# Patient Record
Sex: FEMALE | Race: BLACK OR AFRICAN AMERICAN | NOT HISPANIC OR LATINO | URBAN - METROPOLITAN AREA
[De-identification: names, ages, dates, MRNs, and addresses within clinical notes are randomized per-mention and may not be internally consistent; named-entity substitution may affect disease eponyms.]

---

## 2021-11-17 ENCOUNTER — INPATIENT (INPATIENT)
Facility: HOSPITAL | Age: 51
LOS: 6 days | Discharge: HOME HEALTH | End: 2021-11-24
Attending: INTERNAL MEDICINE | Admitting: INTERNAL MEDICINE
Payer: COMMERCIAL

## 2021-11-17 VITALS
RESPIRATION RATE: 16 BRPM | TEMPERATURE: 98 F | DIASTOLIC BLOOD PRESSURE: 77 MMHG | SYSTOLIC BLOOD PRESSURE: 121 MMHG | HEART RATE: 98 BPM | OXYGEN SATURATION: 100 %

## 2021-11-17 DIAGNOSIS — T14.8XXA OTHER INJURY OF UNSPECIFIED BODY REGION, INITIAL ENCOUNTER: ICD-10-CM

## 2021-11-17 LAB
ALBUMIN SERPL ELPH-MCNC: 4.3 G/DL — SIGNIFICANT CHANGE UP (ref 3.3–5)
ALP SERPL-CCNC: 120 U/L — SIGNIFICANT CHANGE UP (ref 40–120)
ALT FLD-CCNC: 18 U/L — SIGNIFICANT CHANGE UP (ref 4–33)
ANION GAP SERPL CALC-SCNC: 10 MMOL/L — SIGNIFICANT CHANGE UP (ref 7–14)
AST SERPL-CCNC: 16 U/L — SIGNIFICANT CHANGE UP (ref 4–32)
B PERT DNA SPEC QL NAA+PROBE: SIGNIFICANT CHANGE UP
B PERT+PARAPERT DNA PNL SPEC NAA+PROBE: SIGNIFICANT CHANGE UP
BASE EXCESS BLDV CALC-SCNC: 6.5 MMOL/L — HIGH (ref -2–3)
BASOPHILS # BLD AUTO: 0.02 K/UL — SIGNIFICANT CHANGE UP (ref 0–0.2)
BASOPHILS NFR BLD AUTO: 0.2 % — SIGNIFICANT CHANGE UP (ref 0–2)
BILIRUB SERPL-MCNC: 0.7 MG/DL — SIGNIFICANT CHANGE UP (ref 0.2–1.2)
BLOOD GAS VENOUS COMPREHENSIVE RESULT: SIGNIFICANT CHANGE UP
BORDETELLA PARAPERTUSSIS (RAPRVP): SIGNIFICANT CHANGE UP
BUN SERPL-MCNC: 12 MG/DL — SIGNIFICANT CHANGE UP (ref 7–23)
C PNEUM DNA SPEC QL NAA+PROBE: SIGNIFICANT CHANGE UP
CALCIUM SERPL-MCNC: 9.7 MG/DL — SIGNIFICANT CHANGE UP (ref 8.4–10.5)
CHLORIDE BLDV-SCNC: 97 MMOL/L — SIGNIFICANT CHANGE UP (ref 96–108)
CHLORIDE SERPL-SCNC: 97 MMOL/L — LOW (ref 98–107)
CO2 BLDV-SCNC: 34.4 MMOL/L — HIGH (ref 22–26)
CO2 SERPL-SCNC: 30 MMOL/L — SIGNIFICANT CHANGE UP (ref 22–31)
CREAT SERPL-MCNC: 0.64 MG/DL — SIGNIFICANT CHANGE UP (ref 0.5–1.3)
EOSINOPHIL # BLD AUTO: 0.08 K/UL — SIGNIFICANT CHANGE UP (ref 0–0.5)
EOSINOPHIL NFR BLD AUTO: 0.9 % — SIGNIFICANT CHANGE UP (ref 0–6)
FLUAV SUBTYP SPEC NAA+PROBE: SIGNIFICANT CHANGE UP
FLUBV RNA SPEC QL NAA+PROBE: SIGNIFICANT CHANGE UP
GAS PNL BLDV: 131 MMOL/L — LOW (ref 136–145)
GAS PNL BLDV: SIGNIFICANT CHANGE UP
GLUCOSE BLDV-MCNC: 312 MG/DL — HIGH (ref 70–99)
GLUCOSE SERPL-MCNC: 240 MG/DL — HIGH (ref 70–99)
HADV DNA SPEC QL NAA+PROBE: SIGNIFICANT CHANGE UP
HCO3 BLDV-SCNC: 33 MMOL/L — HIGH (ref 22–29)
HCOV 229E RNA SPEC QL NAA+PROBE: SIGNIFICANT CHANGE UP
HCOV HKU1 RNA SPEC QL NAA+PROBE: SIGNIFICANT CHANGE UP
HCOV NL63 RNA SPEC QL NAA+PROBE: SIGNIFICANT CHANGE UP
HCOV OC43 RNA SPEC QL NAA+PROBE: SIGNIFICANT CHANGE UP
HCT VFR BLD CALC: 40.8 % — SIGNIFICANT CHANGE UP (ref 34.5–45)
HCT VFR BLDA CALC: 40 % — SIGNIFICANT CHANGE UP (ref 34.5–46.5)
HGB BLD CALC-MCNC: 13.2 G/DL — SIGNIFICANT CHANGE UP (ref 11.5–15.5)
HGB BLD-MCNC: 13.1 G/DL — SIGNIFICANT CHANGE UP (ref 11.5–15.5)
HMPV RNA SPEC QL NAA+PROBE: SIGNIFICANT CHANGE UP
HPIV1 RNA SPEC QL NAA+PROBE: SIGNIFICANT CHANGE UP
HPIV2 RNA SPEC QL NAA+PROBE: SIGNIFICANT CHANGE UP
HPIV3 RNA SPEC QL NAA+PROBE: SIGNIFICANT CHANGE UP
HPIV4 RNA SPEC QL NAA+PROBE: SIGNIFICANT CHANGE UP
IANC: 6.84 K/UL — SIGNIFICANT CHANGE UP (ref 1.5–8.5)
IMM GRANULOCYTES NFR BLD AUTO: 0.4 % — SIGNIFICANT CHANGE UP (ref 0–1.5)
LACTATE BLDV-MCNC: 1.6 MMOL/L — SIGNIFICANT CHANGE UP (ref 0.5–2)
LYMPHOCYTES # BLD AUTO: 1.44 K/UL — SIGNIFICANT CHANGE UP (ref 1–3.3)
LYMPHOCYTES # BLD AUTO: 16.1 % — SIGNIFICANT CHANGE UP (ref 13–44)
M PNEUMO DNA SPEC QL NAA+PROBE: SIGNIFICANT CHANGE UP
MCHC RBC-ENTMCNC: 28.2 PG — SIGNIFICANT CHANGE UP (ref 27–34)
MCHC RBC-ENTMCNC: 32.1 GM/DL — SIGNIFICANT CHANGE UP (ref 32–36)
MCV RBC AUTO: 87.9 FL — SIGNIFICANT CHANGE UP (ref 80–100)
MONOCYTES # BLD AUTO: 0.52 K/UL — SIGNIFICANT CHANGE UP (ref 0–0.9)
MONOCYTES NFR BLD AUTO: 5.8 % — SIGNIFICANT CHANGE UP (ref 2–14)
NEUTROPHILS # BLD AUTO: 6.84 K/UL — SIGNIFICANT CHANGE UP (ref 1.8–7.4)
NEUTROPHILS NFR BLD AUTO: 76.6 % — SIGNIFICANT CHANGE UP (ref 43–77)
NRBC # BLD: 0 /100 WBCS — SIGNIFICANT CHANGE UP
NRBC # FLD: 0 K/UL — SIGNIFICANT CHANGE UP
PCO2 BLDV: 53 MMHG — HIGH (ref 39–42)
PH BLDV: 7.4 — SIGNIFICANT CHANGE UP (ref 7.32–7.43)
PLATELET # BLD AUTO: 314 K/UL — SIGNIFICANT CHANGE UP (ref 150–400)
PO2 BLDV: 23 MMHG — SIGNIFICANT CHANGE UP
POTASSIUM BLDV-SCNC: 3.8 MMOL/L — SIGNIFICANT CHANGE UP (ref 3.5–5.1)
POTASSIUM SERPL-MCNC: 3.9 MMOL/L — SIGNIFICANT CHANGE UP (ref 3.5–5.3)
POTASSIUM SERPL-SCNC: 3.9 MMOL/L — SIGNIFICANT CHANGE UP (ref 3.5–5.3)
PROT SERPL-MCNC: 8.3 G/DL — SIGNIFICANT CHANGE UP (ref 6–8.3)
RAPID RVP RESULT: SIGNIFICANT CHANGE UP
RBC # BLD: 4.64 M/UL — SIGNIFICANT CHANGE UP (ref 3.8–5.2)
RBC # FLD: 12.7 % — SIGNIFICANT CHANGE UP (ref 10.3–14.5)
RSV RNA SPEC QL NAA+PROBE: SIGNIFICANT CHANGE UP
RV+EV RNA SPEC QL NAA+PROBE: SIGNIFICANT CHANGE UP
SAO2 % BLDV: 37.9 % — SIGNIFICANT CHANGE UP
SARS-COV-2 RNA SPEC QL NAA+PROBE: SIGNIFICANT CHANGE UP
SODIUM SERPL-SCNC: 137 MMOL/L — SIGNIFICANT CHANGE UP (ref 135–145)
WBC # BLD: 8.94 K/UL — SIGNIFICANT CHANGE UP (ref 3.8–10.5)
WBC # FLD AUTO: 8.94 K/UL — SIGNIFICANT CHANGE UP (ref 3.8–10.5)

## 2021-11-17 PROCEDURE — 73201 CT UPPER EXTREMITY W/DYE: CPT | Mod: 26,LT,MA

## 2021-11-17 PROCEDURE — 99285 EMERGENCY DEPT VISIT HI MDM: CPT

## 2021-11-17 RX ORDER — CIPROFLOXACIN LACTATE 400MG/40ML
400 VIAL (ML) INTRAVENOUS ONCE
Refills: 0 | Status: COMPLETED | OUTPATIENT
Start: 2021-11-17 | End: 2021-11-17

## 2021-11-17 RX ORDER — ACETAMINOPHEN 500 MG
975 TABLET ORAL ONCE
Refills: 0 | Status: COMPLETED | OUTPATIENT
Start: 2021-11-17 | End: 2021-11-17

## 2021-11-17 RX ADMIN — Medication 100 MILLIGRAM(S): at 14:17

## 2021-11-17 RX ADMIN — Medication 200 MILLIGRAM(S): at 15:39

## 2021-11-17 RX ADMIN — Medication 975 MILLIGRAM(S): at 14:17

## 2021-11-17 NOTE — ED PROVIDER NOTE - OBJECTIVE STATEMENT
Val: H/o DM. Attacked 5 days ago by neighbor’s pit bull; bit and scratched by dog. Went to [facility]. Got Doxy. Got tetanus shot. Animal Control aware. No F. States she's had chills. C/o pus from wound. Increasing swelling/redness from hand to upper arm.

## 2021-11-17 NOTE — ED PROCEDURE NOTE - ATTENDING CONTRIBUTION TO CARE
Val: I was present during the critical part of the procedure.
I performed a face-to-face evaluation of the patient and performed a history and physical examination. I agree with the history and physical examination.    Val: I was present during the critical part of the procedure.

## 2021-11-17 NOTE — CONSULT NOTE ADULT - ASSESSMENT
Infected dog bite wound  drainage expressed through wound and irrigated with saline  wound packed with guaze  cx obtained  will need to press and express around wound area 2-3x/day  iv abx

## 2021-11-17 NOTE — ED PROVIDER NOTE - SHIFT CHANGE DETAILS
Dr. Lazaro: Pt signed out to me by Dr. Neal. Pt pending admission  for cellulitis s/p abx failure from dog bite

## 2021-11-17 NOTE — ED PROVIDER NOTE - PROGRESS NOTE DETAILS
Dr. Lazaro: Pt unable to tolerate ordered US to evaluate extent of cellulitis. Discussed with US team. Will dc at this time. Dr. Lazaro: Pt unable to tolerate ordered US to evaluate extent of cellulitis. Discussed with US team. Will dc test at this time. Dr. Lazaro: Case discussed with radiology regarding findings. Will call general surgery for possible debridement. Dr. Lazaro: Case discussed with general surgery, given injury below the elbow requested plastics involvement. Paged plastics. Dr. Lazaro: Case discussed with Dr. Chinchilla from plastics. No indication for debridement, recommended external washouts with milking out purulent material. Dr. Lazaro: Case rediscussed with Surgical team. No indication for surgery involvement as the fluid tracking is below the elbow. Case escalated to administration Dr. Acevedo. Dr. Lazaro: Case discussed with on call plastic surgery resident. Patient cannot physically be seen by them at this time as Dr. Chinchilla is a private attending. Dr. Lazaro: Case discussed with Dr. Patel from plastics. No indication for debridement, recommended external washouts with milking out purulent material. Dr. Lazaro: Case discussed with on call plastic surgery resident. Patient cannot physically be seen by them at this time as Dr. Patel is a private attending.

## 2021-11-17 NOTE — ED PROVIDER NOTE - CLINICAL SUMMARY MEDICAL DECISION MAKING FREE TEXT BOX
Val: Infected dog bites with pus. I & D. Admit for IV ABx (clinda and cipro). Elevate arm. CBC, xray forearm.

## 2021-11-17 NOTE — CONSULT NOTE ADULT - SUBJECTIVE AND OBJECTIVE BOX
Plastic Surgery Consult Note  ([phone])    HPI: 50yo female H/o DM. Attacked 5 days ago by neighbor’s pit bull; bit and scratched by dog. Went to [facility]. Got Doxy. Got tetanus shot. Animal Control aware. No F. States she's had chills. C/o pus from wound starting yesterday.  Plastic surgery asked to eval.      PAST MEDICAL & SURGICAL HISTORY:      Allergies    penicillins (Hives; Urticaria)    Intolerances        Home Medications:      MEDICATIONS  (STANDING):      SOCIAL HISTORY:    FAMILY HISTORY:      ___________________________________________  REVIEW OF SYSTEMS:    Constitutional: No fevers, chills, no recent weight loss  ENMT: No changes in hearing, no changes in vision, no sore throat, no cough  Respiratory: No shortness of breath  Cardiovascular: No chest pain, palpitations  Gastrointestinal: No abdominal pain, no diarrhea/constipation  Genitourinary: No dysuria, frequency, or urgency    Extremities: No joint swelling, no limited range of movement  Neurological: No paresthesia  Skin: No rashes    ___________________________________________  OBJECTIVE:  Vital Signs Last 24 Hrs  T(C): 36.9 (17 Nov 2021 18:37), Max: 36.9 (17 Nov 2021 12:15)  T(F): 98.4 (17 Nov 2021 18:37), Max: 98.4 (17 Nov 2021 12:15)  HR: 87 (17 Nov 2021 18:37) (87 - 98)  BP: 131/87 (17 Nov 2021 18:37) (117/70 - 131/87)  BP(mean): --  RR: 18 (17 Nov 2021 18:37) (16 - 18)  SpO2: 100% (17 Nov 2021 18:37) (100% - 100%)CAPILLARY BLOOD GLUCOSE        I&O's Detail      PHYSICAL EXAM:    General: Alert, NAD  Respiratory: non-labored breathing  Extremities:  erythema and fluctuance at left forearm mobile wad area with open wound draining seropurulent drainage  MSK:   ____________________________________________  LABS:  CBC Full  -  ( 17 Nov 2021 14:43 )  WBC Count : 8.94 K/uL  RBC Count : 4.64 M/uL  Hemoglobin : 13.1 g/dL  Hematocrit : 40.8 %  Platelet Count - Automated : 314 K/uL  Mean Cell Volume : 87.9 fL  Mean Cell Hemoglobin : 28.2 pg  Mean Cell Hemoglobin Concentration : 32.1 gm/dL  Auto Neutrophil # : 6.84 K/uL  Auto Lymphocyte # : 1.44 K/uL  Auto Monocyte # : 0.52 K/uL  Auto Eosinophil # : 0.08 K/uL  Auto Basophil # : 0.02 K/uL  Auto Neutrophil % : 76.6 %  Auto Lymphocyte % : 16.1 %  Auto Monocyte % : 5.8 %  Auto Eosinophil % : 0.9 %  Auto Basophil % : 0.2 %    11-17    137  |  97<L>  |  12  ----------------------------<  240<H>  3.9   |  30  |  0.64    Ca    9.7      17 Nov 2021 14:43    TPro  8.3  /  Alb  4.3  /  TBili  0.7  /  DBili  x   /  AST  16  /  ALT  18  /  AlkPhos  120  11-17    LIVER FUNCTIONS - ( 17 Nov 2021 14:43 )  Alb: 4.3 g/dL / Pro: 8.3 g/dL / ALK PHOS: 120 U/L / ALT: 18 U/L / AST: 16 U/L / GGT: x                   ____________________________________________  MICRO:    ____________________________________________  RADIOLOGY:

## 2021-11-17 NOTE — ED PROVIDER NOTE - ATTENDING CONTRIBUTION TO CARE
I performed a face-to-face evaluation of the patient and performed a history and physical examination. I agree with the history and physical examination.    Val: Infected dog bites with pus. I & D. Admit for IV ABx (clinda and cipro). Elevate arm. CBC, xray forearm.

## 2021-11-17 NOTE — ED ADULT TRIAGE NOTE - CHIEF COMPLAINT QUOTE
Pt was bit by a dog on her LT arm on Friday and was seen at a hospital in [place]. States she is worried the bite may now be infected. PMH: DM2, HTN

## 2021-11-17 NOTE — ED ADULT NURSE NOTE - OBJECTIVE STATEMENT
pt received at intake AAO x 3. Pt reports she was bit by a dog on her Left arm on Friday and was seen at a hospital in [place]. pt States she is worried the bite may now be infected.

## 2021-11-17 NOTE — ED PROCEDURE NOTE - ULTRASOUND FINDINGS
Long, thin area of hypogenicity c/w fluid, ~ 1-1.5 cm deep. Small localized fluid collection at skin surface contiguous with this long thin hypogenic stripe.

## 2021-11-17 NOTE — ED ADULT NURSE NOTE - CHIEF COMPLAINT QUOTE
Pt was bit by a dog on her LT arm on Friday and was seen at a hospital in [place]. States she is worried the bite may now be infected. PMH: DM2, HTN

## 2021-11-17 NOTE — ED PROVIDER NOTE - PHYSICAL EXAMINATION
Well appearing, well nourished, awake, alert, oriented to person, place, time/situation and in no apparent distress.    Airway patent    Eyes without scleral injection. No jaundice.    Strong pulse.    Respirations unlabored.    Abdomen soft, non-tender, no guarding.    Spine appears normal, range of motion is not limited, no muscle or joint tenderness.    Alert and oriented, no gross motor or sensory deficits.    Skin: L forearm: multiple areas that are stitched and residual scabs; dorsal aspect of forearm has a wound with pus. Erythematous, swollen. NVI.    No SI/HI.

## 2021-11-17 NOTE — ED PROCEDURE NOTE - US DIAGNOSIS
skin abscess, ? extending a long distance up and down forearm vs. the hypogenicity is reactive to overlying cellulitis

## 2021-11-18 DIAGNOSIS — Z29.9 ENCOUNTER FOR PROPHYLACTIC MEASURES, UNSPECIFIED: ICD-10-CM
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
## 2021-11-18 DIAGNOSIS — M65.9 SYNOVITIS AND TENOSYNOVITIS, UNSPECIFIED: ICD-10-CM

## 2021-11-18 DIAGNOSIS — S51.859A: ICD-10-CM

## 2021-11-18 DIAGNOSIS — I10 ESSENTIAL (PRIMARY) HYPERTENSION: ICD-10-CM

## 2021-11-18 DIAGNOSIS — E11.65 TYPE 2 DIABETES MELLITUS WITH HYPERGLYCEMIA: ICD-10-CM

## 2021-11-18 DIAGNOSIS — L03.114 CELLULITIS OF LEFT UPPER LIMB: ICD-10-CM

## 2021-11-18 LAB
ALBUMIN SERPL ELPH-MCNC: 3.6 G/DL — SIGNIFICANT CHANGE UP (ref 3.3–5)
ALP SERPL-CCNC: 103 U/L — SIGNIFICANT CHANGE UP (ref 40–120)
ALT FLD-CCNC: 13 U/L — SIGNIFICANT CHANGE UP (ref 4–33)
ANION GAP SERPL CALC-SCNC: 13 MMOL/L — SIGNIFICANT CHANGE UP (ref 7–14)
AST SERPL-CCNC: 21 U/L — SIGNIFICANT CHANGE UP (ref 4–32)
BASOPHILS # BLD AUTO: 0.02 K/UL — SIGNIFICANT CHANGE UP (ref 0–0.2)
BASOPHILS NFR BLD AUTO: 0.2 % — SIGNIFICANT CHANGE UP (ref 0–2)
BILIRUB SERPL-MCNC: 0.8 MG/DL — SIGNIFICANT CHANGE UP (ref 0.2–1.2)
BUN SERPL-MCNC: 12 MG/DL — SIGNIFICANT CHANGE UP (ref 7–23)
CALCIUM SERPL-MCNC: 9.3 MG/DL — SIGNIFICANT CHANGE UP (ref 8.4–10.5)
CHLORIDE SERPL-SCNC: 96 MMOL/L — LOW (ref 98–107)
CHOLEST SERPL-MCNC: 148 MG/DL — SIGNIFICANT CHANGE UP
CK SERPL-CCNC: 320 U/L — HIGH (ref 25–170)
CO2 SERPL-SCNC: 25 MMOL/L — SIGNIFICANT CHANGE UP (ref 22–31)
CREAT SERPL-MCNC: 0.62 MG/DL — SIGNIFICANT CHANGE UP (ref 0.5–1.3)
EOSINOPHIL # BLD AUTO: 0.1 K/UL — SIGNIFICANT CHANGE UP (ref 0–0.5)
EOSINOPHIL NFR BLD AUTO: 1.1 % — SIGNIFICANT CHANGE UP (ref 0–6)
GLUCOSE BLDC GLUCOMTR-MCNC: 180 MG/DL — HIGH (ref 70–99)
GLUCOSE BLDC GLUCOMTR-MCNC: 206 MG/DL — HIGH (ref 70–99)
GLUCOSE BLDC GLUCOMTR-MCNC: 210 MG/DL — HIGH (ref 70–99)
GLUCOSE BLDC GLUCOMTR-MCNC: 243 MG/DL — HIGH (ref 70–99)
GLUCOSE BLDC GLUCOMTR-MCNC: 256 MG/DL — HIGH (ref 70–99)
GLUCOSE SERPL-MCNC: 242 MG/DL — HIGH (ref 70–99)
HCT VFR BLD CALC: 34.3 % — LOW (ref 34.5–45)
HDLC SERPL-MCNC: 33 MG/DL — LOW
HGB BLD-MCNC: 11.1 G/DL — LOW (ref 11.5–15.5)
IANC: 6.57 K/UL — SIGNIFICANT CHANGE UP (ref 1.5–8.5)
IMM GRANULOCYTES NFR BLD AUTO: 0.5 % — SIGNIFICANT CHANGE UP (ref 0–1.5)
LIPID PNL WITH DIRECT LDL SERPL: 98 MG/DL — SIGNIFICANT CHANGE UP
LYMPHOCYTES # BLD AUTO: 1.49 K/UL — SIGNIFICANT CHANGE UP (ref 1–3.3)
LYMPHOCYTES # BLD AUTO: 16.8 % — SIGNIFICANT CHANGE UP (ref 13–44)
MAGNESIUM SERPL-MCNC: 1.9 MG/DL — SIGNIFICANT CHANGE UP (ref 1.6–2.6)
MCHC RBC-ENTMCNC: 29.1 PG — SIGNIFICANT CHANGE UP (ref 27–34)
MCHC RBC-ENTMCNC: 32.4 GM/DL — SIGNIFICANT CHANGE UP (ref 32–36)
MCV RBC AUTO: 90 FL — SIGNIFICANT CHANGE UP (ref 80–100)
MONOCYTES # BLD AUTO: 0.63 K/UL — SIGNIFICANT CHANGE UP (ref 0–0.9)
MONOCYTES NFR BLD AUTO: 7.1 % — SIGNIFICANT CHANGE UP (ref 2–14)
NEUTROPHILS # BLD AUTO: 6.57 K/UL — SIGNIFICANT CHANGE UP (ref 1.8–7.4)
NEUTROPHILS NFR BLD AUTO: 74.3 % — SIGNIFICANT CHANGE UP (ref 43–77)
NON HDL CHOLESTEROL: 115 MG/DL — SIGNIFICANT CHANGE UP
NRBC # BLD: 0 /100 WBCS — SIGNIFICANT CHANGE UP
NRBC # FLD: 0 K/UL — SIGNIFICANT CHANGE UP
PHOSPHATE SERPL-MCNC: 4 MG/DL — SIGNIFICANT CHANGE UP (ref 2.5–4.5)
PLATELET # BLD AUTO: 281 K/UL — SIGNIFICANT CHANGE UP (ref 150–400)
POTASSIUM SERPL-MCNC: 4.2 MMOL/L — SIGNIFICANT CHANGE UP (ref 3.5–5.3)
POTASSIUM SERPL-SCNC: 4.2 MMOL/L — SIGNIFICANT CHANGE UP (ref 3.5–5.3)
PROT SERPL-MCNC: 7.3 G/DL — SIGNIFICANT CHANGE UP (ref 6–8.3)
RBC # BLD: 3.81 M/UL — SIGNIFICANT CHANGE UP (ref 3.8–5.2)
RBC # FLD: 12.6 % — SIGNIFICANT CHANGE UP (ref 10.3–14.5)
SODIUM SERPL-SCNC: 134 MMOL/L — LOW (ref 135–145)
TRIGL SERPL-MCNC: 85 MG/DL — SIGNIFICANT CHANGE UP
TSH SERPL-MCNC: 1.27 UIU/ML — SIGNIFICANT CHANGE UP (ref 0.27–4.2)
WBC # BLD: 8.85 K/UL — SIGNIFICANT CHANGE UP (ref 3.8–10.5)
WBC # FLD AUTO: 8.85 K/UL — SIGNIFICANT CHANGE UP (ref 3.8–10.5)

## 2021-11-18 PROCEDURE — 99223 1ST HOSP IP/OBS HIGH 75: CPT

## 2021-11-18 RX ORDER — DEXTROSE 50 % IN WATER 50 %
12.5 SYRINGE (ML) INTRAVENOUS ONCE
Refills: 0 | Status: DISCONTINUED | OUTPATIENT
Start: 2021-11-18 | End: 2021-11-24

## 2021-11-18 RX ORDER — DEXTROSE 50 % IN WATER 50 %
25 SYRINGE (ML) INTRAVENOUS ONCE
Refills: 0 | Status: DISCONTINUED | OUTPATIENT
Start: 2021-11-18 | End: 2021-11-24

## 2021-11-18 RX ORDER — ACETAMINOPHEN 500 MG
650 TABLET ORAL EVERY 6 HOURS
Refills: 0 | Status: DISCONTINUED | OUTPATIENT
Start: 2021-11-18 | End: 2021-11-24

## 2021-11-18 RX ORDER — LANOLIN ALCOHOL/MO/W.PET/CERES
3 CREAM (GRAM) TOPICAL AT BEDTIME
Refills: 0 | Status: DISCONTINUED | OUTPATIENT
Start: 2021-11-18 | End: 2021-11-24

## 2021-11-18 RX ORDER — INSULIN LISPRO 100/ML
VIAL (ML) SUBCUTANEOUS AT BEDTIME
Refills: 0 | Status: DISCONTINUED | OUTPATIENT
Start: 2021-11-18 | End: 2021-11-24

## 2021-11-18 RX ORDER — SODIUM CHLORIDE 9 MG/ML
1000 INJECTION, SOLUTION INTRAVENOUS
Refills: 0 | Status: DISCONTINUED | OUTPATIENT
Start: 2021-11-18 | End: 2021-11-24

## 2021-11-18 RX ORDER — INSULIN LISPRO 100/ML
VIAL (ML) SUBCUTANEOUS
Refills: 0 | Status: DISCONTINUED | OUTPATIENT
Start: 2021-11-18 | End: 2021-11-20

## 2021-11-18 RX ORDER — HYDROMORPHONE HYDROCHLORIDE 2 MG/ML
0.5 INJECTION INTRAMUSCULAR; INTRAVENOUS; SUBCUTANEOUS
Refills: 0 | Status: DISCONTINUED | OUTPATIENT
Start: 2021-11-18 | End: 2021-11-24

## 2021-11-18 RX ORDER — ENOXAPARIN SODIUM 100 MG/ML
40 INJECTION SUBCUTANEOUS AT BEDTIME
Refills: 0 | Status: DISCONTINUED | OUTPATIENT
Start: 2021-11-18 | End: 2021-11-24

## 2021-11-18 RX ORDER — INSULIN LISPRO 100/ML
VIAL (ML) SUBCUTANEOUS
Refills: 0 | Status: DISCONTINUED | OUTPATIENT
Start: 2021-11-18 | End: 2021-11-18

## 2021-11-18 RX ORDER — GLUCAGON INJECTION, SOLUTION 0.5 MG/.1ML
1 INJECTION, SOLUTION SUBCUTANEOUS ONCE
Refills: 0 | Status: DISCONTINUED | OUTPATIENT
Start: 2021-11-18 | End: 2021-11-24

## 2021-11-18 RX ORDER — AMLODIPINE BESYLATE 2.5 MG/1
5 TABLET ORAL DAILY
Refills: 0 | Status: DISCONTINUED | OUTPATIENT
Start: 2021-11-18 | End: 2021-11-19

## 2021-11-18 RX ORDER — LACTOBACILLUS ACIDOPHILUS 100MM CELL
1 CAPSULE ORAL
Refills: 0 | Status: DISCONTINUED | OUTPATIENT
Start: 2021-11-18 | End: 2021-11-24

## 2021-11-18 RX ORDER — INSULIN GLARGINE 100 [IU]/ML
10 INJECTION, SOLUTION SUBCUTANEOUS AT BEDTIME
Refills: 0 | Status: DISCONTINUED | OUTPATIENT
Start: 2021-11-18 | End: 2021-11-19

## 2021-11-18 RX ORDER — VALSARTAN 80 MG/1
160 TABLET ORAL DAILY
Refills: 0 | Status: DISCONTINUED | OUTPATIENT
Start: 2021-11-18 | End: 2021-11-19

## 2021-11-18 RX ORDER — DEXTROSE 50 % IN WATER 50 %
15 SYRINGE (ML) INTRAVENOUS ONCE
Refills: 0 | Status: DISCONTINUED | OUTPATIENT
Start: 2021-11-18 | End: 2021-11-24

## 2021-11-18 RX ORDER — SODIUM CHLORIDE 9 MG/ML
1000 INJECTION INTRAMUSCULAR; INTRAVENOUS; SUBCUTANEOUS
Refills: 0 | Status: DISCONTINUED | OUTPATIENT
Start: 2021-11-18 | End: 2021-11-24

## 2021-11-18 RX ADMIN — Medication 2: at 13:36

## 2021-11-18 RX ADMIN — INSULIN GLARGINE 10 UNIT(S): 100 INJECTION, SOLUTION SUBCUTANEOUS at 23:26

## 2021-11-18 RX ADMIN — Medication 100 MILLIGRAM(S): at 14:29

## 2021-11-18 RX ADMIN — Medication 1 TABLET(S): at 13:51

## 2021-11-18 RX ADMIN — ENOXAPARIN SODIUM 40 MILLIGRAM(S): 100 INJECTION SUBCUTANEOUS at 23:24

## 2021-11-18 RX ADMIN — SODIUM CHLORIDE 100 MILLILITER(S): 9 INJECTION INTRAMUSCULAR; INTRAVENOUS; SUBCUTANEOUS at 08:10

## 2021-11-18 RX ADMIN — Medication 100 MILLIGRAM(S): at 23:17

## 2021-11-18 RX ADMIN — Medication 4: at 09:00

## 2021-11-18 RX ADMIN — Medication 1 TABLET(S): at 18:52

## 2021-11-18 RX ADMIN — Medication 4: at 16:09

## 2021-11-18 RX ADMIN — Medication 100 MILLIGRAM(S): at 04:12

## 2021-11-18 NOTE — H&P ADULT - PROBLEM SELECTOR PLAN 1
- Progressive erythema, swelling, pain of LUE despite taking PO Doxycycline. Also noted w/ yellow purulent, foul smelling drainage at dog bite wound.   - CT LUE showing "Skin defect in the mid forearm soft tissues with subjacent confluent edema concerning for cellulitis. Fluid collection within the tendon sheath at the myotendinous junction of the common extensor tendon subjacent to a skin wound at the mid forearm concerning for tenosynovitis of infectious or inflammatory etiology. Confluent edema without definitive rim enhancement in the musculature of the proximal common extensor muscle subjacent to two radiopaque foreign bodies concerning for early fluid collection versus myositis."   - s/p IV Clindamycin and Ciprofloxacin in ED, c/w _______  - Seen by Plastic Surgery appreciate recommendations   - Plastic Sx performed drainage expressed through wound with saline irrigation and packed wound with guaze  - f/u wound culture  - Press and express around wound area 2-3x/day  - Wound care c/s ordered   - Tylenol PRN - Progressive erythema, swelling, pain of LUE despite taking PO Doxycycline. Also noted w/ yellow purulent, foul smelling drainage at dog bite wound.   - CT LUE showing "Skin defect in the mid forearm soft tissues with subjacent confluent edema concerning for cellulitis. Fluid collection within the tendon sheath at the myotendinous junction of the common extensor tendon subjacent to a skin wound at the mid forearm concerning for tenosynovitis of infectious or inflammatory etiology. Confluent edema without definitive rim enhancement in the musculature of the proximal common extensor muscle subjacent to two radiopaque foreign bodies concerning for early fluid collection versus myositis."   - s/p IV Clindamycin and Ciprofloxacin in ED. c/w IV Clindamycin and IV Levofloxacin   - Seen by Plastic Surgery appreciate recommendations   - Plastic Sx performed drainage expressed through wound with saline irrigation and packed wound with guaze  - f/u wound culture  - Press and express around wound area 2-3x/day  - Wound care c/s ordered   - Tylenol PRN - Progressive erythema, swelling, pain of LUE despite taking PO Doxycycline. Also noted w/ yellow purulent, foul smelling drainage at dog bite wound.   - CT LUE showing "Skin defect in the mid forearm soft tissues with subjacent confluent edema concerning for cellulitis. Fluid collection within the tendon sheath at the myotendinous junction of the common extensor tendon subjacent to a skin wound at the mid forearm concerning for tenosynovitis of infectious or inflammatory etiology. Confluent edema without definitive rim enhancement in the musculature of the proximal common extensor muscle subjacent to two radiopaque foreign bodies concerning for early fluid collection versus myositis."   - s/p IV Clindamycin and Ciprofloxacin in ED. c/w IV Clindamycin and IV Levofloxacin   - Call ID c/s in AM   - Seen by Plastic Surgery appreciate recommendations   - Plastic Sx performed drainage expressed through wound with saline irrigation and packed wound with guaze  - f/u wound culture  - Press and express around wound area 2-3x/day  - Wound care c/s ordered   - Tylenol PRN - Progressive erythema, swelling, pain of LUE despite taking PO Doxycycline. Also noted w/ yellow purulent, foul smelling drainage at dog bite wound.   - CT LUE showing "Skin defect in the mid forearm soft tissues with subjacent confluent edema concerning for cellulitis. Fluid collection within the tendon sheath at the myotendinous junction of the common extensor tendon subjacent to a skin wound at the mid forearm concerning for tenosynovitis of infectious or inflammatory etiology. Confluent edema without definitive rim enhancement in the musculature of the proximal common extensor muscle subjacent to two radiopaque foreign bodies concerning for early fluid collection versus myositis."   - s/p IV Clindamycin and Ciprofloxacin in ED. c/w IV Clindamycin and IV Levofloxacin   - Call ID c/s in AM   - Seen by Plastic Surgery appreciate recommendations   - Plastic Sx performed drainage, saline irrigation and packed wound with guaze  - f/u wound culture  - Press and express around wound area 2-3x/day  - Wound care c/s ordered   - Tylenol PRN Severely swollen LT forearm with purulent wound drainage not responding to oral Doxycycline as outpt;  High risk for sepsis and compartment syndrome; Got tetanus shot. Animal control aware;  -Evaluated by Plastic Sx in ED: drainage expressed through wound and irrigated with saline wound packed with guaze  -Will need to press and express around wound area 2-3x/day per Plastic Sx recs   -Wound care c/s  -Abx: PCN allergy, started Levofloxacin and Clindamycin IV  - f/u wound culture Severely swollen LT forearm with purulent wound drainage not responding to oral Doxycycline as outpt;  High risk for sepsis and compartment syndrome; Got tetanus shot. Animal control aware;  -Evaluated by Plastic Sx in ED: drainage expressed through wound and irrigated with saline wound packed with guaze  -Will need to press and express around wound area 2-3x/day per Plastic Sx recs   -Wound care c/s  -Abx: PCN allergy, started Levofloxacin and Clindamycin IV  - f/u wound culture  -ordered CPK r/o rhabdo (added on)  -IV fluids Severely swollen LT forearm with purulent wound drainage not responding to oral Doxycycline as outpt;  High risk for sepsis and compartment syndrome; Got tetanus shot. Animal control aware;  -Evaluated by Plastic Sx in ED: drainage expressed through wound and irrigated with saline wound packed with guaze  -Will need to press and express around wound area 2-3x/day per Plastic Sx recs   -Wound care c/s  -Abx: PCN allergy, started Levofloxacin and Clindamycin IV  - EKG: no evidence of QT prolongation, WSc=782 (received Cipro x2 and will require Levofloxacin)   - f/u wound culture  -ordered CPK r/o rhabdo (added on)  -IV fluids

## 2021-11-18 NOTE — H&P ADULT - PROBLEM SELECTOR PLAN 4
- Continue Home BP meds w/ parameters   - monitor BP   - DASH/TLC diet - Hold Trulicity, Farxiga and Glimepiride while inpatient  - FS>200 x 3  - Start Lantus 10u qhs and Moderate ISS qa and qhs, adjust PRN   - f/u A1C  - monitor FS  - diabetic diet - Hold Trulicity, Farxiga and Glimepiride while inpatient  - FS>200 x 3  - Start Lantus 10u qhs and Moderate ISS qac, adjust PRN   - f/u A1C  - monitor FS  - diabetic diet FS>200 x 3  - Hold Trulicity, Farxiga and Glimepiride while inpatient;   - Will likely require Lantus while inpt; --> start Lantus 10u qhs and Moderate ISS qac, adjust PRN   - f/u A1C  - monitor FS  - diabetic diet

## 2021-11-18 NOTE — H&P ADULT - MUSCULOSKELETAL
details… detailed exam left wrist and elbow/no calf tenderness/decreased ROM due to pain/joint swelling/joint erythema/joint warmth

## 2021-11-18 NOTE — H&P ADULT - NEUROLOGICAL DETAILS
alert and oriented x 3/responds to verbal commands/sensation intact/deep reflexes intact/cranial nerves intact/no spontaneous movement/normal strength

## 2021-11-18 NOTE — H&P ADULT - PROBLEM SELECTOR PLAN 2
- Plan as above  - Pt received tetanus shot at OSH on 11/12.   - Unknown rabies status of dog. Animal Control services involved, pt will know for sure by 11/22. - Plan as above Erythema, swelling, worsening despite outpt PO Doxycycline.  - CT LUE showing "Skin defect in the mid forearm soft tissues with subjacent confluent edema concerning for cellulitis. Fluid collection within the tendon sheath at the myotendinous junction of the common extensor tendon subjacent to a skin wound at the mid forearm concerning for tenosynovitis of infectious or inflammatory etiology. Confluent edema without definitive rim enhancement in the musculature of the proximal common extensor muscle subjacent to two radiopaque foreign bodies concerning for early fluid collection versus myositis."   - Started IV Clindamycin and IV Levofloxacin   - Call ID c/s in AM   - Seen by Plastic Surgery appreciate recommendations   - Press and express around wound area 2-3x/day  - Wound care c/s ordered   - Tylenol PRN Erythema, swelling, worsening despite outpt PO Doxycycline.  - CT LUE showing "Skin defect in the mid forearm soft tissues with subjacent confluent edema concerning for cellulitis. Fluid collection within the tendon sheath at the myotendinous junction of the common extensor tendon subjacent to a skin wound at the mid forearm concerning for tenosynovitis of infectious or inflammatory etiology. Confluent edema without definitive rim enhancement in the musculature of the proximal common extensor muscle subjacent to two radiopaque foreign bodies concerning for early fluid collection versus myositis."   - Started IV Clindamycin and IV Levofloxacin   - Call ID c/s in AM   - Seen by Plastic Surgery appreciate recommendations   - Press and express around wound area 2-3x/day  - Wound care c/s ordered   - Pain control: Tylenol PRN, Dilaudid IVP PRN for moderate pain (RN to notify if pain is severe)

## 2021-11-18 NOTE — PROGRESS NOTE ADULT - SUBJECTIVE AND OBJECTIVE BOX
Plastic Surgery    SUBJECTIVE: Pt seen and examined on rounds with teamRei PERRY.  Complains of pain around the LUE wound site. Denies any n/v/f/c/d.     VITALS  T(C): 37 (11-18-21 @ 03:53), Max: 37 (11-18-21 @ 03:53)  HR: 85 (11-18-21 @ 05:47) (85 - 98)  BP: 110/70 (11-18-21 @ 05:47) (109/76 - 131/87)  RR: 18 (11-18-21 @ 05:47) (16 - 18)  SpO2: 100% (11-18-21 @ 05:47) (100% - 100%)  CAPILLARY BLOOD GLUCOSE      POCT Blood Glucose.: 256 mg/dL (18 Nov 2021 09:13)  POCT Blood Glucose.: 206 mg/dL (18 Nov 2021 08:06)  POCT Blood Glucose.: 243 mg/dL (18 Nov 2021 00:38)      Is/Os    PHYSICAL EXAM:    General: Alert, NAD  Respiratory: non-labored breathing  Extremities: LUE erythema and pus draining from both puncture sites. Erythema and edema extending to elbow and distally to forearm.       MEDICATIONS (STANDING): amLODIPine   Tablet 5 milliGRAM(s) Oral daily  clindamycin IVPB 900 milliGRAM(s) IV Intermittent every 8 hours  dextrose 40% Gel 15 Gram(s) Oral once  dextrose 5%. 1000 milliLiter(s) IV Continuous <Continuous>  dextrose 5%. 1000 milliLiter(s) IV Continuous <Continuous>  dextrose 50% Injectable 25 Gram(s) IV Push once  dextrose 50% Injectable 12.5 Gram(s) IV Push once  dextrose 50% Injectable 25 Gram(s) IV Push once  enoxaparin Injectable 40 milliGRAM(s) SubCutaneous at bedtime  glucagon  Injectable 1 milliGRAM(s) IntraMuscular once  insulin glargine Injectable (LANTUS) 10 Unit(s) SubCutaneous at bedtime  insulin lispro (ADMELOG) corrective regimen sliding scale   SubCutaneous three times a day before meals  insulin lispro (ADMELOG) corrective regimen sliding scale   SubCutaneous at bedtime  lactobacillus acidophilus 1 Tablet(s) Oral three times a day with meals  levoFLOXacin IVPB      sodium chloride 0.9%. 1000 milliLiter(s) IV Continuous <Continuous>  valsartan 160 milliGRAM(s) Oral daily    MEDICATIONS (PRN):acetaminophen     Tablet .. 650 milliGRAM(s) Oral every 6 hours PRN Temp greater or equal to 38C (100.4F), Mild Pain (1 - 3), Moderate Pain (4 - 6)  HYDROmorphone  Injectable 0.5 milliGRAM(s) IV Push every 3 hours PRN Moderate Pain (4 - 6)  melatonin 3 milliGRAM(s) Oral at bedtime PRN Insomnia      LABS  CBC (11-18 @ 07:10)                              11.1<L>                         8.85    )----------------(  281        74.3  % Neutrophils, 16.8  % Lymphocytes, ANC: 6.57                                34.3<L>  CBC (11-17 @ 14:43)                              13.1                           8.94    )----------------(  314        76.6  % Neutrophils, 16.1  % Lymphocytes, ANC: 6.84                                40.8      BMP (11-18 @ 07:10)             134<L>  |  96<L>   |  12    		Ca++ --      Ca 9.3                ---------------------------------( 242<H>		Mg 1.90               4.2     |  25      |  0.62  			Ph 4.0     BMP (11-17 @ 14:43)             137     |  97<L>   |  12    		Ca++ --      Ca 9.7                ---------------------------------( 240<H>		Mg --                 3.9     |  30      |  0.64  			Ph --        LFTs (11-18 @ 07:10)      TPro 7.3 / Alb 3.6 / TBili 0.8 / DBili -- / AST 21 / ALT 13 / AlkPhos 103  LFTs (11-17 @ 14:43)      TPro 8.3 / Alb 4.3 / TBili 0.7 / DBili -- / AST 16 / ALT 18 / AlkPhos 120      Cardiac Markers (11-18 @ 07:28)     HSTrop: -- / CKMB: -- / CK: 320      VBG (11-17 @ 18:58)     7.40 / 53<H> / 23 / 33<H> / 6.5<H> / 37.9%     Lactate: 1.6      IMAGING STUDIES

## 2021-11-18 NOTE — H&P ADULT - PROBLEM SELECTOR PLAN 3
- Hold Trulicity, Farxiga and Glimepiride while inpatient  - FS>200 x 3  - Start Lantus 10u qhs and Moderate ISS qa and qhs, adjust PRN   - f/u A1C  - monitor FS  - diabetic diet - c/w wound care and IV antibiotics   - Pt received tetanus shot at OSH on 11/12.   - Unknown rabies status of dog. Animal Control services involved, pt will know for sure by 11/22. - Plan as above  -Elevation of LT arm

## 2021-11-18 NOTE — CONSULT NOTE ADULT - ASSESSMENT
a/p  50yo Female w/ MHx of HTN and Type 2 DM, who was bit on her left forearm by her neighbors dog (Vonda) on 11/12, she went to OSH was given tetanus shot and discharged on PO Doxycyline and topical Bacitracin    #Sinus tachycardia  -HR improved  -likely infectious driven  -check echo to rule out LV or valvular dysfunction     #Cellulitis   -2/2 dog bite   -CT UE noted  -s/p I&D  -f/u wound culture  -iv abx per primary team   -ID eval  -plastics / med f/u    #HTN  -sbp stable - however pt did not receive bp meds today  -can hold norvasc   -cont ARB with hold parameters   -cont to monitor     dvt ppx

## 2021-11-18 NOTE — H&P ADULT - ASSESSMENT
This is a 52yo Female w/ PMHx of HTN and T2DM, who was bit on her left arm by her neighbors dog (Vonda) on 11/12, she went to OSH was given tetanus shot and discharged on PO Doxycyline and topical Bacitracin. Despite taking antibiotics as directed, the pt noted that her left arm was becoming increasingly swollen, warm, red and the wound started having yellow "pus" drainage that had a found smell. Admitted to medicine for IV antibiotics.  52yo Female w/ PMHx of HTN and T2DM, who was bit on her left arm by her neighbors dog (Vonda) a/w purulent cellulitis of Rt arm with associated tenosynovitis of Rt arm in the setting of infected dog bite wound;

## 2021-11-18 NOTE — PROGRESS NOTE ADULT - ASSESSMENT
52yo Female w/ MHx of HTN and Type 2 DM, who was bit on her left forearm by her neighbors dog (Vonda) on 11/12, she went to OSH was given tetanus shot and discharged on PO Doxycyline and topical Bacitracin    #Cellulitis   -2/2 dog bite   -CT UE noted  -s/p I&D  -f/u wound culture  -iv abx per primary team   -ID eval  -plastics following     #Sinus tachycardia  -HR improved  -likely infectious driven  -check echo to rule out LV or valvular dysfunction     #HTN  -sbp stable - however pt did not receive bp meds today  -can hold norvasc   -cont ARB with hold parameters   -cont to monitor     dvt ppx     radha pitts MD

## 2021-11-18 NOTE — PROGRESS NOTE ADULT - ASSESSMENT
51 y.o F w/ a hx of DM that was admitted for worsening cellulitis and infection secondary to a dog bite to the [facility]    Plan:  - Continue drainage with gauze and pressing/expressing about 2-3x a day  - pack wound with moistened gauze and wrap with kerlix.   - f/u cultures  - IV abx  - care per primary team  - planning on bedside I&D for source control.     [id number] PRS

## 2021-11-18 NOTE — PROGRESS NOTE ADULT - SUBJECTIVE AND OBJECTIVE BOX
Patient is a 51y old  Female who presents with a chief complaint of Arm swelling due to dog bite (18 Nov 2021 15:00)      INTERVAL HPI/OVERNIGHT EVENTS:  T(C): 36.7 (11-18-21 @ 21:51), Max: 37.2 (11-18-21 @ 14:15)  HR: 92 (11-18-21 @ 21:51) (85 - 99)  BP: 122/78 (11-18-21 @ 21:51) (109/76 - 122/78)  RR: 18 (11-18-21 @ 21:51) (16 - 18)  SpO2: 100% (11-18-21 @ 21:51) (100% - 100%)  Wt(kg): --  I&O's Summary      PAST MEDICAL & SURGICAL HISTORY:  HTN (hypertension)    Type 2 diabetes mellitus    No significant past surgical history        SOCIAL HISTORY  Alcohol:  Tobacco:  Illicit substance use:    FAMILY HISTORY:    REVIEW OF SYSTEMS:  CONSTITUTIONAL: No fever, weight loss, or fatigue  EYES: No eye pain, visual disturbances, or discharge  ENMT:  No difficulty hearing, tinnitus, vertigo; No sinus or throat pain  NECK: No pain or stiffness  RESPIRATORY: No cough, wheezing, chills or hemoptysis; No shortness of breath  CARDIOVASCULAR: No chest pain, palpitations, dizziness, or leg swelling  GASTROINTESTINAL: No abdominal or epigastric pain. No nausea, vomiting, or hematemesis; No diarrhea or constipation. No melena or hematochezia.  GENITOURINARY: No dysuria, frequency, hematuria, or incontinence  NEUROLOGICAL: No headaches, memory loss, loss of strength, numbness, or tremors  SKIN: No itching, burning, rashes, or lesions   LYMPH NODES: No enlarged glands  ENDOCRINE: No heat or cold intolerance; No hair loss  MUSCULOSKELETAL: No joint pain or swelling; No muscle, back, or extremity pain  PSYCHIATRIC: No depression, anxiety, mood swings, or difficulty sleeping  HEME/LYMPH: No easy bruising, or bleeding gums  ALLERY AND IMMUNOLOGIC: No hives or eczema    RADIOLOGY & ADDITIONAL TESTS:    Imaging Personally Reviewed:  [ ] YES  [ ] NO    Consultant(s) Notes Reviewed:  [ ] YES  [ ] NO    PHYSICAL EXAM:  GENERAL: NAD, well-groomed, well-developed  HEAD:  Atraumatic, Normocephalic  EYES: EOMI, PERRLA, conjunctiva and sclera clear  ENMT: No tonsillar erythema, exudates, or enlargement; Moist mucous membranes, Good dentition, No lesions  NECK: Supple, No JVD, Normal thyroid  NERVOUS SYSTEM:  Alert & Oriented X3, Good concentration; Motor Strength 5/5 B/L upper and lower extremities; DTRs 2+ intact and symmetric  CHEST/LUNG: Clear to percussion bilaterally; No rales, rhonchi, wheezing, or rubs  HEART: Regular rate and rhythm; No murmurs, rubs, or gallops  ABDOMEN: Soft, Nontender, Nondistended; Bowel sounds present  EXTREMITIES:  2+ Peripheral Pulses, No clubbing, cyanosis, or edema  LYMPH: No lymphadenopathy noted  SKIN: No rashes or lesions    LABS:                        11.1   8.85  )-----------( 281      ( 18 Nov 2021 07:10 )             34.3     11-18    134<L>  |  96<L>  |  12  ----------------------------<  242<H>  4.2   |  25  |  0.62    Ca    9.3      18 Nov 2021 07:10  Phos  4.0     11-18  Mg     1.90     11-18    TPro  7.3  /  Alb  3.6  /  TBili  0.8  /  DBili  x   /  AST  21  /  ALT  13  /  AlkPhos  103  11-18        CAPILLARY BLOOD GLUCOSE      POCT Blood Glucose.: 173 mg/dL (18 Nov 2021 22:38)  POCT Blood Glucose.: 210 mg/dL (18 Nov 2021 15:57)  POCT Blood Glucose.: 180 mg/dL (18 Nov 2021 13:26)  POCT Blood Glucose.: 256 mg/dL (18 Nov 2021 09:13)  POCT Blood Glucose.: 206 mg/dL (18 Nov 2021 08:06)  POCT Blood Glucose.: 243 mg/dL (18 Nov 2021 00:38)            MEDICATIONS  (STANDING):  amLODIPine   Tablet 5 milliGRAM(s) Oral daily  clindamycin IVPB 900 milliGRAM(s) IV Intermittent every 8 hours  dextrose 40% Gel 15 Gram(s) Oral once  dextrose 5%. 1000 milliLiter(s) (50 mL/Hr) IV Continuous <Continuous>  dextrose 5%. 1000 milliLiter(s) (100 mL/Hr) IV Continuous <Continuous>  dextrose 50% Injectable 25 Gram(s) IV Push once  dextrose 50% Injectable 12.5 Gram(s) IV Push once  dextrose 50% Injectable 25 Gram(s) IV Push once  enoxaparin Injectable 40 milliGRAM(s) SubCutaneous at bedtime  glucagon  Injectable 1 milliGRAM(s) IntraMuscular once  insulin glargine Injectable (LANTUS) 10 Unit(s) SubCutaneous at bedtime  insulin lispro (ADMELOG) corrective regimen sliding scale   SubCutaneous three times a day before meals  insulin lispro (ADMELOG) corrective regimen sliding scale   SubCutaneous at bedtime  lactobacillus acidophilus 1 Tablet(s) Oral three times a day with meals  levoFLOXacin IVPB      sodium chloride 0.9%. 1000 milliLiter(s) (100 mL/Hr) IV Continuous <Continuous>  valsartan 160 milliGRAM(s) Oral daily    MEDICATIONS  (PRN):  acetaminophen     Tablet .. 650 milliGRAM(s) Oral every 6 hours PRN Temp greater or equal to 38C (100.4F), Mild Pain (1 - 3), Moderate Pain (4 - 6)  HYDROmorphone  Injectable 0.5 milliGRAM(s) IV Push every 3 hours PRN Moderate Pain (4 - 6)  melatonin 3 milliGRAM(s) Oral at bedtime PRN Insomnia      Care Discussed with Consultants/Other Providers [ ] YES  [ ] NO Patient is a 51y old  Female who presents with a chief complaint of Arm swelling due to dog bite (18 Nov 2021 15:00)      INTERVAL HPI/OVERNIGHT EVENTS: seen and examined, c/o arm pain   T(C): 36.7 (11-18-21 @ 21:51), Max: 37.2 (11-18-21 @ 14:15)  HR: 92 (11-18-21 @ 21:51) (85 - 99)  BP: 122/78 (11-18-21 @ 21:51) (109/76 - 122/78)  RR: 18 (11-18-21 @ 21:51) (16 - 18)  SpO2: 100% (11-18-21 @ 21:51) (100% - 100%)  Wt(kg): --  I&O's Summary      PAST MEDICAL & SURGICAL HISTORY:  HTN (hypertension)    Type 2 diabetes mellitus    No significant past surgical history        SOCIAL HISTORY  Alcohol:  Tobacco:  Illicit substance use:    FAMILY HISTORY:    REVIEW OF SYSTEMS:  CONSTITUTIONAL: No fever, weight loss, or fatigue  EYES: No eye pain, visual disturbances, or discharge  ENMT:  No difficulty hearing, tinnitus, vertigo; No sinus or throat pain  NECK: No pain or stiffness  RESPIRATORY: No cough, wheezing, chills or hemoptysis; No shortness of breath  CARDIOVASCULAR: No chest pain, palpitations, dizziness, or leg swelling  GASTROINTESTINAL: No abdominal or epigastric pain. No nausea, vomiting, or hematemesis; No diarrhea or constipation. No melena or hematochezia.  GENITOURINARY: No dysuria, frequency, hematuria, or incontinence  NEUROLOGICAL: No headaches, memory loss, loss of strength, numbness, or tremors  SKIN: No itching, burning, rashes, or lesions   LYMPH NODES: No enlarged glands  ENDOCRINE: No heat or cold intolerance; No hair loss  MUSCULOSKELETAL: No joint pain or swelling; No muscle, back, or extremity pain  PSYCHIATRIC: No depression, anxiety, mood swings, or difficulty sleeping  HEME/LYMPH: No easy bruising, or bleeding gums  ALLERY AND IMMUNOLOGIC: No hives or eczema    RADIOLOGY & ADDITIONAL TESTS:    Imaging Personally Reviewed:  [ ] YES  [ ] NO    Consultant(s) Notes Reviewed:  [ ] YES  [ ] NO    PHYSICAL EXAM:  GENERAL: NAD, well-groomed, well-developed  HEAD:  Atraumatic, Normocephalic  EYES: EOMI, PERRLA, conjunctiva and sclera clear  ENMT: No tonsillar erythema, exudates, or enlargement; Moist mucous membranes, Good dentition, No lesions  NECK: Supple, No JVD, Normal thyroid  NERVOUS SYSTEM:  Alert & Oriented X3, Good concentration; Motor Strength 5/5 B/L upper and lower extremities; DTRs 2+ intact and symmetric  CHEST/LUNG: Clear to percussion bilaterally; No rales, rhonchi, wheezing, or rubs  HEART: Regular rate and rhythm; No murmurs, rubs, or gallops  ABDOMEN: Soft, Nontender, Nondistended; Bowel sounds present  EXTREMITIES:  2+ Peripheral Pulses, No clubbing, cyanosis, or edema  LYMPH: No lymphadenopathy noted  SKIN: No rashes or lesions    LABS:                        11.1   8.85  )-----------( 281      ( 18 Nov 2021 07:10 )             34.3     11-18    134<L>  |  96<L>  |  12  ----------------------------<  242<H>  4.2   |  25  |  0.62    Ca    9.3      18 Nov 2021 07:10  Phos  4.0     11-18  Mg     1.90     11-18    TPro  7.3  /  Alb  3.6  /  TBili  0.8  /  DBili  x   /  AST  21  /  ALT  13  /  AlkPhos  103  11-18        CAPILLARY BLOOD GLUCOSE      POCT Blood Glucose.: 173 mg/dL (18 Nov 2021 22:38)  POCT Blood Glucose.: 210 mg/dL (18 Nov 2021 15:57)  POCT Blood Glucose.: 180 mg/dL (18 Nov 2021 13:26)  POCT Blood Glucose.: 256 mg/dL (18 Nov 2021 09:13)  POCT Blood Glucose.: 206 mg/dL (18 Nov 2021 08:06)  POCT Blood Glucose.: 243 mg/dL (18 Nov 2021 00:38)            MEDICATIONS  (STANDING):  amLODIPine   Tablet 5 milliGRAM(s) Oral daily  clindamycin IVPB 900 milliGRAM(s) IV Intermittent every 8 hours  dextrose 40% Gel 15 Gram(s) Oral once  dextrose 5%. 1000 milliLiter(s) (50 mL/Hr) IV Continuous <Continuous>  dextrose 5%. 1000 milliLiter(s) (100 mL/Hr) IV Continuous <Continuous>  dextrose 50% Injectable 25 Gram(s) IV Push once  dextrose 50% Injectable 12.5 Gram(s) IV Push once  dextrose 50% Injectable 25 Gram(s) IV Push once  enoxaparin Injectable 40 milliGRAM(s) SubCutaneous at bedtime  glucagon  Injectable 1 milliGRAM(s) IntraMuscular once  insulin glargine Injectable (LANTUS) 10 Unit(s) SubCutaneous at bedtime  insulin lispro (ADMELOG) corrective regimen sliding scale   SubCutaneous three times a day before meals  insulin lispro (ADMELOG) corrective regimen sliding scale   SubCutaneous at bedtime  lactobacillus acidophilus 1 Tablet(s) Oral three times a day with meals  levoFLOXacin IVPB      sodium chloride 0.9%. 1000 milliLiter(s) (100 mL/Hr) IV Continuous <Continuous>  valsartan 160 milliGRAM(s) Oral daily    MEDICATIONS  (PRN):  acetaminophen     Tablet .. 650 milliGRAM(s) Oral every 6 hours PRN Temp greater or equal to 38C (100.4F), Mild Pain (1 - 3), Moderate Pain (4 - 6)  HYDROmorphone  Injectable 0.5 milliGRAM(s) IV Push every 3 hours PRN Moderate Pain (4 - 6)  melatonin 3 milliGRAM(s) Oral at bedtime PRN Insomnia      Care Discussed with Consultants/Other Providers [ ] YES  [ ] NO

## 2021-11-18 NOTE — H&P ADULT - GASTROINTESTINAL DETAILS
soft/nontender/no distention/no masses palpable/bowel sounds normal/no rebound tenderness/no guarding/no rigidity
no

## 2021-11-18 NOTE — H&P ADULT - NEGATIVE NEUROLOGICAL SYMPTOMS
no transient paralysis/no weakness/no syncope/no vertigo/no loss of sensation/no difficulty walking/no loss of consciousness/no confusion

## 2021-11-18 NOTE — H&P ADULT - NSHPSOCIALHISTORY_GEN_ALL_CORE
Pt works in [employer]   No tobacco, alcohol or illicit drug use. Pt works in [employer]   Lives with her son  No tobacco, alcohol or illicit drug use

## 2021-11-18 NOTE — H&P ADULT - SKIN
warm and dry/erythema/swelling detailed exam pitting edema to entire forearm with wrist and hand/warm and dry/erythema/swelling

## 2021-11-18 NOTE — CONSULT NOTE ADULT - SUBJECTIVE AND OBJECTIVE BOX
CARDIOLOGY CONSULT - Dr. David         HPI:  52yo Female w/ MHx of HTN and Type 2 DM, who was bit on her left forearm by her neighbors dog (Vonda) on 11/12, she went to OSH was given tetanus shot and discharged on PO Doxycyline and topical Bacitracin. Despite taking antibiotics as directed, the pt noted that her left arm was becoming increasingly swollen, warm and red. She was having decreased ROM due to swelling and pain, and the wound started draining yellow "pus" drainage that had a foul smell. She was concerned for worsening infection and came to [facility] for further evaluation. Pt denies any fevers, endorses intermittent chills. Only other complaint at this time is HA which she attributes to not eating all day. Pt denies chest pain, SOB, SANCHEZ, sick contacts, abdominal pain, N/V/D/C, dysuria, hematuria.     ED Course: Pt has remained afebrile. s/p IV Ciprofloxacin, IV Clindamycin and Tylenol. Seen by plastic surgery team, s/p drainage, saline irrigation and gauze packing.  (18 Nov 2021 02:50)    Cardiac eval for sinus tach -   She denies any chest pain, dyspnea, palpitations, cough, syncope, edema, exertional symptoms, nausea, abdominal pain, fever, chills,  or rash.  Reports a nl stress and echo about a year ago.  Denies any history of CAD, MI, valve disease, cardiomyopathy, or congenital heart disease.     PAST MEDICAL & SURGICAL HISTORY:  HTN (hypertension)    Type 2 diabetes mellitus    No significant past surgical history            PREVIOUS DIAGNOSTIC TESTING:    [ ] Echocardiogram:   [ ]  Catheterization:  [ ] Stress Test:  	    MEDICATIONS:  Home Medications:  amLODIPine 5 mg oral tablet: 1 tab(s) orally once a day (18 Nov 2021 01:39)  bacitracin 500 units/g topical ointment: Apply topically to affected area 2 times a day (18 Nov 2021 01:39)  Farxiga 5 mg oral tablet: 1 tab(s) orally once a day (18 Nov 2021 01:39)  glimepiride 2 mg oral tablet: 1 tab(s) orally once a day (18 Nov 2021 01:39)  Trulicity Pen 1.5 mg/0.5 mL subcutaneous solution: 1 dose(s) subcutaneous once a week on saturday (18 Nov 2021 01:39)  Tylenol 500 mg oral tablet: 1 tab(s) orally every 6 hours, As Needed (18 Nov 2021 01:39)  valsartan 160 mg oral capsule: 1 cap(s) orally once a day (18 Nov 2021 01:39)      MEDICATIONS  (STANDING):  amLODIPine   Tablet 5 milliGRAM(s) Oral daily  clindamycin IVPB 900 milliGRAM(s) IV Intermittent every 8 hours  dextrose 40% Gel 15 Gram(s) Oral once  dextrose 5%. 1000 milliLiter(s) (50 mL/Hr) IV Continuous <Continuous>  dextrose 5%. 1000 milliLiter(s) (100 mL/Hr) IV Continuous <Continuous>  dextrose 50% Injectable 25 Gram(s) IV Push once  dextrose 50% Injectable 12.5 Gram(s) IV Push once  dextrose 50% Injectable 25 Gram(s) IV Push once  enoxaparin Injectable 40 milliGRAM(s) SubCutaneous at bedtime  glucagon  Injectable 1 milliGRAM(s) IntraMuscular once  insulin glargine Injectable (LANTUS) 10 Unit(s) SubCutaneous at bedtime  insulin lispro (ADMELOG) corrective regimen sliding scale   SubCutaneous three times a day before meals  insulin lispro (ADMELOG) corrective regimen sliding scale   SubCutaneous at bedtime  lactobacillus acidophilus 1 Tablet(s) Oral three times a day with meals  levoFLOXacin IVPB      sodium chloride 0.9%. 1000 milliLiter(s) (100 mL/Hr) IV Continuous <Continuous>  valsartan 160 milliGRAM(s) Oral daily      FAMILY HISTORY:  FH: type 2 diabetes        SOCIAL HISTORY:    [ ] Non-smoker  [ ] Smoker  [ ] Alcohol    Allergies    penicillins (Hives; Urticaria)    Intolerances    	    REVIEW OF SYSTEMS:  CONSTITUTIONAL: No fever, weight loss, or fatigue  EYES: No eye pain, visual disturbances, or discharge  ENMT:  No difficulty hearing, tinnitus, vertigo; No sinus or throat pain  NECK: No pain or stiffness  RESPIRATORY: No cough, wheezing, chills or hemoptysis; No Shortness of Breath  CARDIOVASCULAR: No chest pain, palpitations, passing out, dizziness, or leg swelling  GASTROINTESTINAL: No abdominal or epigastric pain. No nausea, vomiting, or hematemesis; No diarrhea or constipation. No melena or hematochezia.  GENITOURINARY: No dysuria, frequency, hematuria, or incontinence  NEUROLOGICAL: No headaches, memory loss, loss of strength, numbness, or tremors  SKIN: No itching, burning, rashes, or lesions   	    [ x] All others negative	see hpi   [ ] Unable to obtain    PHYSICAL EXAM:  T(C): 37.2 (11-18-21 @ 14:15), Max: 37.2 (11-18-21 @ 14:15)  HR: 94 (11-18-21 @ 14:15) (85 - 94)  BP: 120/73 (11-18-21 @ 14:15) (109/76 - 131/87)  RR: 18 (11-18-21 @ 14:15) (17 - 18)  SpO2: 100% (11-18-21 @ 14:15) (100% - 100%)  Wt(kg): --  I&O's Summary      Appearance: Normal	  Psychiatry: A & O x 3, Mood & affect appropriate  HEENT:   Normal oral mucosa, PERRL, EOMI	  Lymphatic: No lymphadenopathy  Cardiovascular: Normal S1 S2,RRR, No JVD, No murmurs  Respiratory: Lungs clear to auscultation	  Gastrointestinal:  Soft, Non-tender, + BS	  Skin: No rashes, No ecchymoses, No cyanosis	  Neurologic: Non-focal  Extremities: Normal range of motion, No clubbing, cyanosis or edema  Vascular: Peripheral pulses palpable 2+ bilaterally    TELEMETRY: 	    ECG:  	NSr 90 - no acute ischemic changes   RADIOLOGY:  < from: CT Upper Extremity w/ IV Cont, Left (11.17.21 @ 18:01) >    FINDINGS:    OSSEOUS STRUCTURES: No fracture is appreciated. No cortical erosion or destruction is seen. There is no periosteal reaction present.  SYNOVIUM/ JOINT FLUID: No joint effusion is noted.  TENDONS: There is fluid distending the extensor tendon sheath at the myotendinous portion of the forearm at the level of an overlying skin wound. This fluid collection measures approximately 0.5 x 0.7 x 2 cm. The remaining tendons appear intact. No full-thickness tendon tear or retraction is noted.  MUSCLES: There is mild hypodensity within the common extensor musculature and irregularity of the musculature at the level of the skin wound concerning for muscle edema. An additional area of confluent edema/early fluid collection is seen at the level of the proximal extensor musculature approximately 5 cm from the lateral epicondyle subjacent to two subcentimeter foreign bodies detailed below. This area measures approximately 1.2 x 1.1 x 2.7 cm  NEUROVASCULAR STRUCTURES: Preserved  SUBCUTANEOUS SOFT TISSUES: There is a skin wound overlying the mid forearm which measures approximately 1 x 0.5 cm. Deep to the skin wound, there is a mixture ofconfluent edema and several foci of soft tissue gas. This tracks deep to contact the underlying musculature where small fluid collection is appreciated within the myotendinous portion of the tendon of the mid forearm, and extensor muscle. No rim-enhancing fluid collection is appreciated within the subcutaneous tissues. Two radiopaque densities are appreciated within the proximal third forearm proximal to the skin wound x 6 cm (series 5, image 385 and 377) concerning for small foreign bodies.    3-D reformatted imaging confirms these findings.    IMPRESSION:    1.  Skin defect in the mid forearm soft tissues with subjacent confluent edema concerning for cellulitis. No rim-enhancing fluid collection within the subcutaneous soft tissues 2. Radiopaque subcentimeter flecks in the subcutaneous tissues at the proximal forearm approximately 5 cm from the lateral epicondyle overlying the common extensor musculature concerning for small foreign bodies.  2.  Fluid collection within the tendon sheathat the myotendinous junction of the common extensor tendon subjacent to a skin wound at the mid forearm concerning for tenosynovitis of infectious or inflammatory etiology  3.  Confluent edema without definitive rim enhancement in the musculature of the proximal common extensor muscle subjacent to two radiopaque foreign bodies concerning for early fluid collection versus myositis.    Findings were discussed with Dr. Rosenberg by Dr.De Velasco on 11/17/2021 7:00 PM  with read back.      < end of copied text >    OTHER: 	  	  LABS:	 	    CARDIAC MARKERS:                                  11.1   8.85  )-----------( 281      ( 18 Nov 2021 07:10 )             34.3     11-18    134<L>  |  96<L>  |  12  ----------------------------<  242<H>  4.2   |  25  |  0.62    Ca    9.3      18 Nov 2021 07:10  Phos  4.0     11-18  Mg     1.90     11-18    TPro  7.3  /  Alb  3.6  /  TBili  0.8  /  DBili  x   /  AST  21  /  ALT  13  /  AlkPhos  103  11-18      proBNP:   Lipid Profile:   HgA1c:   TSH: Thyroid Stimulating Hormone, Serum: 1.27 uIU/mL (11-18 @ 07:10)

## 2021-11-18 NOTE — H&P ADULT - ATTENDING COMMENTS
50yo Female w/ PMHx of HTN and T2DM, who was bit on her left arm by her neighbors dog (Vonda) a/w purulent cellulitis of Rt arm with associated tenosynovitis of Rt arm in the setting of infected dog bite wound; 52yo Female w/ MHx of HTN and Type 2 DM, who was bit on her left arm by her neighbors dog (Vonda) a/w purulent cellulitis of Rt arm with associated tenosynovitis of Rt arm in the setting of infected dog bite wound; 52yo Female w/ MHx of HTN and Type 2 DM, who was bit on her left arm by her neighbors dog (Vonda) a/w purulent cellulitis of Rt arm with associated tenosynovitis of Rt arm in the setting of infected dog bite wound;    The above assessment and plan were supplemented and modified by attending where indicated;

## 2021-11-18 NOTE — H&P ADULT - HISTORY OF PRESENT ILLNESS
This is a 50yo Female w/ PMHx of HTN and T2DM, who was bit on her left arm by her neighbors dog (Vonda) on 11/12, she went to OSH was given tetanus shot and discharged on PO Doxycyline and topical Bacitracin. Despite taking antibiotics as directed, the pt noted that her left arm was becoming increasingly swollen, warm and red. She was having decreased ROM due to swelling and pain, and the wound started having yellow "pus" drainage that had a found smell. She was concerned for worsening infection and came to [facility] for further evaluation. Pt denies any fevers, endorses intermittent chills. Only other complaint at this time is HA which she attributes to not eating all day. Pt denies chest pain, SOB, SANCHEZ, sick contacts, abdominal pain, N/V/D/C, dysuria, hematuria.     ED Course: Pt has remained afebrile. CT Left extremity showing "Skin defect in the mid forearm soft tissues with subjacent confluent edema concerning for cellulitis. Fluid collection within the tendon sheath at the myotendinous junction of the common extensor tendon subjacent to a skin wound at the mid forearm concerning for tenosynovitis of infectious or inflammatory etiology. Confluent edema without definitive rim enhancement in the musculature of the proximal common extensor muscle subjacent to two radiopaque foreign bodies concerning for early fluid collection versus myositis." s/p IV Ciprofloxacin, IV Clindamycin and Tylenol. Seen by plastic surgery team, s/p drainage, saline irrigation and gauze packing.  This is a 50yo Female w/ PMHx of HTN and T2DM, who was bit on her left arm by her neighbors dog (Vonda) on 11/12, she went to OSH was given tetanus shot and discharged on PO Doxycyline and topical Bacitracin. Despite taking antibiotics as directed, the pt noted that her left arm was becoming increasingly swollen, warm and red. She was having decreased ROM due to swelling and pain, and the wound started having yellow "pus" drainage that had a foul smell. She was concerned for worsening infection and came to [facility] for further evaluation. Pt denies any fevers, endorses intermittent chills. Only other complaint at this time is HA which she attributes to not eating all day. Pt denies chest pain, SOB, SANCHEZ, sick contacts, abdominal pain, N/V/D/C, dysuria, hematuria.     ED Course: Pt has remained afebrile. CT Left extremity showing "Skin defect in the mid forearm soft tissues with subjacent confluent edema concerning for cellulitis. Fluid collection within the tendon sheath at the myotendinous junction of the common extensor tendon subjacent to a skin wound at the mid forearm concerning for tenosynovitis of infectious or inflammatory etiology. Confluent edema without definitive rim enhancement in the musculature of the proximal common extensor muscle subjacent to two radiopaque foreign bodies concerning for early fluid collection versus myositis." s/p IV Ciprofloxacin, IV Clindamycin and Tylenol. Seen by plastic surgery team, s/p drainage, saline irrigation and gauze packing.  52yo Female w/ MHx of HTN and Type 2 DM, who was bit on her left forearm by her neighbors dog (Vonda) on 11/12, she went to OSH was given tetanus shot and discharged on PO Doxycyline and topical Bacitracin. Despite taking antibiotics as directed, the pt noted that her left arm was becoming increasingly swollen, warm and red. She was having decreased ROM due to swelling and pain, and the wound started draining yellow "pus" drainage that had a foul smell. She was concerned for worsening infection and came to [facility] for further evaluation. Pt denies any fevers, endorses intermittent chills. Only other complaint at this time is HA which she attributes to not eating all day. Pt denies chest pain, SOB, SANCHEZ, sick contacts, abdominal pain, N/V/D/C, dysuria, hematuria.     ED Course: Pt has remained afebrile. s/p IV Ciprofloxacin, IV Clindamycin and Tylenol. Seen by plastic surgery team, s/p drainage, saline irrigation and gauze packing.

## 2021-11-18 NOTE — H&P ADULT - NSHPPHYSICALEXAM_GEN_ALL_CORE
Vital Signs Last 24 Hrs  T(C): 37 (18 Nov 2021 03:53), Max: 37 (18 Nov 2021 03:53)  T(F): 98.6 (18 Nov 2021 03:53), Max: 98.6 (18 Nov 2021 03:53)  HR: 93 (18 Nov 2021 03:53) (87 - 98)  BP: 109/76 (18 Nov 2021 03:53) (109/76 - 131/87)  BP(mean): --  RR: 18 (18 Nov 2021 03:53) (16 - 18)  SpO2: 100% (18 Nov 2021 03:53) (100% - 100%)

## 2021-11-18 NOTE — H&P ADULT - NSHPLABSRESULTS_GEN_ALL_CORE
Labs:                        13.1   8.94  )-----------( 314      ( 17 Nov 2021 14:43 )             40.8     11-17    137  |  97<L>  |  12  ----------------------------<  240<H>  3.9   |  30  |  0.64    Ca    9.7      17 Nov 2021 14:43    TPro  8.3  /  Alb  4.3  /  TBili  0.7  /  DBili  x   /  AST  16  /  ALT  18  /  AlkPhos  120  11-17      Blood Gas Venous:  pH: 7.40 | HCO3: 33 | pCO2: 53 | pO2: 23 | Lactate: 1.6 (11-17-21 @ 18:58)      CAPILLARY BLOOD GLUCOSE:  POCT Blood Glucose: 243 mg/dL (11-18-21 @ 00:38)    COVID-19/Full RVP Panel:  11-17-21 @ 20:00  Adenovirus: NotDetec  CHlamydia pneumoniae: NotDetec  Coronavirus (229E, KHU1, NL63, OC43): --  Entero/Rhinovirus: NotDetec  hMPV: NotDetec  Influenza A: NotDetec  Influenza AH1: --  Influenza AH1 2009: --  Influenza AH3: --  Influenza B: NotDetec  Mycoplasma pneumoniae: NotDetec  Parainfluenza 1: NotDetec  Parainfluenza 2: NotDetec  Parainfluenza 3: NotDetec  Parainfluenza 4: NotDetec  Rapid RVP Results: NotDetec  Resp Syncytial Virus: NotDetec  SARS-CoV-2: NotDetec    < from: CT Upper Extremity w/ IV Cont, Left (11.17.21 @ 18:01) >  IMPRESSION:  1.  Skin defect in the mid forearm soft tissues with subjacent confluent edema concerning for cellulitis. No rim-enhancing fluid collection within the subcutaneous soft tissues 2. Radiopaque subcentimeter flecks in the subcutaneous tissues at the proximal forearm approximately 5 cm from the lateral epicondyle overlying the common extensor musculature concerning for small foreign bodies.  2.  Fluid collection within the tendon sheath at the myotendinous junction of the common extensor tendon subjacent to a skin wound at the mid forearm concerning for tenosynovitis of infectious or inflammatory etiology  3.  Confluent edema without definitive rim enhancement in the musculature of the proximal common extensor muscle subjacent to two radiopaque foreign bodies concerning for early fluid collection versus myositis.  < end of copied text > --Personally reviewed the labs below:                        13.1   8.94  )-----------( 314      ( 17 Nov 2021 14:43 )             40.8     11-17    137  |  97<L>  |  12  ----------------------------<  240<H>  3.9   |  30  |  0.64    Ca    9.7      17 Nov 2021 14:43    TPro  8.3  /  Alb  4.3  /  TBili  0.7  /  DBili  x   /  AST  16  /  ALT  18  /  AlkPhos  120  11-17      Blood Gas Venous:  pH: 7.40 | HCO3: 33 | pCO2: 53 | pO2: 23 | Lactate: 1.6 (11-17-21 @ 18:58)      CAPILLARY BLOOD GLUCOSE:  POCT Blood Glucose: 243 mg/dL (11-18-21 @ 00:38)    COVID-19/Full RVP Panel:  11-17-21 @ 20:00  Adenovirus: NotDetec  CHlamydia pneumoniae: NotDetec  Coronavirus (229E, KHU1, NL63, OC43): --  Entero/Rhinovirus: NotDetec  hMPV: NotDetec  Influenza A: NotDetec  Influenza AH1: --  Influenza AH1 2009: --  Influenza AH3: --  Influenza B: NotDetec  Mycoplasma pneumoniae: NotDetec  Parainfluenza 1: NotDetec  Parainfluenza 2: NotDetec  Parainfluenza 3: NotDetec  Parainfluenza 4: NotDetec  Rapid RVP Results: NotDetec  Resp Syncytial Virus: NotDetec  SARS-CoV-2: NotDetec    --Personally reviewed the radiological imaging below:     CT Upper Extremity w/ IV Cont, Left (11.17.21 @ 18:01)  IMPRESSION:  1.  Skin defect in the mid forearm soft tissues with subjacent confluent edema concerning for cellulitis. No rim-enhancing fluid collection within the subcutaneous soft tissues 2. Radiopaque subcentimeter flecks in the subcutaneous tissues at the proximal forearm approximately 5 cm from the lateral epicondyle overlying the common extensor musculature concerning for small foreign bodies.  2.  Fluid collection within the tendon sheath at the myotendinous junction of the common extensor tendon subjacent to a skin wound at the mid forearm concerning for tenosynovitis of infectious or inflammatory etiology  3.  Confluent edema without definitive rim enhancement in the musculature of the proximal common extensor muscle subjacent to two radiopaque foreign bodies concerning for early fluid collection versus myositis. EKG, 11/18, NSR, 90bpm, FVs=367, no acute Tw or ST changes -- personally interpreted     --Personally reviewed the labs below:                        13.1   8.94  )-----------( 314      ( 17 Nov 2021 14:43 )             40.8     11-17    137  |  97<L>  |  12  ----------------------------<  240<H>  3.9   |  30  |  0.64    Ca    9.7      17 Nov 2021 14:43    TPro  8.3  /  Alb  4.3  /  TBili  0.7  /  DBili  x   /  AST  16  /  ALT  18  /  AlkPhos  120  11-17      Blood Gas Venous:  pH: 7.40 | HCO3: 33 | pCO2: 53 | pO2: 23 | Lactate: 1.6 (11-17-21 @ 18:58)      CAPILLARY BLOOD GLUCOSE:  POCT Blood Glucose: 243 mg/dL (11-18-21 @ 00:38)    COVID-19/Full RVP Panel:  11-17-21 @ 20:00  Adenovirus: NotDetec  CHlamydia pneumoniae: NotDetec  Coronavirus (229E, KHU1, NL63, OC43): --  Entero/Rhinovirus: NotDetec  hMPV: NotDetec  Influenza A: NotDetec  Influenza AH1: --  Influenza AH1 2009: --  Influenza AH3: --  Influenza B: NotDetec  Mycoplasma pneumoniae: NotDetec  Parainfluenza 1: NotDetec  Parainfluenza 2: NotDetec  Parainfluenza 3: NotDetec  Parainfluenza 4: NotDetec  Rapid RVP Results: NotDetec  Resp Syncytial Virus: NotDetec  SARS-CoV-2: NotDetec    --Personally reviewed the radiological imaging below:     CT Upper Extremity w/ IV Cont, Left (11.17.21 @ 18:01)  IMPRESSION:  1.  Skin defect in the mid forearm soft tissues with subjacent confluent edema concerning for cellulitis. No rim-enhancing fluid collection within the subcutaneous soft tissues 2. Radiopaque subcentimeter flecks in the subcutaneous tissues at the proximal forearm approximately 5 cm from the lateral epicondyle overlying the common extensor musculature concerning for small foreign bodies.  2.  Fluid collection within the tendon sheath at the myotendinous junction of the common extensor tendon subjacent to a skin wound at the mid forearm concerning for tenosynovitis of infectious or inflammatory etiology  3.  Confluent edema without definitive rim enhancement in the musculature of the proximal common extensor muscle subjacent to two radiopaque foreign bodies concerning for early fluid collection versus myositis.

## 2021-11-18 NOTE — CONSULT NOTE ADULT - TIME BILLING
Agree with above NP note.  a/p  50yo Female w/ MHx of HTN and Type 2 DM, who was bit on her left forearm by her neighbors dog (Vonda) on 11/12, she went to OSH was given tetanus shot and discharged on PO Doxycyline and topical Bacitracin    #Sinus tachycardia  -in setting of bite, stress, infection  -check echo to rule out LV or valvular dysfunction     #Cellulitis   -2/2 dog bite   -s/p I&D  -f/u wound culture  -iv abx per primary team   -id/plastics/med f/u    #HTN  -sbp stable  -cont ARB t ppx

## 2021-11-19 LAB
A1C WITH ESTIMATED AVERAGE GLUCOSE RESULT: 9.1 % — HIGH (ref 4–5.6)
ALBUMIN SERPL ELPH-MCNC: 3.5 G/DL — SIGNIFICANT CHANGE UP (ref 3.3–5)
ALP SERPL-CCNC: 95 U/L — SIGNIFICANT CHANGE UP (ref 40–120)
ALT FLD-CCNC: 16 U/L — SIGNIFICANT CHANGE UP (ref 4–33)
ANION GAP SERPL CALC-SCNC: 9 MMOL/L — SIGNIFICANT CHANGE UP (ref 7–14)
AST SERPL-CCNC: 11 U/L — SIGNIFICANT CHANGE UP (ref 4–32)
BILIRUB SERPL-MCNC: 0.5 MG/DL — SIGNIFICANT CHANGE UP (ref 0.2–1.2)
BUN SERPL-MCNC: 11 MG/DL — SIGNIFICANT CHANGE UP (ref 7–23)
CALCIUM SERPL-MCNC: 9 MG/DL — SIGNIFICANT CHANGE UP (ref 8.4–10.5)
CHLORIDE SERPL-SCNC: 100 MMOL/L — SIGNIFICANT CHANGE UP (ref 98–107)
CK SERPL-CCNC: 169 U/L — SIGNIFICANT CHANGE UP (ref 25–170)
CO2 SERPL-SCNC: 27 MMOL/L — SIGNIFICANT CHANGE UP (ref 22–31)
COVID-19 NUCLEOCAPSID GAM AB INTERP: POSITIVE
COVID-19 NUCLEOCAPSID TOTAL GAM ANTIBODY RESULT: 8.28 INDEX — HIGH
COVID-19 SPIKE DOMAIN AB INTERP: POSITIVE
COVID-19 SPIKE DOMAIN ANTIBODY RESULT: >250 U/ML — HIGH
CREAT SERPL-MCNC: 0.7 MG/DL — SIGNIFICANT CHANGE UP (ref 0.5–1.3)
ESTIMATED AVERAGE GLUCOSE: 214 — SIGNIFICANT CHANGE UP
GLUCOSE BLDC GLUCOMTR-MCNC: 181 MG/DL — HIGH (ref 70–99)
GLUCOSE BLDC GLUCOMTR-MCNC: 208 MG/DL — HIGH (ref 70–99)
GLUCOSE BLDC GLUCOMTR-MCNC: 214 MG/DL — HIGH (ref 70–99)
GLUCOSE BLDC GLUCOMTR-MCNC: 217 MG/DL — HIGH (ref 70–99)
GLUCOSE SERPL-MCNC: 199 MG/DL — HIGH (ref 70–99)
HCT VFR BLD CALC: 36 % — SIGNIFICANT CHANGE UP (ref 34.5–45)
HGB BLD-MCNC: 11.3 G/DL — LOW (ref 11.5–15.5)
MAGNESIUM SERPL-MCNC: 1.9 MG/DL — SIGNIFICANT CHANGE UP (ref 1.6–2.6)
MCHC RBC-ENTMCNC: 28.4 PG — SIGNIFICANT CHANGE UP (ref 27–34)
MCHC RBC-ENTMCNC: 31.4 GM/DL — LOW (ref 32–36)
MCV RBC AUTO: 90.5 FL — SIGNIFICANT CHANGE UP (ref 80–100)
NRBC # BLD: 0 /100 WBCS — SIGNIFICANT CHANGE UP
NRBC # FLD: 0 K/UL — SIGNIFICANT CHANGE UP
PLATELET # BLD AUTO: 283 K/UL — SIGNIFICANT CHANGE UP (ref 150–400)
POTASSIUM SERPL-MCNC: 3.8 MMOL/L — SIGNIFICANT CHANGE UP (ref 3.5–5.3)
POTASSIUM SERPL-SCNC: 3.8 MMOL/L — SIGNIFICANT CHANGE UP (ref 3.5–5.3)
PROT SERPL-MCNC: 7 G/DL — SIGNIFICANT CHANGE UP (ref 6–8.3)
RBC # BLD: 3.98 M/UL — SIGNIFICANT CHANGE UP (ref 3.8–5.2)
RBC # FLD: 12.4 % — SIGNIFICANT CHANGE UP (ref 10.3–14.5)
SARS-COV-2 IGG+IGM SERPL QL IA: 8.28 INDEX — HIGH
SARS-COV-2 IGG+IGM SERPL QL IA: >250 U/ML — HIGH
SARS-COV-2 IGG+IGM SERPL QL IA: POSITIVE
SARS-COV-2 IGG+IGM SERPL QL IA: POSITIVE
SODIUM SERPL-SCNC: 136 MMOL/L — SIGNIFICANT CHANGE UP (ref 135–145)
WBC # BLD: 6.93 K/UL — SIGNIFICANT CHANGE UP (ref 3.8–10.5)
WBC # FLD AUTO: 6.93 K/UL — SIGNIFICANT CHANGE UP (ref 3.8–10.5)

## 2021-11-19 PROCEDURE — 99254 IP/OBS CNSLTJ NEW/EST MOD 60: CPT | Mod: GC

## 2021-11-19 RX ORDER — CEFEPIME 1 G/1
2000 INJECTION, POWDER, FOR SOLUTION INTRAMUSCULAR; INTRAVENOUS EVERY 12 HOURS
Refills: 0 | Status: DISCONTINUED | OUTPATIENT
Start: 2021-11-20 | End: 2021-11-21

## 2021-11-19 RX ORDER — INSULIN GLARGINE 100 [IU]/ML
15 INJECTION, SOLUTION SUBCUTANEOUS AT BEDTIME
Refills: 0 | Status: DISCONTINUED | OUTPATIENT
Start: 2021-11-19 | End: 2021-11-24

## 2021-11-19 RX ORDER — VALSARTAN 80 MG/1
80 TABLET ORAL DAILY
Refills: 0 | Status: DISCONTINUED | OUTPATIENT
Start: 2021-11-19 | End: 2021-11-24

## 2021-11-19 RX ORDER — CEFEPIME 1 G/1
2000 INJECTION, POWDER, FOR SOLUTION INTRAMUSCULAR; INTRAVENOUS ONCE
Refills: 0 | Status: COMPLETED | OUTPATIENT
Start: 2021-11-19 | End: 2021-11-19

## 2021-11-19 RX ORDER — CEFEPIME 1 G/1
INJECTION, POWDER, FOR SOLUTION INTRAMUSCULAR; INTRAVENOUS
Refills: 0 | Status: DISCONTINUED | OUTPATIENT
Start: 2021-11-19 | End: 2021-11-21

## 2021-11-19 RX ADMIN — Medication 2: at 08:19

## 2021-11-19 RX ADMIN — CEFEPIME 100 MILLIGRAM(S): 1 INJECTION, POWDER, FOR SOLUTION INTRAMUSCULAR; INTRAVENOUS at 16:38

## 2021-11-19 RX ADMIN — Medication 1 TABLET(S): at 12:14

## 2021-11-19 RX ADMIN — Medication 100 MILLIGRAM(S): at 05:13

## 2021-11-19 RX ADMIN — ENOXAPARIN SODIUM 40 MILLIGRAM(S): 100 INJECTION SUBCUTANEOUS at 22:09

## 2021-11-19 RX ADMIN — VALSARTAN 80 MILLIGRAM(S): 80 TABLET ORAL at 13:19

## 2021-11-19 RX ADMIN — Medication 4: at 18:05

## 2021-11-19 RX ADMIN — Medication 100 MILLIGRAM(S): at 13:19

## 2021-11-19 RX ADMIN — Medication 4: at 12:15

## 2021-11-19 RX ADMIN — Medication 1 TABLET(S): at 18:06

## 2021-11-19 RX ADMIN — Medication 1 TABLET(S): at 08:20

## 2021-11-19 NOTE — CHART NOTE - NSCHARTNOTEFT_GEN_A_CORE
52yo Female w/ PMHx of HTN and Type 2 DM, who was bitten on her left forearm by her neighbor's dog (Vonda) on 11/12. Now being treated for cellulitis. Endocrine consulted for management of hyperglycemia.    A1c 9.1% today. BG in the 100s-low 200s in the hospital.     Per outpatient medication review, patient is on glimepiride 2mg daily, Trulicity 1.5mg weekly and Farxiga 5mg daily at home.    Recommend 15 units Lantus qhs, 5 units Admelog TIDac, low correction scale before bed and low correction scale before meals. Hold oral and non-insulin injectable DM agents while inpatient. FSBG before bed and before meals.    Full consult to follow.    William Tierney DO, Endocrinology Fellow  Pager [phone] from 9am to 5pm. After hours and on weekends, please call [phone].

## 2021-11-19 NOTE — PROGRESS NOTE ADULT - SUBJECTIVE AND OBJECTIVE BOX
Plastic Surgery Progress Note (pg [facility]: [id number], [facility]: [phone])    SUBJECTIVE:  Patient seen and examined at bedside this AM. No acute events overnight. AF/VSS. Pain well controlled.     OBJECTIVE:     ** VITAL SIGNS / I&O's **    Vital Signs Last 24 Hrs  T(C): 37 (19 Nov 2021 05:26), Max: 37.2 (18 Nov 2021 14:15)  T(F): 98.6 (19 Nov 2021 05:26), Max: 99 (18 Nov 2021 14:15)  HR: 89 (19 Nov 2021 05:26) (89 - 99)  BP: 100/54 (19 Nov 2021 05:26) (100/54 - 122/78)  BP(mean): --  RR: 18 (19 Nov 2021 05:26) (16 - 18)  SpO2: 100% (19 Nov 2021 05:26) (100% - 100%)        PHYSICAL EXAM:    General: Alert, NAD  Respiratory: non-labored breathing  Extremities: LUE serous draining from both puncture sites. Erythema and edema extending to elbow and distally to forearm stable         **MEDS**  acetaminophen     Tablet .. 650 milliGRAM(s) Oral every 6 hours PRN  amLODIPine   Tablet 5 milliGRAM(s) Oral daily  clindamycin IVPB 900 milliGRAM(s) IV Intermittent every 8 hours  dextrose 40% Gel 15 Gram(s) Oral once  dextrose 5%. 1000 milliLiter(s) IV Continuous <Continuous>  dextrose 5%. 1000 milliLiter(s) IV Continuous <Continuous>  dextrose 50% Injectable 25 Gram(s) IV Push once  dextrose 50% Injectable 12.5 Gram(s) IV Push once  dextrose 50% Injectable 25 Gram(s) IV Push once  enoxaparin Injectable 40 milliGRAM(s) SubCutaneous at bedtime  glucagon  Injectable 1 milliGRAM(s) IntraMuscular once  HYDROmorphone  Injectable 0.5 milliGRAM(s) IV Push every 3 hours PRN  insulin glargine Injectable (LANTUS) 10 Unit(s) SubCutaneous at bedtime  insulin lispro (ADMELOG) corrective regimen sliding scale   SubCutaneous three times a day before meals  insulin lispro (ADMELOG) corrective regimen sliding scale   SubCutaneous at bedtime  lactobacillus acidophilus 1 Tablet(s) Oral three times a day with meals  levoFLOXacin IVPB      levoFLOXacin IVPB 750 milliGRAM(s) IV Intermittent every 24 hours  melatonin 3 milliGRAM(s) Oral at bedtime PRN  sodium chloride 0.9%. 1000 milliLiter(s) IV Continuous <Continuous>  valsartan 160 milliGRAM(s) Oral daily      ** LABS **                          11.3   6.93  )-----------( 283      ( 19 Nov 2021 07:07 )             36.0     19 Nov 2021 07:07    136    |  100    |  11     ----------------------------<  199    3.8     |  27     |  0.70     Ca    9.0        19 Nov 2021 07:07  Phos  4.0       18 Nov 2021 07:10  Mg     1.90      19 Nov 2021 07:07    TPro  7.0    /  Alb  3.5    /  TBili  0.5    /  DBili  x      /  AST  11     /  ALT  16     /  AlkPhos  95     19 Nov 2021 07:07      CAPILLARY BLOOD GLUCOSE      POCT Blood Glucose.: 173 mg/dL (18 Nov 2021 22:38)  POCT Blood Glucose.: 210 mg/dL (18 Nov 2021 15:57)  POCT Blood Glucose.: 180 mg/dL (18 Nov 2021 13:26)  POCT Blood Glucose.: 256 mg/dL (18 Nov 2021 09:13)  POCT Blood Glucose.: 206 mg/dL (18 Nov 2021 08:06)    CARDIAC MARKERS ( 19 Nov 2021 07:07 )  x     / x     / 169 U/L / x     / x      CARDIAC MARKERS ( 18 Nov 2021 07:28 )  x     / x     / 320 U/L / x     / x

## 2021-11-19 NOTE — PROGRESS NOTE ADULT - ASSESSMENT
51 y.o F w/ a hx of DM that was admitted for worsening cellulitis and infection secondary to a dog bite to the [facility]    Plan:  - Continue drainage with gauze and pressing/expressing 3x a day  - pack wound with moistened gauze and wrap with kerlix.   - f/u cultures  - IV abx  - care per primary team  - planning on bedside I&D for source control.     [id number] PRS

## 2021-11-19 NOTE — PROGRESS NOTE ADULT - ASSESSMENT
50yo Female w/ MHx of HTN and Type 2 DM, who was bit on her left forearm by her neighbors dog (Vonda) on 11/12, she went to OSH was given tetanus shot and discharged on PO Doxycyline and topical Bacitracin    #Cellulitis   -2/2 dog bite   -CT UE noted  -s/p I&D  -f/u wound culture  -iv abx per primary team   -ID eval done , abx changed to cefapime   -plastics following     #Sinus tachycardia  -HR improved  -likely infectious driven  -check echo to rule out LV or valvular dysfunction     #HTN  -sbp stable - however pt did not receive bp meds today  -can hold norvasc   -cont ARB with hold parameters   -cont to monitor     DM-2 :  -hold oral meds   seen by endo   c/w lantus / FSBS     dvt ppx     radha pitts MD

## 2021-11-19 NOTE — PROGRESS NOTE ADULT - ASSESSMENT
a/p  52yo Female w/ MHx of HTN and Type 2 DM, who was bit on her left forearm by her neighbors dog (Vonda) on 11/12, she went to OSH was given tetanus shot and discharged on PO Doxycyline and topical Bacitracin    #Sinus tachycardia  -HR improved, asymptomatic   -likely infectious driven  -check echo to rule out LV or valvular dysfunction -  can be done as outpt     #Cellulitis   -2/2 dog bite   -CT UE noted  -s/p I&D  -wound culture noted   -iv abx per primary team   -ID eval  -plastics / med f/u    #HTN  -sbp stable - however pt did not receive bp meds today  -hold norvasc   -Dec ARB to 80 mg QD   -cont to monitor     dvt ppx     plan discussed with ACP

## 2021-11-19 NOTE — ADVANCED PRACTICE NURSE CONSULT - REASON FOR CONSULT
Patient appropriately being seeing by plastic surgery for dog bite in extremities. Orders in the chart.

## 2021-11-19 NOTE — PROGRESS NOTE ADULT - SUBJECTIVE AND OBJECTIVE BOX
Patient is a 51y old  Female who presents with a chief complaint of Arm swelling due to dog bite (19 Nov 2021 18:29)      INTERVAL HPI/OVERNIGHT EVENTS: still c/o pain     T(C): 36.6 (11-19-21 @ 14:25), Max: 37.2 (11-19-21 @ 12:15)  HR: 90 (11-19-21 @ 14:25) (85 - 90)  BP: 110/72 (11-19-21 @ 14:25) (100/54 - 112/67)  RR: 20 (11-19-21 @ 14:25) (18 - 20)  SpO2: 100% (11-19-21 @ 14:25) (98% - 100%)  Wt(kg): --  I&O's Summary      PAST MEDICAL & SURGICAL HISTORY:  HTN (hypertension)    Type 2 diabetes mellitus    No significant past surgical history        SOCIAL HISTORY  Alcohol:  Tobacco:  Illicit substance use:    FAMILY HISTORY:    REVIEW OF SYSTEMS:  CONSTITUTIONAL: No fever, weight loss, or fatigue  EYES: No eye pain, visual disturbances, or discharge  ENMT:  No difficulty hearing, tinnitus, vertigo; No sinus or throat pain  NECK: No pain or stiffness  RESPIRATORY: No cough, wheezing, chills or hemoptysis; No shortness of breath  CARDIOVASCULAR: No chest pain, palpitations, dizziness, or leg swelling  GASTROINTESTINAL: No abdominal or epigastric pain. No nausea, vomiting, or hematemesis; No diarrhea or constipation. No melena or hematochezia.  GENITOURINARY: No dysuria, frequency, hematuria, or incontinence  NEUROLOGICAL: No headaches, memory loss, loss of strength, numbness, or tremors  SKIN: No itching, burning, rashes, or lesions   LYMPH NODES: No enlarged glands  ENDOCRINE: No heat or cold intolerance; No hair loss  MUSCULOSKELETAL: No joint pain or swelling; No muscle, back, or extremity pain  PSYCHIATRIC: No depression, anxiety, mood swings, or difficulty sleeping  HEME/LYMPH: No easy bruising, or bleeding gums  ALLERY AND IMMUNOLOGIC: No hives or eczema    RADIOLOGY & ADDITIONAL TESTS:    Imaging Personally Reviewed:  [ ] YES  [ ] NO    Consultant(s) Notes Reviewed:  [ ] YES  [ ] NO    PHYSICAL EXAM:  GENERAL: NAD, well-groomed, well-developed  HEAD:  Atraumatic, Normocephalic  EYES: EOMI, PERRLA, conjunctiva and sclera clear  ENMT: No tonsillar erythema, exudates, or enlargement; Moist mucous membranes, Good dentition, No lesions  NECK: Supple, No JVD, Normal thyroid  NERVOUS SYSTEM:  Alert & Oriented X3, Good concentration; Motor Strength 5/5 B/L upper and lower extremities; DTRs 2+ intact and symmetric  CHEST/LUNG: Clear to percussion bilaterally; No rales, rhonchi, wheezing, or rubs  HEART: Regular rate and rhythm; No murmurs, rubs, or gallops  ABDOMEN: Soft, Nontender, Nondistended; Bowel sounds present  EXTREMITIES:  2+ Peripheral Pulses, No clubbing, cyanosis, or edema  LYMPH: No lymphadenopathy noted  SKIN: No rashes or lesions    LABS:                        11.3   6.93  )-----------( 283      ( 19 Nov 2021 07:07 )             36.0     11-19    136  |  100  |  11  ----------------------------<  199<H>  3.8   |  27  |  0.70    Ca    9.0      19 Nov 2021 07:07  Phos  4.0     11-18  Mg     1.90     11-19    TPro  7.0  /  Alb  3.5  /  TBili  0.5  /  DBili  x   /  AST  11  /  ALT  16  /  AlkPhos  95  11-19        CAPILLARY BLOOD GLUCOSE      POCT Blood Glucose.: 217 mg/dL (19 Nov 2021 21:07)  POCT Blood Glucose.: 208 mg/dL (19 Nov 2021 17:28)  POCT Blood Glucose.: 214 mg/dL (19 Nov 2021 11:52)  POCT Blood Glucose.: 181 mg/dL (19 Nov 2021 08:14)            MEDICATIONS  (STANDING):  cefepime   IVPB      dextrose 40% Gel 15 Gram(s) Oral once  dextrose 5%. 1000 milliLiter(s) (50 mL/Hr) IV Continuous <Continuous>  dextrose 5%. 1000 milliLiter(s) (100 mL/Hr) IV Continuous <Continuous>  dextrose 50% Injectable 25 Gram(s) IV Push once  dextrose 50% Injectable 12.5 Gram(s) IV Push once  dextrose 50% Injectable 25 Gram(s) IV Push once  enoxaparin Injectable 40 milliGRAM(s) SubCutaneous at bedtime  glucagon  Injectable 1 milliGRAM(s) IntraMuscular once  insulin glargine Injectable (LANTUS) 15 Unit(s) SubCutaneous at bedtime  insulin lispro (ADMELOG) corrective regimen sliding scale   SubCutaneous three times a day before meals  insulin lispro (ADMELOG) corrective regimen sliding scale   SubCutaneous at bedtime  lactobacillus acidophilus 1 Tablet(s) Oral three times a day with meals  sodium chloride 0.9%. 1000 milliLiter(s) (100 mL/Hr) IV Continuous <Continuous>  valsartan 80 milliGRAM(s) Oral daily    MEDICATIONS  (PRN):  acetaminophen     Tablet .. 650 milliGRAM(s) Oral every 6 hours PRN Temp greater or equal to 38C (100.4F), Mild Pain (1 - 3), Moderate Pain (4 - 6)  HYDROmorphone  Injectable 0.5 milliGRAM(s) IV Push every 3 hours PRN Moderate Pain (4 - 6)  melatonin 3 milliGRAM(s) Oral at bedtime PRN Insomnia      Care Discussed with Consultants/Other Providers [ ] YES  [ ] NO

## 2021-11-19 NOTE — CONSULT NOTE ADULT - ASSESSMENT
51 years old female with PMHx of HTN and Type 2 DM, who was bit on her left forearm by her neighbors dog (Vonda) on 11/12, presented with worseing cellulitis    Unprovoked bite, owner is unclear about dog's vaccination status  Animal control is following  Received tetanus and PO doxy at [place], but cellulitis progressed   CT left arm showed fluid collection within tendon sheath, consistent with tenosynovitis of common extensor tendon; 2 foreign bodies, but no subcutaneous abscess  Abscess Cx 11/18 E. coli, Klebsiella and E. avium    Antibiotic:  Cipro 11/17  Clindamycin 11/17 -   Levaquin 11/18 -     No history of splenectomy  No dysphagia, hydrophobia; no clinical symptoms of rabies  No clinical signs of septic arthritis of left elbow  No clinical signs of compartment syndrome  Having difficulty extending her wrist and her fingers  This could progress to limb threatening infection so will need close follow up from surgery  Will change to cefepime to cover for GNR; E. avium has low virulence so will not cover for it at this point      IMPRESSION:  Left forearm tenosynovitis and myositis  Foreign body in left arm      RECOMMENDATIONS:  - D/C Levaquin and clindamycin  - Start IV cefepime 2gm q12hrs (aware of penicillin allergy; monitor for allergy reaction while on cefepime)  - Check 2 sets of blood culture  - Follow up on sensitivities of abscess culture  - Plastic follow up  - Will continue to follow      Patient is seen and examined with attending and case is discussed with primary team      Debbie Zambrano DO  PGY4 ID fellow  Pager: [phone]  [facility] pager ID: [phone]  Please contact me via page or text through [employer] Teams  If after 5PM or on weekends, please call [phone]     51 years old female with PMHx of HTN and Type 2 DM, who was bit on her left forearm by her neighbors dog (Vonda) on 11/12, presented with worseing cellulitis    Unprovoked bite, owner is unclear about dog's vaccination status  Animal control is following  Received tetanus and PO doxy at [place], but cellulitis progressed   CT left arm showed fluid collection within tendon sheath, consistent with tenosynovitis of common extensor tendon; 2 foreign bodies, but no subcutaneous abscess  Abscess Cx 11/18 E. coli, Klebsiella and E. avium    Antibiotic:  Cipro 11/17  Clindamycin 11/17 -   Levaquin 11/18 -     No history of splenectomy  No dysphagia, hydrophobia; no clinical symptoms of rabies  No clinical signs of septic arthritis of left elbow  No clinical signs of compartment syndrome  Having difficulty extending her wrist and her fingers  This could progress to limb threatening infection so will need close follow up from surgery  Will change to cefepime to cover for GNR; E. avium has low virulence so will not cover for it at this point      IMPRESSION:  Left forearm tenosynovitis and myositis  Foreign body in left arm  positive culture finding  cellulitis       RECOMMENDATIONS:  - D/C Levaquin and clindamycin  - Start IV cefepime 2gm q12hrs (aware of penicillin allergy; monitor for allergy reaction while on cefepime)  - Check 2 sets of blood culture  - Follow up on sensitivities of abscess culture  - Plastic follow up  - c/w wound care   - glycemic control for optimal wound healing       Patient is seen and examined with attending and case is discussed with primary team      Debbie Zambrano DO  PGY4 ID fellow  Pager: [phone]  [facility] pager ID: [phone]  Please contact me via page or text through [employer] Teams  If after 5PM or on weekends, please call [phone]

## 2021-11-19 NOTE — CONSULT NOTE ADULT - SUBJECTIVE AND OBJECTIVE BOX
INFECTIOUS DISEASE CONSULT NOTE    Patient is a 51y old  Female who presents with a chief complaint of Arm swelling due to dog bite (19 Nov 2021 11:14)    HPI:  52yo Female w/ MHx of HTN and Type 2 DM, who was bit on her left forearm by her neighbors dog (Vonda) on 11/12, she went to OSH was given tetanus shot and discharged on PO Doxycyline and topical Bacitracin. Despite taking antibiotics as directed, the pt noted that her left arm was becoming increasingly swollen, warm and red. She was having decreased ROM due to swelling and pain, and the wound started draining yellow "pus" drainage that had a foul smell. She was concerned for worsening infection and came to [facility] for further evaluation. Pt denies any fevers, endorses intermittent chills. Only other complaint at this time is HA which she attributes to not eating all day. Pt denies chest pain, SOB, SANCHEZ, sick contacts, abdominal pain, N/V/D/C, dysuria, hematuria.     ED Course: Pt has remained afebrile. s/p IV Ciprofloxacin, IV Clindamycin and Tylenol. Seen by plastic surgery team, s/p drainage, saline irrigation and gauze packing    ID is consulted for antibiotic management       REVIEW OF SYSTEMS:  CONSTITUTIONAL: No fever or chills  HEENT: No sore throat  RESPIRATORY: No cough; no shortness of breath  CARDIOVASCULAR: No chest pain or palpitations  GASTROINTESTINAL: No abdominal or epigastric pain; no nausea, vomiting; no diarrhea or constipation;  GENITOURINARY: No dysuria  NEUROLOGICAL: No headache/dizziness  MSK: Pain on left forearm, difficulty extending left wrist and fingers  SKIN: + 2 open wounds with purulent drainage, multiple other wounds on left forearm    All other ROS negative except noted above    Prior hospital charts reviewed [Yes]  Primary team notes reviewed [Yes]  Other consultant notes reviewed [Yes]    PAST MEDICAL & SURGICAL HISTORY:  HTN (hypertension)    Type 2 diabetes mellitus    No significant past surgical history        SOCIAL HISTORY:  - No recent travel  - Denies tobacco use  - Denies alcohol use  - Denies illicit drug use    FAMILY HISTORY:  FH: type 2 diabetes        Allergies:  penicillins (Hives; Urticaria)      ANTIMICROBIALS:  cefepime   IVPB        ANTIMICROBIALS (past 90 days):  MEDICATIONS  (STANDING):    cefepime   IVPB   100 mL/Hr IV Intermittent (11-19-21 @ 16:38)    ciprofloxacin   IVPB   200 mL/Hr IV Intermittent (11-17-21 @ 15:39)    clindamycin IVPB   100 mL/Hr IV Intermittent (11-19-21 @ 13:19)   100 mL/Hr IV Intermittent (11-19-21 @ 05:13)   100 mL/Hr IV Intermittent (11-18-21 @ 23:17)   100 mL/Hr IV Intermittent (11-18-21 @ 14:29)   100 mL/Hr IV Intermittent (11-18-21 @ 04:12)    clindamycin IVPB   100 mL/Hr IV Intermittent (11-17-21 @ 14:17)    levoFLOXacin IVPB   750 milliGRAM(s) IV Intermittent (11-18-21 @ 05:47)    levoFLOXacin IVPB   100 mL/Hr IV Intermittent (11-19-21 @ 05:45)        OTHER MEDS:   MEDICATIONS  (STANDING):  acetaminophen     Tablet .. 650 every 6 hours PRN  dextrose 40% Gel 15 once  dextrose 50% Injectable 25 once  dextrose 50% Injectable 12.5 once  dextrose 50% Injectable 25 once  enoxaparin Injectable 40 at bedtime  glucagon  Injectable 1 once  HYDROmorphone  Injectable 0.5 every 3 hours PRN  insulin glargine Injectable (LANTUS) 10 at bedtime  insulin lispro (ADMELOG) corrective regimen sliding scale  three times a day before meals  insulin lispro (ADMELOG) corrective regimen sliding scale  at bedtime  melatonin 3 at bedtime PRN  valsartan 80 daily      VITALS:  Vital Signs Last 24 Hrs  T(F): 97.8 (11-19-21 @ 14:25), Max: 99 (11-18-21 @ 14:15)    Vital Signs Last 24 Hrs  HR: 90 (11-19-21 @ 14:25) (85 - 92)  BP: 110/72 (11-19-21 @ 14:25) (100/54 - 122/78)  RR: 20 (11-19-21 @ 14:25)  SpO2: 100% (11-19-21 @ 14:25) (98% - 100%)      EXAM:  GENERAL: NAD, lying in bed comfortably  HEAD: Atraumatic  EYES: Conjunctiva pink and cornea white  ENT: moist mucous membranes  NECK: Supple, nontender to palpation  CHEST/LUNG: Clear to auscultation bilaterally  HEART: S1 S2  ABDOMEN: Soft, nontender, nondistended; normoactive bowel sounds  EXTREMITIES: Significant swelling and erythema on left forearm, two open wounds with packing and several superficial wounds with scabs, able to move elbow but with limited ROM; unable to fully extend left wrist and fingers, no numbenss/tingling of left fingers or hand  NERVOUS SYSTEM: Alert and oriented to person, time, place and situation, speech clear  MSK: left hand swelling  SKIN: as described above; no superficial thrombophlebitis    Labs:                        11.3   6.93  )-----------( 283      ( 19 Nov 2021 07:07 )             36.0     11-19    136  |  100  |  11  ----------------------------<  199<H>  3.8   |  27  |  0.70    Ca    9.0      19 Nov 2021 07:07  Phos  4.0     11-18  Mg     1.90     11-19    TPro  7.0  /  Alb  3.5  /  TBili  0.5  /  DBili  x   /  AST  11  /  ALT  16  /  AlkPhos  95  11-19      WBC Trend:  WBC Count: 6.93 (11-19-21 @ 07:07)  WBC Count: 8.85 (11-18-21 @ 07:10)  WBC Count: 8.94 (11-17-21 @ 14:43)      Auto Neutrophil #: 6.57 K/uL (11-18-21 @ 07:10)  Auto Neutrophil #: 6.84 K/uL (11-17-21 @ 14:43)      Creatine Trend:  Creatinine, Serum: 0.70 (11-19)  Creatinine, Serum: 0.62 (11-18)  Creatinine, Serum: 0.64 (11-17)      Liver Biochemical Testing Trend:  Alanine Aminotransferase (ALT/SGPT): 16 (11-19)  Alanine Aminotransferase (ALT/SGPT): 13 (11-18)  Alanine Aminotransferase (ALT/SGPT): 18 (11-17)  Aspartate Aminotransferase (AST/SGOT): 11 (11-19-21 @ 07:07)  Aspartate Aminotransferase (AST/SGOT): 21 (11-18-21 @ 07:10)  Aspartate Aminotransferase (AST/SGOT): 16 (11-17-21 @ 14:43)  Bilirubin Total, Serum: 0.5 (11-19)  Bilirubin Total, Serum: 0.8 (11-18)  Bilirubin Total, Serum: 0.7 (11-17)      Trend LDH      Auto Eosinophil %: 1.1 % (11-18-21 @ 07:10)  Auto Eosinophil %: 0.9 % (11-17-21 @ 14:43)          MICROBIOLOGY:        Culture - Abscess with Gram Stain (collected 18 Nov 2021 02:23)  Source: .Abscess Left forerarm  Preliminary Report:    Few Escherichia coli    Rare Klebsiella pneumoniae    Numerous Enterococcus avium      Rapid RVP Result: NotDetec (11-17 @ 20:00)      COVID-19 Edy Domain AB Interp: Positive (11-19-21 @ 10:27)  COVID-19 Nucleocapsid MICHI AB Interp: Positive (11-19-21 @ 10:27)      Blood Gas Venous - Lactate: 1.6 (11-17 @ 18:58)    A1C with Estimated Average Glucose Result: 9.1 % (11-19-21 @ 07:07)      RADIOLOGY:  imaging below personally reviewed      < from: CT Upper Extremity w/ IV Cont, Left (11.17.21 @ 18:01) >  IMPRESSION:    1.  Skin defect in the mid forearm soft tissues with subjacent confluent edema concerning for cellulitis. No rim-enhancing fluid collection within the subcutaneous soft tissues 2. Radiopaque subcentimeter flecks in the subcutaneous tissues at the proximal forearm approximately 5 cm from the lateral epicondyle overlying the common extensor musculature concerning for small foreign bodies.  2.  Fluid collection within the tendon sheathat the myotendinous junction of the common extensor tendon subjacent to a skin wound at the mid forearm concerning for tenosynovitis of infectious or inflammatory etiology  3.  Confluent edema without definitive rim enhancement in the musculature of the proximal common extensor muscle subjacent to two radiopaque foreign bodies concerning for early fluid collection versus myositis.   INFECTIOUS DISEASE CONSULT NOTE    Patient is a 51y old  Female who presents with a chief complaint of Arm swelling due to dog bite (19 Nov 2021 11:14)    HPI:  52yo Female w/ MHx of HTN and Type 2 DM, who was bit on her left forearm by her neighbors dog (Vonda) on 11/12, she went to OSH was given tetanus shot and discharged on PO Doxycyline and topical Bacitracin. Despite taking antibiotics as directed, the pt noted that her left arm was becoming increasingly swollen, warm and red. She was having decreased ROM due to swelling and pain, and the wound started draining yellow "pus" drainage that had a foul smell. She was concerned for worsening infection and came to [facility] for further evaluation. Pt denies any fevers, endorses intermittent chills. Only other complaint at this time is HA which she attributes to not eating all day. Pt denies chest pain, SOB, SANCHEZ, sick contacts, abdominal pain, N/V/D/C, dysuria, hematuria.     ED Course: Pt has remained afebrile. s/p IV Ciprofloxacin, IV Clindamycin and Tylenol. Seen by plastic surgery team, s/p drainage, saline irrigation and gauze packing    ID is consulted for antibiotic management       REVIEW OF SYSTEMS:  CONSTITUTIONAL: No fever or chills  Eyes: no discharge   ENT: No sore throat  RESPIRATORY: No cough; no shortness of breath  CARDIOVASCULAR: No chest pain  GASTROINTESTINAL: No abdominal or epigastric pain; no nausea, vomiting;  GENITOURINARY: No dysuria  NEUROLOGICAL: No headache/dizziness  MSK: Pain on left forearm, difficulty extending left wrist and fingers  SKIN: + 2 open wounds with purulent drainage, multiple other wounds on left forearm  Endocrine: no polyuria or polydipsia  Psych: no anxiety.     All other ROS negative except noted above    Prior hospital charts reviewed [Yes]  Primary team notes reviewed [Yes]  Other consultant notes reviewed [Yes]    PAST MEDICAL & SURGICAL HISTORY:  HTN (hypertension)    Type 2 diabetes mellitus    No significant past surgical history        SOCIAL HISTORY:  - No recent travel  - Denies tobacco use  - Denies alcohol use  - Denies illicit drug use      FAMILY HISTORY:  FH: type 2 diabetes        Allergies:  penicillins (Hives; Urticaria)      ANTIMICROBIALS:  cefepime   IVPB        ANTIMICROBIALS (past 90 days):  MEDICATIONS  (STANDING):    cefepime   IVPB   100 mL/Hr IV Intermittent (11-19-21 @ 16:38)    ciprofloxacin   IVPB   200 mL/Hr IV Intermittent (11-17-21 @ 15:39)    clindamycin IVPB   100 mL/Hr IV Intermittent (11-19-21 @ 13:19)   100 mL/Hr IV Intermittent (11-19-21 @ 05:13)   100 mL/Hr IV Intermittent (11-18-21 @ 23:17)   100 mL/Hr IV Intermittent (11-18-21 @ 14:29)   100 mL/Hr IV Intermittent (11-18-21 @ 04:12)    clindamycin IVPB   100 mL/Hr IV Intermittent (11-17-21 @ 14:17)    levoFLOXacin IVPB   750 milliGRAM(s) IV Intermittent (11-18-21 @ 05:47)    levoFLOXacin IVPB   100 mL/Hr IV Intermittent (11-19-21 @ 05:45)        OTHER MEDS:   MEDICATIONS  (STANDING):  acetaminophen     Tablet .. 650 every 6 hours PRN  dextrose 40% Gel 15 once  dextrose 50% Injectable 25 once  dextrose 50% Injectable 12.5 once  dextrose 50% Injectable 25 once  enoxaparin Injectable 40 at bedtime  glucagon  Injectable 1 once  HYDROmorphone  Injectable 0.5 every 3 hours PRN  insulin glargine Injectable (LANTUS) 10 at bedtime  insulin lispro (ADMELOG) corrective regimen sliding scale  three times a day before meals  insulin lispro (ADMELOG) corrective regimen sliding scale  at bedtime  melatonin 3 at bedtime PRN  valsartan 80 daily      VITALS:  Vital Signs Last 24 Hrs  T(F): 97.8 (11-19-21 @ 14:25), Max: 99 (11-18-21 @ 14:15)    Vital Signs Last 24 Hrs  HR: 90 (11-19-21 @ 14:25) (85 - 92)  BP: 110/72 (11-19-21 @ 14:25) (100/54 - 122/78)  RR: 20 (11-19-21 @ 14:25)  SpO2: 100% (11-19-21 @ 14:25) (98% - 100%)      EXAM:  GENERAL: NAD, lying in bed comfortably  HEAD: Atraumatic  EYES: Conjunctiva pink and cornea white  ENT: moist mucous membranes  NECK: Supple,  CHEST/LUNG: Clear to auscultation bilaterally  HEART: S1 S2 normal   ABDOMEN: Soft, nontender, nondistended;   : No spear   EXTREMITIES: Significant swelling and erythema on left forearm, two open wounds with packing and several superficial wounds with scabs, able to move elbow but with limited ROM; unable to fully extend left wrist and fingers, no numbenss/tingling of left fingers or hand  NERVOUS SYSTEM: Alert and oriented to person, time, place  MSK: left hand swelling  SKIN: as described above; no superficial thrombophlebitis      Labs:                        11.3   6.93  )-----------( 283      ( 19 Nov 2021 07:07 )             36.0     11-19    136  |  100  |  11  ----------------------------<  199<H>  3.8   |  27  |  0.70    Ca    9.0      19 Nov 2021 07:07  Phos  4.0     11-18  Mg     1.90     11-19    TPro  7.0  /  Alb  3.5  /  TBili  0.5  /  DBili  x   /  AST  11  /  ALT  16  /  AlkPhos  95  11-19      WBC Trend:  WBC Count: 6.93 (11-19-21 @ 07:07)  WBC Count: 8.85 (11-18-21 @ 07:10)  WBC Count: 8.94 (11-17-21 @ 14:43)      Auto Neutrophil #: 6.57 K/uL (11-18-21 @ 07:10)  Auto Neutrophil #: 6.84 K/uL (11-17-21 @ 14:43)      Creatine Trend:  Creatinine, Serum: 0.70 (11-19)  Creatinine, Serum: 0.62 (11-18)  Creatinine, Serum: 0.64 (11-17)      Liver Biochemical Testing Trend:  Alanine Aminotransferase (ALT/SGPT): 16 (11-19)  Alanine Aminotransferase (ALT/SGPT): 13 (11-18)  Alanine Aminotransferase (ALT/SGPT): 18 (11-17)  Aspartate Aminotransferase (AST/SGOT): 11 (11-19-21 @ 07:07)  Aspartate Aminotransferase (AST/SGOT): 21 (11-18-21 @ 07:10)  Aspartate Aminotransferase (AST/SGOT): 16 (11-17-21 @ 14:43)  Bilirubin Total, Serum: 0.5 (11-19)  Bilirubin Total, Serum: 0.8 (11-18)  Bilirubin Total, Serum: 0.7 (11-17)    Auto Eosinophil %: 1.1 % (11-18-21 @ 07:10)  Auto Eosinophil %: 0.9 % (11-17-21 @ 14:43)        MICROBIOLOGY:    Culture - Abscess with Gram Stain (collected 18 Nov 2021 02:23)  Source: .Abscess Left forerarm  Preliminary Report:    Few Escherichia coli    Rare Klebsiella pneumoniae    Numerous Enterococcus avium      Rapid RVP Result: NotDetec (11-17 @ 20:00)      COVID-19 Edy Domain AB Interp: Positive (11-19-21 @ 10:27)  COVID-19 Nucleocapsid MICHI AB Interp: Positive (11-19-21 @ 10:27)      Blood Gas Venous - Lactate: 1.6 (11-17 @ 18:58)    A1C with Estimated Average Glucose Result: 9.1 % (11-19-21 @ 07:07)      RADIOLOGY:  imaging below personally reviewed    < from: CT Upper Extremity w/ IV Cont, Left (11.17.21 @ 18:01) >  IMPRESSION:    1.  Skin defect in the mid forearm soft tissues with subjacent confluent edema concerning for cellulitis. No rim-enhancing fluid collection within the subcutaneous soft tissues 2. Radiopaque subcentimeter flecks in the subcutaneous tissues at the proximal forearm approximately 5 cm from the lateral epicondyle overlying the common extensor musculature concerning for small foreign bodies.  2.  Fluid collection within the tendon sheathat the myotendinous junction of the common extensor tendon subjacent to a skin wound at the mid forearm concerning for tenosynovitis of infectious or inflammatory etiology  3.  Confluent edema without definitive rim enhancement in the musculature of the proximal common extensor muscle subjacent to two radiopaque foreign bodies concerning for early fluid collection versus myositis.

## 2021-11-19 NOTE — PROGRESS NOTE ADULT - SUBJECTIVE AND OBJECTIVE BOX
CARDIOLOGY FOLLOW UP - Dr. David  Date of Service: 11/19/21  CC: denies cp, sob, and palpitations     Review of Systems:  Constitutional: No fever, weight loss, or fatigue  Respiratory: No cough, wheezing, or hemoptysis, no shortness of breath  Cardiovascular: No chest pain, palpitations, passing out, dizziness, or leg swelling  Gastrointestinal: No abd or epigastric pain.  No nausea, vomiting, or hematemesis; no diarrhea or constipation, no melena or hematochezia  Vascular: no edema       PHYSICAL EXAM:  T(C): 37 (11-19-21 @ 05:26), Max: 37.2 (11-18-21 @ 14:15)  HR: 89 (11-19-21 @ 05:26) (89 - 99)  BP: 100/54 (11-19-21 @ 05:26) (100/54 - 122/78)  RR: 18 (11-19-21 @ 05:26) (16 - 18)  SpO2: 100% (11-19-21 @ 05:26) (100% - 100%)  Wt(kg): --  I&O's Summary      Appearance: Normal	  Cardiovascular: Normal S1 S2,RRR, No JVD, No murmurs  Respiratory: Lungs clear to auscultation	  Gastrointestinal:  Soft, Non-tender, + BS	  Extremities: Normal range of motion, No clubbing, cyanosis or edema      Home Medications:  amLODIPine 5 mg oral tablet: 1 tab(s) orally once a day (18 Nov 2021 15:13)  bacitracin 500 units/g topical ointment: Apply topically to affected area 2 times a day (18 Nov 2021 15:13)  Farxiga 5 mg oral tablet: 1 tab(s) orally once a day (18 Nov 2021 15:13)  glimepiride 2 mg oral tablet: 1 tab(s) orally once a day (18 Nov 2021 15:13)  Trulicity Pen 1.5 mg/0.5 mL subcutaneous solution: 1 dose(s) subcutaneous once a week on saturday (18 Nov 2021 15:13)  Tylenol 500 mg oral tablet: 1 tab(s) orally every 6 hours, As Needed (18 Nov 2021 15:13)  valsartan 160 mg oral capsule: 1 cap(s) orally once a day (18 Nov 2021 15:13)      MEDICATIONS  (STANDING):  amLODIPine   Tablet 5 milliGRAM(s) Oral daily  clindamycin IVPB 900 milliGRAM(s) IV Intermittent every 8 hours  dextrose 40% Gel 15 Gram(s) Oral once  dextrose 5%. 1000 milliLiter(s) (50 mL/Hr) IV Continuous <Continuous>  dextrose 5%. 1000 milliLiter(s) (100 mL/Hr) IV Continuous <Continuous>  dextrose 50% Injectable 25 Gram(s) IV Push once  dextrose 50% Injectable 12.5 Gram(s) IV Push once  dextrose 50% Injectable 25 Gram(s) IV Push once  enoxaparin Injectable 40 milliGRAM(s) SubCutaneous at bedtime  glucagon  Injectable 1 milliGRAM(s) IntraMuscular once  insulin glargine Injectable (LANTUS) 10 Unit(s) SubCutaneous at bedtime  insulin lispro (ADMELOG) corrective regimen sliding scale   SubCutaneous three times a day before meals  insulin lispro (ADMELOG) corrective regimen sliding scale   SubCutaneous at bedtime  lactobacillus acidophilus 1 Tablet(s) Oral three times a day with meals  levoFLOXacin IVPB      levoFLOXacin IVPB 750 milliGRAM(s) IV Intermittent every 24 hours  sodium chloride 0.9%. 1000 milliLiter(s) (100 mL/Hr) IV Continuous <Continuous>  valsartan 160 milliGRAM(s) Oral daily      TELEMETRY: 	    ECG:  	  RADIOLOGY:   DIAGNOSTIC TESTING:  [ ] Echocardiogram:  [ ]  Catheterization:  [ ] Stress Test:    OTHER: 	    LABS:	 	    Creatine Kinase, Serum: 169 U/L [25 - 170] (11-19 @ 07:07)  Creatine Kinase, Serum: 320 U/L [25 - 170] (11-18 @ 07:28)                          11.3   6.93  )-----------( 283      ( 19 Nov 2021 07:07 )             36.0     11-19    136  |  100  |  11  ----------------------------<  199<H>  3.8   |  27  |  0.70    Ca    9.0      19 Nov 2021 07:07  Phos  4.0     11-18  Mg     1.90     11-19    TPro  7.0  /  Alb  3.5  /  TBili  0.5  /  DBili  x   /  AST  11  /  ALT  16  /  AlkPhos  95  11-19

## 2021-11-19 NOTE — PROGRESS NOTE ADULT - TIME BILLING
agree with above  cv stable  HR improved, asymptomatic   check echo to rule out LV or valvular dysfunction -  can be done as outpt   IV per primary team   plastics / med f/u  stable - however pt did not receive bp meds today  hold norvasc   Dec ARB to 80 mg QD   dvt ppx

## 2021-11-20 DIAGNOSIS — E78.5 HYPERLIPIDEMIA, UNSPECIFIED: ICD-10-CM

## 2021-11-20 LAB
-  AMIKACIN: SIGNIFICANT CHANGE UP
-  AMIKACIN: SIGNIFICANT CHANGE UP
-  AMOXICILLIN/CLAVULANIC ACID: SIGNIFICANT CHANGE UP
-  AMOXICILLIN/CLAVULANIC ACID: SIGNIFICANT CHANGE UP
-  AMPICILLIN/SULBACTAM: SIGNIFICANT CHANGE UP
-  AMPICILLIN/SULBACTAM: SIGNIFICANT CHANGE UP
-  AMPICILLIN: SIGNIFICANT CHANGE UP
-  AZTREONAM: SIGNIFICANT CHANGE UP
-  AZTREONAM: SIGNIFICANT CHANGE UP
-  CEFAZOLIN: SIGNIFICANT CHANGE UP
-  CEFAZOLIN: SIGNIFICANT CHANGE UP
-  CEFEPIME: SIGNIFICANT CHANGE UP
-  CEFEPIME: SIGNIFICANT CHANGE UP
-  CEFOXITIN: SIGNIFICANT CHANGE UP
-  CEFOXITIN: SIGNIFICANT CHANGE UP
-  CEFTRIAXONE: SIGNIFICANT CHANGE UP
-  CEFTRIAXONE: SIGNIFICANT CHANGE UP
-  CIPROFLOXACIN: SIGNIFICANT CHANGE UP
-  CIPROFLOXACIN: SIGNIFICANT CHANGE UP
-  ERTAPENEM: SIGNIFICANT CHANGE UP
-  ERTAPENEM: SIGNIFICANT CHANGE UP
-  GENTAMICIN: SIGNIFICANT CHANGE UP
-  GENTAMICIN: SIGNIFICANT CHANGE UP
-  IMIPENEM: SIGNIFICANT CHANGE UP
-  IMIPENEM: SIGNIFICANT CHANGE UP
-  LEVOFLOXACIN: SIGNIFICANT CHANGE UP
-  LEVOFLOXACIN: SIGNIFICANT CHANGE UP
-  MEROPENEM: SIGNIFICANT CHANGE UP
-  MEROPENEM: SIGNIFICANT CHANGE UP
-  PIPERACILLIN/TAZOBACTAM: SIGNIFICANT CHANGE UP
-  PIPERACILLIN/TAZOBACTAM: SIGNIFICANT CHANGE UP
-  TETRACYCLINE: SIGNIFICANT CHANGE UP
-  TOBRAMYCIN: SIGNIFICANT CHANGE UP
-  TOBRAMYCIN: SIGNIFICANT CHANGE UP
-  TRIMETHOPRIM/SULFAMETHOXAZOLE: SIGNIFICANT CHANGE UP
-  TRIMETHOPRIM/SULFAMETHOXAZOLE: SIGNIFICANT CHANGE UP
-  VANCOMYCIN: SIGNIFICANT CHANGE UP
CULTURE RESULTS: SIGNIFICANT CHANGE UP
GLUCOSE BLDC GLUCOMTR-MCNC: 154 MG/DL — HIGH (ref 70–99)
GLUCOSE BLDC GLUCOMTR-MCNC: 157 MG/DL — HIGH (ref 70–99)
GLUCOSE BLDC GLUCOMTR-MCNC: 203 MG/DL — HIGH (ref 70–99)
GLUCOSE BLDC GLUCOMTR-MCNC: 214 MG/DL — HIGH (ref 70–99)
GLUCOSE BLDC GLUCOMTR-MCNC: 234 MG/DL — HIGH (ref 70–99)
METHOD TYPE: SIGNIFICANT CHANGE UP
ORGANISM # SPEC MICROSCOPIC CNT: SIGNIFICANT CHANGE UP
SPECIMEN SOURCE: SIGNIFICANT CHANGE UP

## 2021-11-20 PROCEDURE — 93306 TTE W/DOPPLER COMPLETE: CPT | Mod: 26

## 2021-11-20 PROCEDURE — 99222 1ST HOSP IP/OBS MODERATE 55: CPT | Mod: GC

## 2021-11-20 PROCEDURE — 99232 SBSQ HOSP IP/OBS MODERATE 35: CPT

## 2021-11-20 RX ORDER — ATORVASTATIN CALCIUM 80 MG/1
20 TABLET, FILM COATED ORAL AT BEDTIME
Refills: 0 | Status: DISCONTINUED | OUTPATIENT
Start: 2021-11-20 | End: 2021-11-24

## 2021-11-20 RX ORDER — INSULIN LISPRO 100/ML
5 VIAL (ML) SUBCUTANEOUS
Refills: 0 | Status: DISCONTINUED | OUTPATIENT
Start: 2021-11-20 | End: 2021-11-24

## 2021-11-20 RX ORDER — INSULIN LISPRO 100/ML
VIAL (ML) SUBCUTANEOUS
Refills: 0 | Status: DISCONTINUED | OUTPATIENT
Start: 2021-11-20 | End: 2021-11-24

## 2021-11-20 RX ADMIN — Medication 1 TABLET(S): at 12:30

## 2021-11-20 RX ADMIN — CEFEPIME 100 MILLIGRAM(S): 1 INJECTION, POWDER, FOR SOLUTION INTRAMUSCULAR; INTRAVENOUS at 08:15

## 2021-11-20 RX ADMIN — Medication 4: at 12:30

## 2021-11-20 RX ADMIN — Medication 1 TABLET(S): at 17:16

## 2021-11-20 RX ADMIN — Medication 1 TABLET(S): at 08:12

## 2021-11-20 RX ADMIN — INSULIN GLARGINE 15 UNIT(S): 100 INJECTION, SOLUTION SUBCUTANEOUS at 00:30

## 2021-11-20 RX ADMIN — ATORVASTATIN CALCIUM 20 MILLIGRAM(S): 80 TABLET, FILM COATED ORAL at 22:06

## 2021-11-20 RX ADMIN — Medication 1: at 17:17

## 2021-11-20 RX ADMIN — Medication 2: at 09:20

## 2021-11-20 RX ADMIN — INSULIN GLARGINE 15 UNIT(S): 100 INJECTION, SOLUTION SUBCUTANEOUS at 22:06

## 2021-11-20 RX ADMIN — ENOXAPARIN SODIUM 40 MILLIGRAM(S): 100 INJECTION SUBCUTANEOUS at 22:06

## 2021-11-20 RX ADMIN — CEFEPIME 100 MILLIGRAM(S): 1 INJECTION, POWDER, FOR SOLUTION INTRAMUSCULAR; INTRAVENOUS at 17:17

## 2021-11-20 RX ADMIN — Medication 5 UNIT(S): at 17:17

## 2021-11-20 RX ADMIN — VALSARTAN 80 MILLIGRAM(S): 80 TABLET ORAL at 08:16

## 2021-11-20 NOTE — PROGRESS NOTE ADULT - ASSESSMENT
Call made to [facility] to follow up. Spoke with pharmacist [facility], who states medication was approved 8/25/2021 at no cost for patient.    Will forward to prior auth team so they can close the authorization.   52yo Female w/ MHx of HTN and Type 2 DM, who was bit on her left forearm by her neighbors dog (Vonda) on 11/12, she went to OSH was given tetanus shot and discharged on PO Doxycyline and topical Bacitracin    #Cellulitis left forearm -2/2 dog bite   s/p I&D   plastics : following   dressing change 3 times / day   c/w IV abx   ID following   clinically improving : afebrile now     #Sinus tachycardia  -HR improved    #HTN  on valsartan   controlled     DM-2 :  -hold oral meds   seen by endo   c/w lantus / FSBS     dvt ppx     radha pitts MD

## 2021-11-20 NOTE — PROGRESS NOTE ADULT - ASSESSMENT
51 y.o F w/ a hx of DM that was admitted for worsening cellulitis and infection secondary to a dog bite to the [facility]    Plan:  - Continue drainage with gauze and pressing/expressing 3x a day  - pack wound with moistened gauze and wrap with kerlix.   - f/u blood cultures  - IV abx - ID recs Cefepime   - care per primary team      [id number] PRS

## 2021-11-20 NOTE — PROGRESS NOTE ADULT - SUBJECTIVE AND OBJECTIVE BOX
Patient is a 51y old  Female who presents with a chief complaint of Arm swelling due to dog bite (20 Nov 2021 16:40)      INTERVAL HPI/OVERNIGHT EVENTS: improving pain and erythema left forearm     T(C): 36.8 (11-20-21 @ 21:19), Max: 37 (11-20-21 @ 17:31)  HR: 83 (11-20-21 @ 21:19) (77 - 90)  BP: 120/68 (11-20-21 @ 21:19) (111/67 - 120/68)  RR: 18 (11-20-21 @ 21:19) (18 - 20)  SpO2: 100% (11-20-21 @ 21:19) (100% - 100%)  Wt(kg): --  I&O's Summary      PAST MEDICAL & SURGICAL HISTORY:  HTN (hypertension)    Type 2 diabetes mellitus    No significant past surgical history        SOCIAL HISTORY  Alcohol:  Tobacco:  Illicit substance use:    FAMILY HISTORY:    REVIEW OF SYSTEMS:  CONSTITUTIONAL: No fever, weight loss, or fatigue  EYES: No eye pain, visual disturbances, or discharge  ENMT:  No difficulty hearing, tinnitus, vertigo; No sinus or throat pain  NECK: No pain or stiffness  RESPIRATORY: No cough, wheezing, chills or hemoptysis; No shortness of breath  CARDIOVASCULAR: No chest pain, palpitations, dizziness, or leg swelling  GASTROINTESTINAL: No abdominal or epigastric pain. No nausea, vomiting, or hematemesis; No diarrhea or constipation. No melena or hematochezia.  GENITOURINARY: No dysuria, frequency, hematuria, or incontinence  NEUROLOGICAL: No headaches, memory loss, loss of strength, numbness, or tremors  SKIN: No itching, burning, rashes, or lesions   LYMPH NODES: No enlarged glands  ENDOCRINE: No heat or cold intolerance; No hair loss  MUSCULOSKELETAL: No joint pain or swelling; No muscle, back, or extremity pain  PSYCHIATRIC: No depression, anxiety, mood swings, or difficulty sleeping  HEME/LYMPH: No easy bruising, or bleeding gums  ALLERY AND IMMUNOLOGIC: No hives or eczema    RADIOLOGY & ADDITIONAL TESTS:    Imaging Personally Reviewed:  [ ] YES  [ ] NO    Consultant(s) Notes Reviewed:  [ ] YES  [ ] NO    PHYSICAL EXAM:  GENERAL: NAD, well-groomed, well-developed  HEAD:  Atraumatic, Normocephalic  EYES: EOMI, PERRLA, conjunctiva and sclera clear  ENMT: No tonsillar erythema, exudates, or enlargement; Moist mucous membranes, Good dentition, No lesions  NECK: Supple, No JVD, Normal thyroid  NERVOUS SYSTEM:  Alert & Oriented X3, Good concentration; Motor Strength 5/5 B/L upper and lower extremities; DTRs 2+ intact and symmetric  CHEST/LUNG: Clear to percussion bilaterally; No rales, rhonchi, wheezing, or rubs  HEART: Regular rate and rhythm; No murmurs, rubs, or gallops  ABDOMEN: Soft, Nontender, Nondistended; Bowel sounds present  EXTREMITIES:  2+ Peripheral Pulses, No clubbing, cyanosis, or edema  LYMPH: No lymphadenopathy noted  SKIN: No rashes or lesions    LABS:                        11.3   6.93  )-----------( 283      ( 19 Nov 2021 07:07 )             36.0     11-19    136  |  100  |  11  ----------------------------<  199<H>  3.8   |  27  |  0.70    Ca    9.0      19 Nov 2021 07:07  Mg     1.90     11-19    TPro  7.0  /  Alb  3.5  /  TBili  0.5  /  DBili  x   /  AST  11  /  ALT  16  /  AlkPhos  95  11-19        CAPILLARY BLOOD GLUCOSE      POCT Blood Glucose.: 203 mg/dL (20 Nov 2021 21:17)  POCT Blood Glucose.: 154 mg/dL (20 Nov 2021 17:05)  POCT Blood Glucose.: 214 mg/dL (20 Nov 2021 11:59)  POCT Blood Glucose.: 157 mg/dL (20 Nov 2021 08:31)  POCT Blood Glucose.: 234 mg/dL (20 Nov 2021 00:01)            MEDICATIONS  (STANDING):  atorvastatin 20 milliGRAM(s) Oral at bedtime  cefepime   IVPB      cefepime   IVPB 2000 milliGRAM(s) IV Intermittent every 12 hours  dextrose 40% Gel 15 Gram(s) Oral once  dextrose 5%. 1000 milliLiter(s) (50 mL/Hr) IV Continuous <Continuous>  dextrose 5%. 1000 milliLiter(s) (100 mL/Hr) IV Continuous <Continuous>  dextrose 50% Injectable 25 Gram(s) IV Push once  dextrose 50% Injectable 12.5 Gram(s) IV Push once  dextrose 50% Injectable 25 Gram(s) IV Push once  enoxaparin Injectable 40 milliGRAM(s) SubCutaneous at bedtime  glucagon  Injectable 1 milliGRAM(s) IntraMuscular once  insulin glargine Injectable (LANTUS) 15 Unit(s) SubCutaneous at bedtime  insulin lispro (ADMELOG) corrective regimen sliding scale   SubCutaneous at bedtime  insulin lispro (ADMELOG) corrective regimen sliding scale   SubCutaneous three times a day before meals  insulin lispro Injectable (ADMELOG) 5 Unit(s) SubCutaneous three times a day before meals  lactobacillus acidophilus 1 Tablet(s) Oral three times a day with meals  sodium chloride 0.9%. 1000 milliLiter(s) (100 mL/Hr) IV Continuous <Continuous>  valsartan 80 milliGRAM(s) Oral daily    MEDICATIONS  (PRN):  acetaminophen     Tablet .. 650 milliGRAM(s) Oral every 6 hours PRN Temp greater or equal to 38C (100.4F), Mild Pain (1 - 3), Moderate Pain (4 - 6)  HYDROmorphone  Injectable 0.5 milliGRAM(s) IV Push every 3 hours PRN Moderate Pain (4 - 6)  melatonin 3 milliGRAM(s) Oral at bedtime PRN Insomnia      Care Discussed with Consultants/Other Providers [ ] YES  [ ] NO

## 2021-11-20 NOTE — CONSULT NOTE ADULT - CONSULT REASON
cards eval, sinus tach
left forearm cellulitis/abscess
Left arm cellulitis and tendosynovitis
T2DM uncontrolled

## 2021-11-20 NOTE — PROGRESS NOTE ADULT - SUBJECTIVE AND OBJECTIVE BOX
AVERY GNIO  [id number]    Subjective:    Patient seen and examined, dressing and packing changed this morning.   Afebrile, overnight, states L arm swelling improved.        Objective:  T(C): 36.8 (11-20-21 @ 10:03), Max: 37.2 (11-19-21 @ 12:15)  HR: 90 (11-20-21 @ 10:03) (77 - 90)  BP: 112/68 (11-20-21 @ 10:03) (110/72 - 118/77)  RR: 19 (11-20-21 @ 10:03) (18 - 20)  SpO2: 100% (11-20-21 @ 10:03) (98% - 100%)  Wt(kg): --   11-19    136  |  100  |  11  ----------------------------<  199<H>  3.8   |  27  |  0.70    Ca    9.0      19 Nov 2021 07:07  Mg     1.90     11-19    TPro  7.0  /  Alb  3.5  /  TBili  0.5  /  DBili  x   /  AST  11  /  ALT  16  /  AlkPhos  95  11-19                        11.3   6.93  )-----------( 283      ( 19 Nov 2021 07:07 )             36.0       PHYSICAL EXAM:    General: Alert, NAD  Respiratory: non-labored breathing  Extremities: LUE serous draining from distal wound site, none from proximal wound site. Erythema and edema extending to elbow and distally to forearm stable. Forearm indurated, no fluctuance.             MEDICATIONS  (STANDING):  cefepime   IVPB      cefepime   IVPB 2000 milliGRAM(s) IV Intermittent every 12 hours  dextrose 40% Gel 15 Gram(s) Oral once  dextrose 5%. 1000 milliLiter(s) (50 mL/Hr) IV Continuous <Continuous>  dextrose 5%. 1000 milliLiter(s) (100 mL/Hr) IV Continuous <Continuous>  dextrose 50% Injectable 25 Gram(s) IV Push once  dextrose 50% Injectable 12.5 Gram(s) IV Push once  dextrose 50% Injectable 25 Gram(s) IV Push once  enoxaparin Injectable 40 milliGRAM(s) SubCutaneous at bedtime  glucagon  Injectable 1 milliGRAM(s) IntraMuscular once  insulin glargine Injectable (LANTUS) 15 Unit(s) SubCutaneous at bedtime  insulin lispro (ADMELOG) corrective regimen sliding scale   SubCutaneous three times a day before meals  insulin lispro (ADMELOG) corrective regimen sliding scale   SubCutaneous at bedtime  lactobacillus acidophilus 1 Tablet(s) Oral three times a day with meals  sodium chloride 0.9%. 1000 milliLiter(s) (100 mL/Hr) IV Continuous <Continuous>  valsartan 80 milliGRAM(s) Oral daily    MEDICATIONS  (PRN):  acetaminophen     Tablet .. 650 milliGRAM(s) Oral every 6 hours PRN Temp greater or equal to 38C (100.4F), Mild Pain (1 - 3), Moderate Pain (4 - 6)  HYDROmorphone  Injectable 0.5 milliGRAM(s) IV Push every 3 hours PRN Moderate Pain (4 - 6)  melatonin 3 milliGRAM(s) Oral at bedtime PRN Insomnia

## 2021-11-20 NOTE — PROGRESS NOTE ADULT - ASSESSMENT
a/p  50yo Female w/ MHx of HTN and Type 2 DM, who was bit on her left forearm by her neighbors dog (Vonda) on 11/12, she went to OSH was given tetanus shot and discharged on PO Doxycyline and topical Bacitracin    #Sinus tachycardia  -HR improved, asymptomatic   -likely infectious driven  -check echo to rule out LV or valvular dysfunction -  can be done as outpt     #Cellulitis   -2/2 dog bite   -CT UE noted  -s/p I&D  -wound culture noted   -iv abx per primary team   -ID eval  -plastics / med f/u    #HTN  -sbp stable  -hold norvasc   -cont lower dose ARB 80 mg QD   -cont to monitor     dvt ppx     35 minutes spent on total encounter; more than 50% of the visit was spent counseling and/or coordinating care by the attending physician.

## 2021-11-20 NOTE — CONSULT NOTE ADULT - ATTENDING COMMENTS
51 years old female with PMHx of HTN and Type 2 DM, who was bit on her left forearm by her neighbors dog (Vonda) on 11/12, presented with worseing cellulitis    Unprovoked bite, owner is unclear about dog's vaccination status  Animal control is following  Received tetanus and PO doxy at [facility], but cellulitis progressed   CT left arm showed fluid collection within tendon sheath, consistent with tenosynovitis of common extensor tendon; 2 foreign bodies, but no subcutaneous abscess  Abscess Cx 11/18 E. coli, Klebsiella and E. avium    Antibiotic:  Cipro 11/17  Clindamycin 11/17 -   Levaquin 11/18 -     No history of splenectomy  No dysphagia, hydrophobia; no clinical symptoms of rabies  No clinical signs of septic arthritis of left elbow  No clinical signs of compartment syndrome  Having difficulty extending her wrist and her fingers  This could progress to limb threatening infection so will need close follow up from surgery  Will change to cefepime to cover for GNR; E. avium has low virulence so will not cover for it at this point      IMPRESSION:  Left forearm tenosynovitis and myositis  Foreign body in left arm  positive culture finding  cellulitis       RECOMMENDATIONS:  - D/C Levaquin and clindamycin  - Start IV cefepime 2gm q12hrs (aware of penicillin allergy; monitor for allergy reaction while on cefepime)  - Check 2 sets of blood culture  - Follow up on sensitivities of abscess culture  - Plastic follow up  - c/w wound care   - glycemic control for optimal wound healing     Joan Mccurdy  Pager: [phone]. If no response or past 5 pm call [phone].    Please call ID service for questions over weekend at [phone].
Poorly controlled T2DM. Need for long-term control, risk of end-organ damage, disc in detail. Agree with in-pt insulin adjustments.

## 2021-11-20 NOTE — CONSULT NOTE ADULT - SUBJECTIVE AND OBJECTIVE BOX
HPI:  50yo woman with Type 2 DM uncontrolled (HbA1C 9.1) on orals c/b neuropathy. Also with hx of HTN. Admitted for cellulitis after being bitten by dog on left forearm on 11/12. Endocrine consulted for T2DM mgmt.    T2DM for >10 years. Follows with Endocrinologist, Dr. Abigail Hoffman in [place]. Takes Trulicity 1.5 mg sq weekly, farxiga 5 mg daily, and glimeperide 2 mg daily. Has been on all medications for at least one year. Reports adherence to medications. States she tried metformin in the past had GI side effects. Checks FS every other day and levels range 100-160, occasionally 200. Denies hypoglycemia. Reports eating diet high in carbs (pasta, ice cream). Minimal exercise.   +PO intake.        PAST MEDICAL & SURGICAL HISTORY:  HTN (hypertension)    Type 2 diabetes mellitus    No significant past surgical history        FAMILY HISTORY:  FH: type 2 diabetes in grandfather         Social History:  Denies tobacco use   Denies ETOH use      Outpatient Medications: Home Medications:  amLODIPine 5 mg oral tablet: 1 tab(s) orally once a day (18 Nov 2021 15:13)  bacitracin 500 units/g topical ointment: Apply topically to affected area 2 times a day (18 Nov 2021 15:13)  Farxiga 5 mg oral tablet: 1 tab(s) orally once a day (18 Nov 2021 15:13)  glimepiride 2 mg oral tablet: 1 tab(s) orally once a day (18 Nov 2021 15:13)  Trulicity Pen 1.5 mg/0.5 mL subcutaneous solution: 1 dose(s) subcutaneous once a week on saturday (18 Nov 2021 15:13)  Tylenol 500 mg oral tablet: 1 tab(s) orally every 6 hours, As Needed (18 Nov 2021 15:13)  valsartan 160 mg oral capsule: 1 cap(s) orally once a day (18 Nov 2021 15:13)      MEDICATIONS  (STANDING):  cefepime   IVPB      cefepime   IVPB 2000 milliGRAM(s) IV Intermittent every 12 hours  dextrose 40% Gel 15 Gram(s) Oral once  dextrose 5%. 1000 milliLiter(s) (50 mL/Hr) IV Continuous <Continuous>  dextrose 5%. 1000 milliLiter(s) (100 mL/Hr) IV Continuous <Continuous>  dextrose 50% Injectable 25 Gram(s) IV Push once  dextrose 50% Injectable 12.5 Gram(s) IV Push once  dextrose 50% Injectable 25 Gram(s) IV Push once  enoxaparin Injectable 40 milliGRAM(s) SubCutaneous at bedtime  glucagon  Injectable 1 milliGRAM(s) IntraMuscular once  insulin glargine Injectable (LANTUS) 15 Unit(s) SubCutaneous at bedtime  insulin lispro (ADMELOG) corrective regimen sliding scale   SubCutaneous at bedtime  insulin lispro (ADMELOG) corrective regimen sliding scale   SubCutaneous three times a day before meals  insulin lispro Injectable (ADMELOG) 5 Unit(s) SubCutaneous three times a day before meals  lactobacillus acidophilus 1 Tablet(s) Oral three times a day with meals  sodium chloride 0.9%. 1000 milliLiter(s) (100 mL/Hr) IV Continuous <Continuous>  valsartan 80 milliGRAM(s) Oral daily    MEDICATIONS  (PRN):  acetaminophen     Tablet .. 650 milliGRAM(s) Oral every 6 hours PRN Temp greater or equal to 38C (100.4F), Mild Pain (1 - 3), Moderate Pain (4 - 6)  HYDROmorphone  Injectable 0.5 milliGRAM(s) IV Push every 3 hours PRN Moderate Pain (4 - 6)  melatonin 3 milliGRAM(s) Oral at bedtime PRN Insomnia      Allergies    penicillins (Hives; Urticaria)    Intolerances      Review of Systems:  Constitutional: No fever, chills   Neuro: No tremors, headache   Cardiovascular: No chest pain, palpitations  Respiratory: No SOB, no cough  GI: No nausea, vomiting, abdominal pain  : No dysuria, polyuria   Skin: +cellulitis on left forearm   Psych: no depression, anxiety   Endocrine: no polyphagia, polydipsia     ALL OTHER SYSTEMS REVIEWED AND NEGATIVE        PHYSICAL EXAM:  VITALS: T(C): 36.8 (11-20-21 @ 10:03)  T(F): 98.3 (11-20-21 @ 10:03), Max: 98.4 (11-20-21 @ 05:28)  HR: 90 (11-20-21 @ 10:03) (77 - 90)  BP: 112/68 (11-20-21 @ 10:03) (112/68 - 118/77)  RR:  (19 - 20)  SpO2:  (100% - 100%)  Wt(kg): --  GENERAL: NAD, well-groomed  EYES: No proptosis, anicteric  HEENT:  Atraumatic, Normocephalic, moist mucous membranes  RESPIRATORY: no respiratory distress; no accessory muscle use  GI: Soft, nontender, non distended  SKIN: Dry, intact, No rashes or lesions  NEURO: AOx3, moves all extremities spontaneously   PSYCH: Reactive affect, euthymic mood    POCT Blood Glucose.: 214 mg/dL (11-20-21 @ 11:59)  POCT Blood Glucose.: 157 mg/dL (11-20-21 @ 08:31)  POCT Blood Glucose.: 234 mg/dL (11-20-21 @ 00:01)  POCT Blood Glucose.: 217 mg/dL (11-19-21 @ 21:07)  POCT Blood Glucose.: 208 mg/dL (11-19-21 @ 17:28)  POCT Blood Glucose.: 214 mg/dL (11-19-21 @ 11:52)  POCT Blood Glucose.: 181 mg/dL (11-19-21 @ 08:14)  POCT Blood Glucose.: 173 mg/dL (11-18-21 @ 22:38)  POCT Blood Glucose.: 210 mg/dL (11-18-21 @ 15:57)  POCT Blood Glucose.: 180 mg/dL (11-18-21 @ 13:26)  POCT Blood Glucose.: 256 mg/dL (11-18-21 @ 09:13)  POCT Blood Glucose.: 206 mg/dL (11-18-21 @ 08:06)  POCT Blood Glucose.: 243 mg/dL (11-18-21 @ 00:38)                            11.3   6.93  )-----------( 283      ( 19 Nov 2021 07:07 )             36.0       11-19    136  |  100  |  11  ----------------------------<  199<H>  3.8   |  27  |  0.70    EGFR if : 115  EGFR if non : 99    Ca    9.0      11-19  Mg     1.90     11-19  Phos  4.0     11-18    TPro  7.0  /  Alb  3.5  /  TBili  0.5  /  DBili  x   /  AST  11 /  ALT  16  /  AlkPhos  95  11-19      Thyroid Function Tests:  11-18 @ 07:10 TSH 1.27 FreeT4 -- T3 -- Anti TPO -- Anti Thyroglobulin Ab -- TSI --      11-18 Chol 148 Direct LDL -- LDL calculated 98 HDL 33<L> Trig 85        Radiology:                HPI:  50yo woman with Type 2 DM uncontrolled (HbA1C 9.1) on orals c/b neuropathy. Also with hx of HTN. Admitted for cellulitis after being bitten by dog on left forearm on 11/12. Endocrine consulted for T2DM mgmt.    T2DM for >10 years. Follows with Endocrinologist, Dr. Abigail Hoffman in [place]. Takes Trulicity 1.5 mg sq weekly, farxiga 5 mg daily, and glimeperide 2 mg daily. Has been on all medications for at least one year. Reports adherence to medications. States she tried metformin in the past had GI side effects. Checks FS every other day and levels range 100-160, occasionally 200. Denies hypoglycemia. Reports eating diet high in carbs (pasta, ice cream). Minimal exercise.   +PO intake.        PAST MEDICAL & SURGICAL HISTORY:  HTN (hypertension)    Type 2 diabetes mellitus    No significant past surgical history        FAMILY HISTORY:  FH: type 2 diabetes in grandfather         Social History:  Denies tobacco use   Denies ETOH use      Outpatient Medications: Home Medications:  amLODIPine 5 mg oral tablet: 1 tab(s) orally once a day (18 Nov 2021 15:13)  bacitracin 500 units/g topical ointment: Apply topically to affected area 2 times a day (18 Nov 2021 15:13)  Farxiga 5 mg oral tablet: 1 tab(s) orally once a day (18 Nov 2021 15:13)  glimepiride 2 mg oral tablet: 1 tab(s) orally once a day (18 Nov 2021 15:13)  Trulicity Pen 1.5 mg/0.5 mL subcutaneous solution: 1 dose(s) subcutaneous once a week on saturday (18 Nov 2021 15:13)  Tylenol 500 mg oral tablet: 1 tab(s) orally every 6 hours, As Needed (18 Nov 2021 15:13)  valsartan 160 mg oral capsule: 1 cap(s) orally once a day (18 Nov 2021 15:13)      MEDICATIONS  (STANDING):  cefepime   IVPB      cefepime   IVPB 2000 milliGRAM(s) IV Intermittent every 12 hours  dextrose 40% Gel 15 Gram(s) Oral once  dextrose 5%. 1000 milliLiter(s) (50 mL/Hr) IV Continuous <Continuous>  dextrose 5%. 1000 milliLiter(s) (100 mL/Hr) IV Continuous <Continuous>  dextrose 50% Injectable 25 Gram(s) IV Push once  dextrose 50% Injectable 12.5 Gram(s) IV Push once  dextrose 50% Injectable 25 Gram(s) IV Push once  enoxaparin Injectable 40 milliGRAM(s) SubCutaneous at bedtime  glucagon  Injectable 1 milliGRAM(s) IntraMuscular once  insulin glargine Injectable (LANTUS) 15 Unit(s) SubCutaneous at bedtime  insulin lispro (ADMELOG) corrective regimen sliding scale   SubCutaneous at bedtime  insulin lispro (ADMELOG) corrective regimen sliding scale   SubCutaneous three times a day before meals  insulin lispro Injectable (ADMELOG) 5 Unit(s) SubCutaneous three times a day before meals  lactobacillus acidophilus 1 Tablet(s) Oral three times a day with meals  sodium chloride 0.9%. 1000 milliLiter(s) (100 mL/Hr) IV Continuous <Continuous>  valsartan 80 milliGRAM(s) Oral daily    MEDICATIONS  (PRN):  acetaminophen     Tablet .. 650 milliGRAM(s) Oral every 6 hours PRN Temp greater or equal to 38C (100.4F), Mild Pain (1 - 3), Moderate Pain (4 - 6)  HYDROmorphone  Injectable 0.5 milliGRAM(s) IV Push every 3 hours PRN Moderate Pain (4 - 6)  melatonin 3 milliGRAM(s) Oral at bedtime PRN Insomnia      Allergies    penicillins (Hives; Urticaria)    Intolerances      Review of Systems:  Constitutional: No fever, chills   Neuro: No tremors, headache   Cardiovascular: No chest pain, palpitations  Respiratory: No SOB, no cough  GI: No nausea, vomiting, abdominal pain  : No dysuria, polyuria   Skin: +cellulitis on left forearm   Psych: no depression, anxiety   Endocrine: no polyphagia, polydipsia     ALL OTHER SYSTEMS REVIEWED AND NEGATIVE        PHYSICAL EXAM:  VITALS: T(C): 36.8 (11-20-21 @ 10:03)  T(F): 98.3 (11-20-21 @ 10:03), Max: 98.4 (11-20-21 @ 05:28)  HR: 90 (11-20-21 @ 10:03) (77 - 90)  BP: 112/68 (11-20-21 @ 10:03) (112/68 - 118/77)  RR:  (19 - 20)  SpO2:  (100% - 100%)  Wt(kg): --  GENERAL: NAD, well-groomed  EYES: No proptosis, anicteric  HEENT:  Atraumatic, Normocephalic, moist mucous membranes  RESPIRATORY: no respiratory distress; no accessory muscle use  GI: Soft, nontender, non distended  SKIN: Dry, intact, No rashes or lesions  MSK: left forearm dressed  NEURO: AOx3  PSYCH: Reactive affect, euthymic mood    POCT Blood Glucose.: 214 mg/dL (11-20-21 @ 11:59)  POCT Blood Glucose.: 157 mg/dL (11-20-21 @ 08:31)  POCT Blood Glucose.: 234 mg/dL (11-20-21 @ 00:01)  POCT Blood Glucose.: 217 mg/dL (11-19-21 @ 21:07)  POCT Blood Glucose.: 208 mg/dL (11-19-21 @ 17:28)  POCT Blood Glucose.: 214 mg/dL (11-19-21 @ 11:52)  POCT Blood Glucose.: 181 mg/dL (11-19-21 @ 08:14)  POCT Blood Glucose.: 173 mg/dL (11-18-21 @ 22:38)  POCT Blood Glucose.: 210 mg/dL (11-18-21 @ 15:57)  POCT Blood Glucose.: 180 mg/dL (11-18-21 @ 13:26)  POCT Blood Glucose.: 256 mg/dL (11-18-21 @ 09:13)  POCT Blood Glucose.: 206 mg/dL (11-18-21 @ 08:06)  POCT Blood Glucose.: 243 mg/dL (11-18-21 @ 00:38)                            11.3   6.93  )-----------( 283      ( 19 Nov 2021 07:07 )             36.0       11-19    136  |  100  |  11  ----------------------------<  199<H>  3.8   |  27  |  0.70    EGFR if : 115  EGFR if non : 99    Ca    9.0      11-19  Mg     1.90     11-19  Phos  4.0     11-18    TPro  7.0  /  Alb  3.5  /  TBili  0.5  /  DBili  x   /  AST  11  /  ALT  16  /  AlkPhos  95  11-19      Thyroid Function Tests:  11-18 @ 07:10 TSH 1.27 FreeT4 -- T3 -- Anti TPO -- Anti Thyroglobulin Ab -- TSI --      11-18 Chol 148 Direct LDL -- LDL calculated 98 HDL 33<L> Trig 85        Radiology:

## 2021-11-20 NOTE — PROGRESS NOTE ADULT - SUBJECTIVE AND OBJECTIVE BOX
CC: Patient is a 51y old  Female who presents with a chief complaint of Arm swelling due to dog bite (20 Nov 2021 11:08)    ID following for L forearm tenosynovitis s/p dog bite injury    Interval History/ROS: Patient with improvement in erythema and swelling. No fevers. no chills.    Rest of ROS negative.    Allergies  penicillins (Hives; Urticaria)    ANTIMICROBIALS:  cefepime   IVPB    cefepime   IVPB 2000 every 12 hours    OTHER MEDS:  acetaminophen     Tablet .. 650 milliGRAM(s) Oral every 6 hours PRN  dextrose 40% Gel 15 Gram(s) Oral once  dextrose 5%. 1000 milliLiter(s) IV Continuous <Continuous>  dextrose 5%. 1000 milliLiter(s) IV Continuous <Continuous>  dextrose 50% Injectable 25 Gram(s) IV Push once  dextrose 50% Injectable 12.5 Gram(s) IV Push once  dextrose 50% Injectable 25 Gram(s) IV Push once  enoxaparin Injectable 40 milliGRAM(s) SubCutaneous at bedtime  glucagon  Injectable 1 milliGRAM(s) IntraMuscular once  HYDROmorphone  Injectable 0.5 milliGRAM(s) IV Push every 3 hours PRN  insulin glargine Injectable (LANTUS) 15 Unit(s) SubCutaneous at bedtime  insulin lispro (ADMELOG) corrective regimen sliding scale   SubCutaneous three times a day before meals  insulin lispro (ADMELOG) corrective regimen sliding scale   SubCutaneous at bedtime  lactobacillus acidophilus 1 Tablet(s) Oral three times a day with meals  melatonin 3 milliGRAM(s) Oral at bedtime PRN  sodium chloride 0.9%. 1000 milliLiter(s) IV Continuous <Continuous>  valsartan 80 milliGRAM(s) Oral daily    PE:    Vital Signs Last 24 Hrs  T(C): 36.8 (20 Nov 2021 10:03), Max: 37.2 (19 Nov 2021 12:15)  T(F): 98.3 (20 Nov 2021 10:03), Max: 98.9 (19 Nov 2021 12:15)  HR: 90 (20 Nov 2021 10:03) (77 - 90)  BP: 112/68 (20 Nov 2021 10:03) (110/72 - 118/77)  BP(mean): --  RR: 19 (20 Nov 2021 10:03) (18 - 20)  SpO2: 100% (20 Nov 2021 10:03) (98% - 100%)    Gen: AOx3, NAD  CV: S1+S2 normal, no murmurs  Resp: Clear bilat, no resp distress  Abd: Soft, nontender, +BS  Ext: No LE edema, no wounds  : No Ramírez  IV/Skin: No thrombophlebitis, left forearm with multiple lacerations, puncture wounds x 2 packed, swelling improving. Erythema almost resolved.  Neuro: no focal deficits    LABS:                          11.3   6.93  )-----------( 283      ( 19 Nov 2021 07:07 )             36.0       11-19    136  |  100  |  11  ----------------------------<  199<H>  3.8   |  27  |  0.70    Ca    9.0      19 Nov 2021 07:07  Mg     1.90     11-19    TPro  7.0  /  Alb  3.5  /  TBili  0.5  /  DBili  x   /  AST  11  /  ALT  16  /  AlkPhos  95  11-19    MICROBIOLOGY:  v  .Abscess Left forerarm  11-18-21   Few Escherichia coli  Rare Klebsiella pneumoniae  Numerous Enterococcus avium  --  --    Rapid RVP Result: NotDetec (11-17 @ 20:00)    RADIOLOGY:    < from: CT Upper Extremity w/ IV Cont, Left (11.17.21 @ 18:01) >  IMPRESSION:    1.  Skin defect in the mid forearm soft tissues with subjacent confluent edema concerning for cellulitis. No rim-enhancing fluid collection within the subcutaneous soft tissues 2. Radiopaque subcentimeter flecks in the subcutaneous tissues at the proximal forearm approximately 5 cm from the lateral epicondyle overlying the common extensor musculature concerning for small foreign bodies.  2.  Fluid collection within the tendon sheathat the myotendinous junction of the common extensor tendon subjacent to a skin wound at the mid forearm concerning for tenosynovitis of infectious or inflammatory etiology  3.  Confluent edema without definitive rim enhancement in the musculature of the proximal common extensor muscle subjacent to two radiopaque foreign bodies concerning for early fluid collection versus myositis.    < end of copied text >

## 2021-11-20 NOTE — PROGRESS NOTE ADULT - SUBJECTIVE AND OBJECTIVE BOX
CARDIOLOGY FOLLOW UP NOTE - DR. HARDING    Patient Name: AVERY CHRISTIAN  Date of Service: 11-20-21     Patient seen and examined  no new events  c/o left arm pain    Subjective:    cv: denies chest pain, dyspnea, palpitations, dizziness  pulmonary: denies cough  GI: denies abdominal pain, nausea, vomiting  vascular/legs: no edema   skin: no rash  ROS: otherwise negative   overnight events:      PHYSICAL EXAM:  T(C): 36.8 (11-20-21 @ 10:03), Max: 36.9 (11-20-21 @ 05:28)  HR: 90 (11-20-21 @ 10:03) (77 - 90)  BP: 112/68 (11-20-21 @ 10:03) (110/72 - 118/77)  RR: 19 (11-20-21 @ 10:03) (19 - 20)  SpO2: 100% (11-20-21 @ 10:03) (100% - 100%)  Wt(kg): --  I&O's Summary    Daily     Daily     Appearance: Normal	  Cardiovascular: Normal S1 S2,RRR, No JVD, No murmurs  Respiratory: Lungs clear to auscultation	  Gastrointestinal:  Soft, Non-tender, + BS	  Extremities: Normal range of motion, No clubbing, cyanosis or edema      Home Medications:  amLODIPine 5 mg oral tablet: 1 tab(s) orally once a day (18 Nov 2021 15:13)  bacitracin 500 units/g topical ointment: Apply topically to affected area 2 times a day (18 Nov 2021 15:13)  Farxiga 5 mg oral tablet: 1 tab(s) orally once a day (18 Nov 2021 15:13)  glimepiride 2 mg oral tablet: 1 tab(s) orally once a day (18 Nov 2021 15:13)  Trulicity Pen 1.5 mg/0.5 mL subcutaneous solution: 1 dose(s) subcutaneous once a week on saturday (18 Nov 2021 15:13)  Tylenol 500 mg oral tablet: 1 tab(s) orally every 6 hours, As Needed (18 Nov 2021 15:13)  valsartan 160 mg oral capsule: 1 cap(s) orally once a day (18 Nov 2021 15:13)      MEDICATIONS  (STANDING):  cefepime   IVPB      cefepime   IVPB 2000 milliGRAM(s) IV Intermittent every 12 hours  dextrose 40% Gel 15 Gram(s) Oral once  dextrose 5%. 1000 milliLiter(s) (50 mL/Hr) IV Continuous <Continuous>  dextrose 5%. 1000 milliLiter(s) (100 mL/Hr) IV Continuous <Continuous>  dextrose 50% Injectable 25 Gram(s) IV Push once  dextrose 50% Injectable 12.5 Gram(s) IV Push once  dextrose 50% Injectable 25 Gram(s) IV Push once  enoxaparin Injectable 40 milliGRAM(s) SubCutaneous at bedtime  glucagon  Injectable 1 milliGRAM(s) IntraMuscular once  insulin glargine Injectable (LANTUS) 15 Unit(s) SubCutaneous at bedtime  insulin lispro (ADMELOG) corrective regimen sliding scale   SubCutaneous three times a day before meals  insulin lispro (ADMELOG) corrective regimen sliding scale   SubCutaneous at bedtime  lactobacillus acidophilus 1 Tablet(s) Oral three times a day with meals  sodium chloride 0.9%. 1000 milliLiter(s) (100 mL/Hr) IV Continuous <Continuous>  valsartan 80 milliGRAM(s) Oral daily      TELEMETRY: 	    ECG:  	  RADIOLOGY:   DIAGNOSTIC TESTING:  [ ] Echocardiogram:  [ ] Catheterization:  [ ] Stress Test:    OTHER: 	    LABS:	 	    CARDIAC MARKERS:                                      11.3   6.93  )-----------( 283      ( 19 Nov 2021 07:07 )             36.0     11-19    136  |  100  |  11  ----------------------------<  199<H>  3.8   |  27  |  0.70    Ca    9.0      19 Nov 2021 07:07  Mg     1.90     11-19    TPro  7.0  /  Alb  3.5  /  TBili  0.5  /  DBili  x   /  AST  11  /  ALT  16  /  AlkPhos  95  11-19    proBNP:     Lipid Profile:   HgA1c:     Creatinine, Serum: 0.70 mg/dL (11-19-21 @ 07:07)  Creatinine, Serum: 0.62 mg/dL (11-18-21 @ 07:10)  Creatinine, Serum: 0.64 mg/dL (11-17-21 @ 14:43)

## 2021-11-20 NOTE — PROGRESS NOTE ADULT - ASSESSMENT
51 years old female with PMHx of HTN and Type 2 DM, who was bit on her left forearm by her neighbors dog (Vonda) on 11/12, presented with worsening cellulitis    Unprovoked bite, owner is unclear about dog's vaccination status  Animal control is following, continue to monitor health of dog  Received tetanus and PO doxy at [facility], but cellulitis progressed   CT left arm showed fluid collection within tendon sheath, consistent with tenosynovitis of common extensor tendon; 2 foreign bodies, but no subcutaneous abscess  Abscess Cx 11/18 E. coli, Klebsiella and E. avium    Antibiotic:  Cipro 11/17  Clindamycin 11/17 - 11/19  Levaquin 11/18 - 11/19  Cefepime 11/19 ->    No history of splenectomy  No dysphagia, hydrophobia; no clinical symptoms of rabies  No clinical signs of septic arthritis of left elbow  No clinical signs of compartment syndrome  Having difficulty extending her wrist and her fingers  This could progress to limb threatening infection so will need close follow up from surgery  Will change to cefepime to cover for GNR; E. avium has low virulence so will not cover for it at this point      IMPRESSION:  Left forearm tenosynovitis and myositis  Foreign body in left arm  positive culture finding  cellulitis/ wound infection improving     RECOMMENDATIONS:  - Continue IV cefepime 2gm q12hrs (aware of penicillin allergy; continue to monitor, currently tolerating)  - F/U blood cultures in lab  - Follow up on sensitivities of abscess culture  - Plastic following  - c/w wound care   - glycemic control for optimal wound healing     Silverio Hinson MD  Pager [phone]  After 5pm/weekends call [phone]

## 2021-11-20 NOTE — CONSULT NOTE ADULT - ASSESSMENT
52yo woman with Type 2 DM uncontrolled (HbA1C 9.1) on orals c/b neuropathy. Also with hx of HTN. Admitted for cellulitis after being bitten by dog on left forearm on 11/12. Endocrine consulted for T2DM mgmt.    #T2DM uncontrolled c/b neuropathy  home regimen: Trulicity 1.5 mg sq weekly, farxiga 5 mg daily, glimeperide 2 mg daily    Inpatient plan:  - goal glucose 100-180  - c/w Lantus 15 units qhs  - add Admelog 5 units TID pre-meal  - change scales to low pre-meal correction scale and low HS correction scale  - check FS AC/HS  - carb consistent diet  - registered dietician eval  Discharge plan:  - Tentative plan to DC on increased dose of farxiga 10 mg daily, increased dose of Trulicity 3 mg weekly, home dose of glimeperide 2 mg daily. Pt had GI side effects with metformin in the past. Could consider trial of metformin ER and stopping glimeperide (would advise pt to discuss with outpatient endocrinologist). Outpatient f/u with Endocrinologist (Dr. Abigail Hoffman).    #HTN  goal BP<130/80  - continue ARB  - manage per primary team    #HLD  LDL 98 (goal <70)  - pt reports recently being started on atorvastatin 20 mg daily by cardiologist but hasn't been taking consistently  - recommend to start atorvastatin 20 mg daily       Sammi Howell DO, Endocrine Fellow   Pager [phone] from 9-5PM. After hours and on weekends please call [phone].

## 2021-11-21 LAB
GLUCOSE BLDC GLUCOMTR-MCNC: 124 MG/DL — HIGH (ref 70–99)
GLUCOSE BLDC GLUCOMTR-MCNC: 162 MG/DL — HIGH (ref 70–99)
GLUCOSE BLDC GLUCOMTR-MCNC: 212 MG/DL — HIGH (ref 70–99)
GLUCOSE BLDC GLUCOMTR-MCNC: 241 MG/DL — HIGH (ref 70–99)

## 2021-11-21 PROCEDURE — 99232 SBSQ HOSP IP/OBS MODERATE 35: CPT

## 2021-11-21 RX ORDER — METRONIDAZOLE 500 MG
500 TABLET ORAL ONCE
Refills: 0 | Status: COMPLETED | OUTPATIENT
Start: 2021-11-21 | End: 2021-11-21

## 2021-11-21 RX ORDER — METRONIDAZOLE 500 MG
TABLET ORAL
Refills: 0 | Status: DISCONTINUED | OUTPATIENT
Start: 2021-11-21 | End: 2021-11-24

## 2021-11-21 RX ORDER — CEFTRIAXONE 500 MG/1
1000 INJECTION, POWDER, FOR SOLUTION INTRAMUSCULAR; INTRAVENOUS EVERY 24 HOURS
Refills: 0 | Status: DISCONTINUED | OUTPATIENT
Start: 2021-11-21 | End: 2021-11-24

## 2021-11-21 RX ORDER — METRONIDAZOLE 500 MG
500 TABLET ORAL EVERY 8 HOURS
Refills: 0 | Status: DISCONTINUED | OUTPATIENT
Start: 2021-11-21 | End: 2021-11-24

## 2021-11-21 RX ADMIN — Medication 5 UNIT(S): at 08:10

## 2021-11-21 RX ADMIN — ATORVASTATIN CALCIUM 20 MILLIGRAM(S): 80 TABLET, FILM COATED ORAL at 22:22

## 2021-11-21 RX ADMIN — Medication 2: at 12:57

## 2021-11-21 RX ADMIN — CEFEPIME 100 MILLIGRAM(S): 1 INJECTION, POWDER, FOR SOLUTION INTRAMUSCULAR; INTRAVENOUS at 07:26

## 2021-11-21 RX ADMIN — Medication 5 UNIT(S): at 17:43

## 2021-11-21 RX ADMIN — Medication 1 TABLET(S): at 07:28

## 2021-11-21 RX ADMIN — VALSARTAN 80 MILLIGRAM(S): 80 TABLET ORAL at 07:28

## 2021-11-21 RX ADMIN — Medication 5 UNIT(S): at 12:53

## 2021-11-21 RX ADMIN — Medication 100 MILLIGRAM(S): at 22:22

## 2021-11-21 RX ADMIN — Medication 1 TABLET(S): at 12:54

## 2021-11-21 RX ADMIN — INSULIN GLARGINE 15 UNIT(S): 100 INJECTION, SOLUTION SUBCUTANEOUS at 22:23

## 2021-11-21 RX ADMIN — ENOXAPARIN SODIUM 40 MILLIGRAM(S): 100 INJECTION SUBCUTANEOUS at 22:22

## 2021-11-21 RX ADMIN — Medication 100 MILLIGRAM(S): at 12:53

## 2021-11-21 RX ADMIN — Medication 1: at 08:12

## 2021-11-21 RX ADMIN — Medication 1 TABLET(S): at 17:43

## 2021-11-21 NOTE — PROGRESS NOTE ADULT - SUBJECTIVE AND OBJECTIVE BOX
Patient is a 51y old  Female who presents with a chief complaint of Arm swelling due to dog bite (21 Nov 2021 14:11)      INTERVAL HPI/OVERNIGHT EVENTS:  T(C): 36.8 (11-21-21 @ 07:20), Max: 37 (11-20-21 @ 17:31)  HR: 78 (11-21-21 @ 07:20) (77 - 83)  BP: 128/81 (11-21-21 @ 07:20) (111/67 - 128/81)  RR: 18 (11-21-21 @ 07:20) (18 - 18)  SpO2: 100% (11-21-21 @ 07:20) (100% - 100%)  Wt(kg): --  I&O's Summary      PAST MEDICAL & SURGICAL HISTORY:  HTN (hypertension)    Type 2 diabetes mellitus    No significant past surgical history        SOCIAL HISTORY  Alcohol:  Tobacco:  Illicit substance use:    FAMILY HISTORY:    REVIEW OF SYSTEMS:  CONSTITUTIONAL: No fever, weight loss, or fatigue  EYES: No eye pain, visual disturbances, or discharge  ENMT:  No difficulty hearing, tinnitus, vertigo; No sinus or throat pain  NECK: No pain or stiffness  RESPIRATORY: No cough, wheezing, chills or hemoptysis; No shortness of breath  CARDIOVASCULAR: No chest pain, palpitations, dizziness, or leg swelling  GASTROINTESTINAL: No abdominal or epigastric pain. No nausea, vomiting, or hematemesis; No diarrhea or constipation. No melena or hematochezia.  GENITOURINARY: No dysuria, frequency, hematuria, or incontinence  NEUROLOGICAL: No headaches, memory loss, loss of strength, numbness, or tremors  SKIN: No itching, burning, rashes, or lesions   LYMPH NODES: No enlarged glands  ENDOCRINE: No heat or cold intolerance; No hair loss  MUSCULOSKELETAL: No joint pain or swelling; No muscle, back, or extremity pain  PSYCHIATRIC: No depression, anxiety, mood swings, or difficulty sleeping  HEME/LYMPH: No easy bruising, or bleeding gums  ALLERY AND IMMUNOLOGIC: No hives or eczema    RADIOLOGY & ADDITIONAL TESTS:    Imaging Personally Reviewed:  [ ] YES  [ ] NO    Consultant(s) Notes Reviewed:  [ ] YES  [ ] NO    PHYSICAL EXAM:  GENERAL: NAD, well-groomed, well-developed  HEAD:  Atraumatic, Normocephalic  EYES: EOMI, PERRLA, conjunctiva and sclera clear  ENMT: No tonsillar erythema, exudates, or enlargement; Moist mucous membranes, Good dentition, No lesions  NECK: Supple, No JVD, Normal thyroid  NERVOUS SYSTEM:  Alert & Oriented X3, Good concentration; Motor Strength 5/5 B/L upper and lower extremities; DTRs 2+ intact and symmetric  CHEST/LUNG: Clear to percussion bilaterally; No rales, rhonchi, wheezing, or rubs  HEART: Regular rate and rhythm; No murmurs, rubs, or gallops  ABDOMEN: Soft, Nontender, Nondistended; Bowel sounds present  EXTREMITIES:  2+ Peripheral Pulses, No clubbing, cyanosis, or edema  LYMPH: No lymphadenopathy noted  SKIN: No rashes or lesions    LABS:              CAPILLARY BLOOD GLUCOSE      POCT Blood Glucose.: 212 mg/dL (21 Nov 2021 11:49)  POCT Blood Glucose.: 162 mg/dL (21 Nov 2021 07:34)  POCT Blood Glucose.: 203 mg/dL (20 Nov 2021 21:17)  POCT Blood Glucose.: 154 mg/dL (20 Nov 2021 17:05)            MEDICATIONS  (STANDING):  atorvastatin 20 milliGRAM(s) Oral at bedtime  cefTRIAXone   IVPB 1000 milliGRAM(s) IV Intermittent every 24 hours  dextrose 40% Gel 15 Gram(s) Oral once  dextrose 5%. 1000 milliLiter(s) (50 mL/Hr) IV Continuous <Continuous>  dextrose 5%. 1000 milliLiter(s) (100 mL/Hr) IV Continuous <Continuous>  dextrose 50% Injectable 25 Gram(s) IV Push once  dextrose 50% Injectable 12.5 Gram(s) IV Push once  dextrose 50% Injectable 25 Gram(s) IV Push once  enoxaparin Injectable 40 milliGRAM(s) SubCutaneous at bedtime  glucagon  Injectable 1 milliGRAM(s) IntraMuscular once  insulin glargine Injectable (LANTUS) 15 Unit(s) SubCutaneous at bedtime  insulin lispro (ADMELOG) corrective regimen sliding scale   SubCutaneous at bedtime  insulin lispro (ADMELOG) corrective regimen sliding scale   SubCutaneous three times a day before meals  insulin lispro Injectable (ADMELOG) 5 Unit(s) SubCutaneous three times a day before meals  lactobacillus acidophilus 1 Tablet(s) Oral three times a day with meals  metroNIDAZOLE  IVPB      metroNIDAZOLE  IVPB 500 milliGRAM(s) IV Intermittent every 8 hours  sodium chloride 0.9%. 1000 milliLiter(s) (100 mL/Hr) IV Continuous <Continuous>  valsartan 80 milliGRAM(s) Oral daily    MEDICATIONS  (PRN):  acetaminophen     Tablet .. 650 milliGRAM(s) Oral every 6 hours PRN Temp greater or equal to 38C (100.4F), Mild Pain (1 - 3), Moderate Pain (4 - 6)  HYDROmorphone  Injectable 0.5 milliGRAM(s) IV Push every 3 hours PRN Moderate Pain (4 - 6)  melatonin 3 milliGRAM(s) Oral at bedtime PRN Insomnia      Care Discussed with Consultants/Other Providers [ ] YES  [ ] NO Patient is a 51y old  Female who presents with a chief complaint of Arm swelling due to dog bite (21 Nov 2021 14:11)      INTERVAL HPI/OVERNIGHT EVENTS: doing fair , afebrile   T(C): 36.8 (11-21-21 @ 07:20), Max: 37 (11-20-21 @ 17:31)  HR: 78 (11-21-21 @ 07:20) (77 - 83)  BP: 128/81 (11-21-21 @ 07:20) (111/67 - 128/81)  RR: 18 (11-21-21 @ 07:20) (18 - 18)  SpO2: 100% (11-21-21 @ 07:20) (100% - 100%)  Wt(kg): --  I&O's Summary      PAST MEDICAL & SURGICAL HISTORY:  HTN (hypertension)    Type 2 diabetes mellitus    No significant past surgical history        SOCIAL HISTORY  Alcohol:  Tobacco:  Illicit substance use:    FAMILY HISTORY:    REVIEW OF SYSTEMS:  CONSTITUTIONAL: No fever, weight loss, or fatigue  EYES: No eye pain, visual disturbances, or discharge  ENMT:  No difficulty hearing, tinnitus, vertigo; No sinus or throat pain  NECK: No pain or stiffness  RESPIRATORY: No cough, wheezing, chills or hemoptysis; No shortness of breath  CARDIOVASCULAR: No chest pain, palpitations, dizziness, or leg swelling  GASTROINTESTINAL: No abdominal or epigastric pain. No nausea, vomiting, or hematemesis; No diarrhea or constipation. No melena or hematochezia.  GENITOURINARY: No dysuria, frequency, hematuria, or incontinence  NEUROLOGICAL: No headaches, memory loss, loss of strength, numbness, or tremors  SKIN: No itching, burning, rashes, or lesions   LYMPH NODES: No enlarged glands  ENDOCRINE: No heat or cold intolerance; No hair loss  MUSCULOSKELETAL: No joint pain or swelling; No muscle, back, or extremity pain  PSYCHIATRIC: No depression, anxiety, mood swings, or difficulty sleeping  HEME/LYMPH: No easy bruising, or bleeding gums  ALLERY AND IMMUNOLOGIC: No hives or eczema    RADIOLOGY & ADDITIONAL TESTS:    Imaging Personally Reviewed:  [ ] YES  [ ] NO    Consultant(s) Notes Reviewed:  [ ] YES  [ ] NO    PHYSICAL EXAM:  GENERAL: NAD, well-groomed, well-developed  HEAD:  Atraumatic, Normocephalic  EYES: EOMI, PERRLA, conjunctiva and sclera clear  ENMT: No tonsillar erythema, exudates, or enlargement; Moist mucous membranes, Good dentition, No lesions  NECK: Supple, No JVD, Normal thyroid  NERVOUS SYSTEM:  Alert & Oriented X3, Good concentration; Motor Strength 5/5 B/L upper and lower extremities; DTRs 2+ intact and symmetric  CHEST/LUNG: Clear to percussion bilaterally; No rales, rhonchi, wheezing, or rubs  HEART: Regular rate and rhythm; No murmurs, rubs, or gallops  ABDOMEN: Soft, Nontender, Nondistended; Bowel sounds present  EXTREMITIES:  2+ Peripheral Pulses, No clubbing, cyanosis, or edema  LYMPH: No lymphadenopathy noted  SKIN: No rashes or lesions    LABS:              CAPILLARY BLOOD GLUCOSE      POCT Blood Glucose.: 212 mg/dL (21 Nov 2021 11:49)  POCT Blood Glucose.: 162 mg/dL (21 Nov 2021 07:34)  POCT Blood Glucose.: 203 mg/dL (20 Nov 2021 21:17)  POCT Blood Glucose.: 154 mg/dL (20 Nov 2021 17:05)            MEDICATIONS  (STANDING):  atorvastatin 20 milliGRAM(s) Oral at bedtime  cefTRIAXone   IVPB 1000 milliGRAM(s) IV Intermittent every 24 hours  dextrose 40% Gel 15 Gram(s) Oral once  dextrose 5%. 1000 milliLiter(s) (50 mL/Hr) IV Continuous <Continuous>  dextrose 5%. 1000 milliLiter(s) (100 mL/Hr) IV Continuous <Continuous>  dextrose 50% Injectable 25 Gram(s) IV Push once  dextrose 50% Injectable 12.5 Gram(s) IV Push once  dextrose 50% Injectable 25 Gram(s) IV Push once  enoxaparin Injectable 40 milliGRAM(s) SubCutaneous at bedtime  glucagon  Injectable 1 milliGRAM(s) IntraMuscular once  insulin glargine Injectable (LANTUS) 15 Unit(s) SubCutaneous at bedtime  insulin lispro (ADMELOG) corrective regimen sliding scale   SubCutaneous at bedtime  insulin lispro (ADMELOG) corrective regimen sliding scale   SubCutaneous three times a day before meals  insulin lispro Injectable (ADMELOG) 5 Unit(s) SubCutaneous three times a day before meals  lactobacillus acidophilus 1 Tablet(s) Oral three times a day with meals  metroNIDAZOLE  IVPB      metroNIDAZOLE  IVPB 500 milliGRAM(s) IV Intermittent every 8 hours  sodium chloride 0.9%. 1000 milliLiter(s) (100 mL/Hr) IV Continuous <Continuous>  valsartan 80 milliGRAM(s) Oral daily    MEDICATIONS  (PRN):  acetaminophen     Tablet .. 650 milliGRAM(s) Oral every 6 hours PRN Temp greater or equal to 38C (100.4F), Mild Pain (1 - 3), Moderate Pain (4 - 6)  HYDROmorphone  Injectable 0.5 milliGRAM(s) IV Push every 3 hours PRN Moderate Pain (4 - 6)  melatonin 3 milliGRAM(s) Oral at bedtime PRN Insomnia      Care Discussed with Consultants/Other Providers [ ] YES  [ ] NO

## 2021-11-21 NOTE — PROGRESS NOTE ADULT - SUBJECTIVE AND OBJECTIVE BOX
CARDIOLOGY FOLLOW UP NOTE - DR. HARDING    Patient Name: AVERY CHRISTIAN  Date of Service: 11-21-21 @ 14:11    Patient seen and examined    Subjective:    cv: denies chest pain, dyspnea, palpitations, dizziness  pulmonary: denies cough  GI: denies abdominal pain, nausea, vomiting  vascular/legs: no edema   skin: no rash  ROS: otherwise negative   overnight events:      PHYSICAL EXAM:  T(C): 36.8 (11-21-21 @ 07:20), Max: 37 (11-20-21 @ 17:31)  HR: 78 (11-21-21 @ 07:20) (77 - 83)  BP: 128/81 (11-21-21 @ 07:20) (111/67 - 128/81)  RR: 18 (11-21-21 @ 07:20) (18 - 18)  SpO2: 100% (11-21-21 @ 07:20) (100% - 100%)  Wt(kg): --  I&O's Summary    Daily     Daily     Appearance: Normal	  Cardiovascular: Normal S1 S2,RRR, No JVD, No murmurs  Respiratory: Lungs clear to auscultation	  Gastrointestinal:  Soft, Non-tender, + BS	  Extremities: Normal range of motion, No clubbing, cyanosis or edema      Home Medications:  amLODIPine 5 mg oral tablet: 1 tab(s) orally once a day (18 Nov 2021 15:13)  bacitracin 500 units/g topical ointment: Apply topically to affected area 2 times a day (18 Nov 2021 15:13)  Farxiga 5 mg oral tablet: 1 tab(s) orally once a day (18 Nov 2021 15:13)  glimepiride 2 mg oral tablet: 1 tab(s) orally once a day (18 Nov 2021 15:13)  Trulicity Pen 1.5 mg/0.5 mL subcutaneous solution: 1 dose(s) subcutaneous once a week on saturday (18 Nov 2021 15:13)  Tylenol 500 mg oral tablet: 1 tab(s) orally every 6 hours, As Needed (18 Nov 2021 15:13)  valsartan 160 mg oral capsule: 1 cap(s) orally once a day (18 Nov 2021 15:13)      MEDICATIONS  (STANDING):  atorvastatin 20 milliGRAM(s) Oral at bedtime  cefTRIAXone   IVPB 1000 milliGRAM(s) IV Intermittent every 24 hours  dextrose 40% Gel 15 Gram(s) Oral once  dextrose 5%. 1000 milliLiter(s) (50 mL/Hr) IV Continuous <Continuous>  dextrose 5%. 1000 milliLiter(s) (100 mL/Hr) IV Continuous <Continuous>  dextrose 50% Injectable 25 Gram(s) IV Push once  dextrose 50% Injectable 12.5 Gram(s) IV Push once  dextrose 50% Injectable 25 Gram(s) IV Push once  enoxaparin Injectable 40 milliGRAM(s) SubCutaneous at bedtime  glucagon  Injectable 1 milliGRAM(s) IntraMuscular once  insulin glargine Injectable (LANTUS) 15 Unit(s) SubCutaneous at bedtime  insulin lispro (ADMELOG) corrective regimen sliding scale   SubCutaneous at bedtime  insulin lispro (ADMELOG) corrective regimen sliding scale   SubCutaneous three times a day before meals  insulin lispro Injectable (ADMELOG) 5 Unit(s) SubCutaneous three times a day before meals  lactobacillus acidophilus 1 Tablet(s) Oral three times a day with meals  metroNIDAZOLE  IVPB      metroNIDAZOLE  IVPB 500 milliGRAM(s) IV Intermittent every 8 hours  sodium chloride 0.9%. 1000 milliLiter(s) (100 mL/Hr) IV Continuous <Continuous>  valsartan 80 milliGRAM(s) Oral daily      TELEMETRY: 	    ECG:  	  RADIOLOGY:   DIAGNOSTIC TESTING:  [ ] Echocardiogram:  [ ] Catheterization:  [ ] Stress Test:    OTHER: 	    LABS:	 	    CARDIAC MARKERS:                        proBNP:     Lipid Profile:   HgA1c:     Creatinine, Serum: 0.70 mg/dL (11-19-21 @ 07:07)

## 2021-11-21 NOTE — PROGRESS NOTE ADULT - SUBJECTIVE AND OBJECTIVE BOX
CC: Patient is a 51y old  Female who presents with a chief complaint of Arm swelling due to dog bite (21 Nov 2021 09:31)    ID following for dog bite wound infection, tenosynovitis    Interval History/ROS: Patient remains afebrile. Swelling and erythema continues to improve.    Rest of ROS negative.    Allergies  penicillins (Hives; Urticaria)    ANTIMICROBIALS:  cefTRIAXone   IVPB 1000 every 24 hours  metroNIDAZOLE  IVPB      OTHER MEDS:  acetaminophen     Tablet .. 650 milliGRAM(s) Oral every 6 hours PRN  atorvastatin 20 milliGRAM(s) Oral at bedtime  dextrose 40% Gel 15 Gram(s) Oral once  dextrose 5%. 1000 milliLiter(s) IV Continuous <Continuous>  dextrose 5%. 1000 milliLiter(s) IV Continuous <Continuous>  dextrose 50% Injectable 25 Gram(s) IV Push once  dextrose 50% Injectable 12.5 Gram(s) IV Push once  dextrose 50% Injectable 25 Gram(s) IV Push once  enoxaparin Injectable 40 milliGRAM(s) SubCutaneous at bedtime  glucagon  Injectable 1 milliGRAM(s) IntraMuscular once  HYDROmorphone  Injectable 0.5 milliGRAM(s) IV Push every 3 hours PRN  insulin glargine Injectable (LANTUS) 15 Unit(s) SubCutaneous at bedtime  insulin lispro (ADMELOG) corrective regimen sliding scale   SubCutaneous at bedtime  insulin lispro (ADMELOG) corrective regimen sliding scale   SubCutaneous three times a day before meals  insulin lispro Injectable (ADMELOG) 5 Unit(s) SubCutaneous three times a day before meals  lactobacillus acidophilus 1 Tablet(s) Oral three times a day with meals  melatonin 3 milliGRAM(s) Oral at bedtime PRN  sodium chloride 0.9%. 1000 milliLiter(s) IV Continuous <Continuous>  valsartan 80 milliGRAM(s) Oral daily    PE:    Vital Signs Last 24 Hrs  T(C): 36.8 (21 Nov 2021 07:20), Max: 37 (20 Nov 2021 17:31)  T(F): 98.2 (21 Nov 2021 07:20), Max: 98.6 (20 Nov 2021 17:31)  HR: 78 (21 Nov 2021 07:20) (77 - 83)  BP: 128/81 (21 Nov 2021 07:20) (111/67 - 128/81)  BP(mean): --  RR: 18 (21 Nov 2021 07:20) (18 - 18)  SpO2: 100% (21 Nov 2021 07:20) (100% - 100%)    Gen: AOx3, NAD  CV: S1+S2 normal, no murmurs  Resp: Clear bilat, no resp distress, no crackles/wheezes  Abd: Soft, nontender, +BS  Ext: No LE edema, no wounds  : no spear  IV/Skin: No thrombophlebitis, left forearm with multiple lacerations, puncture wounds x 2 packed, swelling improving. Erythema almost resolved.  Neuro: no focal deficits    LABS:    MICROBIOLOGY:  v  .Blood Blood-Venous  11-19-21   No growth to date.  --  --      .Blood Blood-Peripheral  11-19-21   No growth to date.  --  --      .Abscess Left forerarm  11-17-21   Few Escherichia coli  Rare Klebsiella pneumoniae  Numerous Enterococcus avium  Numerous Anaerobic Gram Positive Cocci Unable to Identify further  "Susceptibilities not performed"  --  Escherichia coli  Klebsiella pneumoniae  Enterococcus avium    Rapid RVP Result: NotDetec (11-17 @ 20:00)    RADIOLOGY:    < from: CT Upper Extremity w/ IV Cont, Left (11.17.21 @ 18:01) >  IMPRESSION:    1.  Skin defect in the mid forearm soft tissues with subjacent confluent edema concerning for cellulitis. No rim-enhancing fluid collection within the subcutaneous soft tissues 2. Radiopaque subcentimeter flecks in the subcutaneous tissues at the proximal forearm approximately 5 cm from the lateral epicondyle overlying the common extensor musculature concerning for small foreign bodies.  2.  Fluid collection within the tendon sheathat the myotendinous junction of the common extensor tendon subjacent to a skin wound at the mid forearm concerning for tenosynovitis of infectious or inflammatory etiology  3.  Confluent edema without definitive rim enhancement in the musculature of the proximal common extensor muscle subjacent to two radiopaque foreign bodies concerning for early fluid collection versus myositis.      < end of copied text >

## 2021-11-21 NOTE — PROGRESS NOTE ADULT - ASSESSMENT
50yo Female w/ MHx of HTN and Type 2 DM, who was bit on her left forearm by her neighbors dog (Vonda) on 11/12, she went to OSH was given tetanus shot and discharged on PO Doxycyline and topical Bacitracin    #Cellulitis left forearm -2/2 dog bite   s/p I&D   plastics : following   dressing change 3 times / day   c/w IV abx , today abx changed to rocephine and flagyl   ID following   clinically improving : afebrile now     #Sinus tachycardia  -HR improved    #HTN  on valsartan   controlled     DM-2 :  -hold oral meds   seen by endo   c/w lantus / FSBS     dvt ppx     dispo: will c/w Iv abx and regular dressing change , once cleared by ID/ Plastics , will plan for d/c     radha pitts MD

## 2021-11-21 NOTE — PROGRESS NOTE ADULT - ASSESSMENT
a/p  50yo Female w/ MHx of HTN and Type 2 DM, who was bit on her left forearm by her neighbors dog (Vonda) on 11/12, she went to OSH was given tetanus shot and discharged on PO Doxycyline and topical Bacitracin    #Sinus tachycardia  -HR improved, asymptomatic   -likely infectious driven  -echo with nl lv fxn    #Cellulitis   -2/2 dog bite   -CT UE noted  -s/p I&D  -wound culture noted   -iv abx per primary team   -ID eval  -plastics / med f/u    #HTN  -sbp stable  -hold norvasc   -cont lower dose ARB 80 mg QD   -cont to monitor     dvt ppx     35 minutes spent on total encounter; more than 50% of the visit was spent counseling and/or coordinating care by the attending physician.

## 2021-11-21 NOTE — PROGRESS NOTE ADULT - ASSESSMENT
51 years old female with PMHx of HTN and Type 2 DM, who was bit on her left forearm by her neighbors dog (Vonda) on 11/12, presented with worsening cellulitis    Unprovoked bite, owner is unclear about dog's vaccination status  Animal control is following, continue to monitor health of dog  Received tetanus and PO doxy at [place], but cellulitis progressed   CT left arm showed fluid collection within tendon sheath, consistent with tenosynovitis of common extensor tendon; 2 foreign bodies, but no subcutaneous abscess  Abscess Cx 11/18 E. coli, Klebsiella, E. avium and anaerobes.    Antibiotic:  Cipro 11/17  Clindamycin 11/17 - 11/19  Levaquin 11/18 - 11/19  Cefepime 11/19 ->11/21  Ceftriaxone 11/21->  Flagyl 11/21 ->    No history of splenectomy  No dysphagia, hydrophobia; no clinical symptoms of rabies  No clinical signs of septic arthritis of left elbow  No clinical signs of compartment syndrome  Clinically improving  E. avium has low virulence so will not cover for it at this point    IMPRESSION:  Left forearm tenosynovitis and myositis  Foreign body in left arm  positive culture finding  cellulitis/ wound infection improving     RECOMMENDATIONS:  - Change cefepime to ceftriaxone and flagyl (ordered)  - monitor for signs of allergic reaction, discussed with patient the change and she is ok with changing  - F/U blood cultures in lab  - Plastic following  - c/w wound care   - glycemic control for optimal wound healing   - f/u with animal control regarding the health of the dog    Silverio Hinson MD  Pager [phone]  After 5pm/weekends call [phone]

## 2021-11-22 LAB
GLUCOSE BLDC GLUCOMTR-MCNC: 147 MG/DL — HIGH (ref 70–99)
GLUCOSE BLDC GLUCOMTR-MCNC: 157 MG/DL — HIGH (ref 70–99)
GLUCOSE BLDC GLUCOMTR-MCNC: 167 MG/DL — HIGH (ref 70–99)
GLUCOSE BLDC GLUCOMTR-MCNC: 195 MG/DL — HIGH (ref 70–99)

## 2021-11-22 PROCEDURE — 99232 SBSQ HOSP IP/OBS MODERATE 35: CPT

## 2021-11-22 RX ORDER — INFLUENZA VIRUS VACCINE 15; 15; 15; 15 UG/.5ML; UG/.5ML; UG/.5ML; UG/.5ML
0.5 SUSPENSION INTRAMUSCULAR ONCE
Refills: 0 | Status: DISCONTINUED | OUTPATIENT
Start: 2021-11-22 | End: 2021-11-24

## 2021-11-22 RX ADMIN — Medication 1 TABLET(S): at 12:23

## 2021-11-22 RX ADMIN — CEFTRIAXONE 100 MILLIGRAM(S): 500 INJECTION, POWDER, FOR SOLUTION INTRAMUSCULAR; INTRAVENOUS at 00:04

## 2021-11-22 RX ADMIN — ATORVASTATIN CALCIUM 20 MILLIGRAM(S): 80 TABLET, FILM COATED ORAL at 22:27

## 2021-11-22 RX ADMIN — Medication 5 UNIT(S): at 17:39

## 2021-11-22 RX ADMIN — Medication 1: at 07:52

## 2021-11-22 RX ADMIN — CEFTRIAXONE 100 MILLIGRAM(S): 500 INJECTION, POWDER, FOR SOLUTION INTRAMUSCULAR; INTRAVENOUS at 18:52

## 2021-11-22 RX ADMIN — Medication 5 UNIT(S): at 12:24

## 2021-11-22 RX ADMIN — Medication 100 MILLIGRAM(S): at 22:25

## 2021-11-22 RX ADMIN — Medication 5 UNIT(S): at 07:51

## 2021-11-22 RX ADMIN — INSULIN GLARGINE 15 UNIT(S): 100 INJECTION, SOLUTION SUBCUTANEOUS at 22:26

## 2021-11-22 RX ADMIN — Medication 100 MILLIGRAM(S): at 06:42

## 2021-11-22 RX ADMIN — Medication 100 MILLIGRAM(S): at 14:18

## 2021-11-22 RX ADMIN — Medication 1: at 12:23

## 2021-11-22 RX ADMIN — Medication 1 TABLET(S): at 17:40

## 2021-11-22 RX ADMIN — ENOXAPARIN SODIUM 40 MILLIGRAM(S): 100 INJECTION SUBCUTANEOUS at 22:27

## 2021-11-22 NOTE — PROGRESS NOTE ADULT - ASSESSMENT
51 years old female with PMHx of HTN and Type 2 DM, who was bit on her left forearm by her neighbors dog (Vonda) on 11/12, presented with worsening cellulitis    Unprovoked bite, owner is unclear about dog's vaccination status  Animal control is following, continue to monitor health of dog  Received tetanus and PO doxy at [place], but cellulitis progressed   CT left arm showed fluid collection within tendon sheath, consistent with tenosynovitis of common extensor tendon; 2 foreign bodies, but no subcutaneous abscess  Abscess Cx 11/18 E. coli, Klebsiella, E. avium and anaerobes.    Antibiotic:  Cipro 11/17  Clindamycin 11/17 - 11/19  Levaquin 11/18 - 11/19  Cefepime 11/19 ->11/21  Ceftriaxone 11/21->  Flagyl 11/21 ->    No history of splenectomy  No dysphagia, hydrophobia; no clinical symptoms of rabies  No clinical signs of septic arthritis of left elbow  No clinical signs of compartment syndrome  Clinically improving  E. avium has low virulence so will not cover for it at this point    IMPRESSION:  Left forearm tenosynovitis and myositis  Foreign body in left arm  positive culture finding, polymicrobial infection.   cellulitis/ wound infection improving       RECOMMENDATIONS:  - c/w ceftriaxone and flagyl while inpt.   - tolerating abx well.   - F/U blood cultures, NTD   - Plastic following  - c/w wound care   - glycemic control for optimal wound healing   - f/u with animal control regarding the health of the dog  - can switch to po levaquin and flagyl to complete 4 weeks on discharge.       Joan Mccurdy  Pager: [phone]. If no response or past 5 pm call [phone].

## 2021-11-22 NOTE — DIETITIAN INITIAL EVALUATION ADULT. - ORAL INTAKE PTA/DIET HISTORY
Pt with good po intake at home but not compliant to carbohydrate controlled diet and eat bigger portions of pasta and Ice creams.    .

## 2021-11-22 NOTE — PROGRESS NOTE ADULT - SUBJECTIVE AND OBJECTIVE BOX
CARDIOLOGY FOLLOW UP NOTE - DR. HARDING    Patient Name: AVERY CHRISTIAN  Date of Service: 11-22-21 @ 14:16    Patient seen and examined    Subjective:    cv: denies chest pain, dyspnea, palpitations, dizziness  pulmonary: denies cough  GI: denies abdominal pain, nausea, vomiting  vascular/legs: no edema   skin: no rash  ROS: otherwise negative   overnight events:      PHYSICAL EXAM:  T(C): 36.4 (11-22-21 @ 09:12), Max: 36.8 (11-21-21 @ 22:00)  HR: 84 (11-22-21 @ 09:12) (74 - 84)  BP: 119/75 (11-22-21 @ 09:12) (106/78 - 126/82)  RR: 18 (11-22-21 @ 09:12) (18 - 18)  SpO2: 100% (11-22-21 @ 09:12) (100% - 100%)  Wt(kg): --  I&O's Summary    Daily     Daily     Appearance: Normal	  Cardiovascular: Normal S1 S2,RRR, No JVD, No murmurs  Respiratory: Lungs clear to auscultation	  Gastrointestinal:  Soft, Non-tender, + BS	  Extremities: Normal range of motion, No clubbing, cyanosis or edema      Home Medications:  amLODIPine 5 mg oral tablet: 1 tab(s) orally once a day (18 Nov 2021 15:13)  bacitracin 500 units/g topical ointment: Apply topically to affected area 2 times a day (18 Nov 2021 15:13)  Farxiga 5 mg oral tablet: 1 tab(s) orally once a day (18 Nov 2021 15:13)  glimepiride 2 mg oral tablet: 1 tab(s) orally once a day (18 Nov 2021 15:13)  Trulicity Pen 1.5 mg/0.5 mL subcutaneous solution: 1 dose(s) subcutaneous once a week on saturday (18 Nov 2021 15:13)  Tylenol 500 mg oral tablet: 1 tab(s) orally every 6 hours, As Needed (18 Nov 2021 15:13)  valsartan 160 mg oral capsule: 1 cap(s) orally once a day (18 Nov 2021 15:13)      MEDICATIONS  (STANDING):  atorvastatin 20 milliGRAM(s) Oral at bedtime  cefTRIAXone   IVPB 1000 milliGRAM(s) IV Intermittent every 24 hours  dextrose 40% Gel 15 Gram(s) Oral once  dextrose 5%. 1000 milliLiter(s) (50 mL/Hr) IV Continuous <Continuous>  dextrose 5%. 1000 milliLiter(s) (100 mL/Hr) IV Continuous <Continuous>  dextrose 50% Injectable 25 Gram(s) IV Push once  dextrose 50% Injectable 12.5 Gram(s) IV Push once  dextrose 50% Injectable 25 Gram(s) IV Push once  enoxaparin Injectable 40 milliGRAM(s) SubCutaneous at bedtime  glucagon  Injectable 1 milliGRAM(s) IntraMuscular once  influenza   Vaccine 0.5 milliLiter(s) IntraMuscular once  insulin glargine Injectable (LANTUS) 15 Unit(s) SubCutaneous at bedtime  insulin lispro (ADMELOG) corrective regimen sliding scale   SubCutaneous at bedtime  insulin lispro (ADMELOG) corrective regimen sliding scale   SubCutaneous three times a day before meals  insulin lispro Injectable (ADMELOG) 5 Unit(s) SubCutaneous three times a day before meals  lactobacillus acidophilus 1 Tablet(s) Oral three times a day with meals  metroNIDAZOLE  IVPB      metroNIDAZOLE  IVPB 500 milliGRAM(s) IV Intermittent every 8 hours  sodium chloride 0.9%. 1000 milliLiter(s) (100 mL/Hr) IV Continuous <Continuous>  valsartan 80 milliGRAM(s) Oral daily      TELEMETRY: 	    ECG:  	  RADIOLOGY:   DIAGNOSTIC TESTING:  [ ] Echocardiogram:  [ ] Catheterization:  [ ] Stress Test:    OTHER: 	    LABS:	 	    CARDIAC MARKERS:                        proBNP:     Lipid Profile:   HgA1c:

## 2021-11-22 NOTE — DIETITIAN INITIAL EVALUATION ADULT. - PERTINENT MEDS FT
MEDICATIONS  (STANDING):  atorvastatin 20 milliGRAM(s) Oral at bedtime  cefTRIAXone   IVPB 1000 milliGRAM(s) IV Intermittent every 24 hours  dextrose 40% Gel 15 Gram(s) Oral once  dextrose 5%. 1000 milliLiter(s) (50 mL/Hr) IV Continuous <Continuous>  dextrose 5%. 1000 milliLiter(s) (100 mL/Hr) IV Continuous <Continuous>  dextrose 50% Injectable 25 Gram(s) IV Push once  dextrose 50% Injectable 12.5 Gram(s) IV Push once  dextrose 50% Injectable 25 Gram(s) IV Push once  enoxaparin Injectable 40 milliGRAM(s) SubCutaneous at bedtime  glucagon  Injectable 1 milliGRAM(s) IntraMuscular once  influenza   Vaccine 0.5 milliLiter(s) IntraMuscular once  insulin glargine Injectable (LANTUS) 15 Unit(s) SubCutaneous at bedtime  insulin lispro (ADMELOG) corrective regimen sliding scale   SubCutaneous at bedtime  insulin lispro (ADMELOG) corrective regimen sliding scale   SubCutaneous three times a day before meals  insulin lispro Injectable (ADMELOG) 5 Unit(s) SubCutaneous three times a day before meals  lactobacillus acidophilus 1 Tablet(s) Oral three times a day with meals  metroNIDAZOLE  IVPB      metroNIDAZOLE  IVPB 500 milliGRAM(s) IV Intermittent every 8 hours  sodium chloride 0.9%. 1000 milliLiter(s) (100 mL/Hr) IV Continuous <Continuous>  valsartan 80 milliGRAM(s) Oral daily

## 2021-11-22 NOTE — PROGRESS NOTE ADULT - ASSESSMENT
50yo woman with Type 2 DM uncontrolled (HbA1C 9.1) on orals c/b neuropathy. Also with hx of HTN. Admitted for cellulitis after being bitten by dog on left forearm on 11/12. Endocrine consulted for T2DM management.    1. T2DM uncontrolled c/b neuropathy  A1c 9.1%, goal less than 7%  Home Regimen: Trulicity 1.5 mg SQ weekly, Farxiga 5 mg daily, Glimepiride 2 mg daily    While inpatient:  BG target 100-180 mg/dl  Within target  Continue Lantus 15 units SQ qHS  Continue Admelog 5 units SQ TID before meals (Hold if NPO/not eating meal)  Continue Admelog LOW dose correctional scale before meals; low dose at bedtime  Check BG before meals and bedtime  Consistent carbohydrate diet, RD consult ordered and pending    Discharge Plan:  Increase Farxiga to 10 mg daily. Continue Glimepiride 2 mg daily (consider switching to Metformin ER as outpatient). Suggest increasing to Trulicity 3 mg SQ weekly, if unable to get 3 mg dose can potentially take 2 injections (of the 1.5 mg dose), also discussed diet and lifestyle interventions to implement for now  Ensure she has glucometer, glucose test strips, lancets, alcohol swabs   Outpatient followup with Endocrinologist (Dr. Abigail Hoffman).    2. HTN  Goal BP<130/80, within target  Continue ARB, further management per primary team    3. HLD  LDL 98 (goal <70)  Patient reports recently being started on Atorvastatin 20 mg daily by cardiologist but hasn't been taking consistently  Continue Atorvastatin 20 mg daily, encourage adherence     Lizett Blair  Nurse Practitioner  Division of Endocrinology & Diabetes  In house pager #[phone]/long range pager #[phone]    If before 9AM or after 6PM, or on weekends/holidays, please call endocrine answering service for assistance ([phone]).  For nonurgent matters email [email or web address] for assistance.      52yo woman with Type 2 DM uncontrolled (HbA1C 9.1) on orals c/b neuropathy. Also with hx of HTN. Admitted for cellulitis after being bitten by dog on left forearm on 11/12. Endocrine consulted for T2DM management.    1. T2DM uncontrolled c/b neuropathy  A1c 9.1%, goal less than 7%  Home Regimen: Trulicity 1.5 mg SQ weekly, Farxiga 5 mg daily, Glimepiride 2 mg daily    While inpatient:  BG target 100-180 mg/dl  Within target  Continue Lantus 15 units SQ qHS  Continue Admelog 5 units SQ TID before meals (Hold if NPO/not eating meal)  Continue Admelog LOW dose correctional scale before meals; low dose at bedtime  Check BG before meals and bedtime  Consistent carbohydrate diet, RD consult ordered and pending    Discharge Plan:  Increase Farxiga to 10 mg daily. Continue Glimepiride 2 mg daily (consider switching to Metformin ER as outpatient). Suggest increasing to Trulicity 3 mg SQ weekly, if unable to get 3 mg dose can potentially take 2 injections (of the 1.5 mg dose), also discussed diet and lifestyle interventions to implement, starting now  Ensure she has glucometer, glucose test strips, lancets, alcohol swabs   Outpatient followup with Endocrinologist (Dr. Abigail Hoffman).    2. HTN  Goal BP<130/80, within target  Continue ARB, further management per primary team    3. HLD  LDL 98 (goal <70)  Patient reports recently being started on Atorvastatin 20 mg daily by cardiologist but hasn't been taking consistently  Continue Atorvastatin 20 mg daily, encourage adherence     Lizett Blair  Nurse Practitioner  Division of Endocrinology & Diabetes  In house pager #[phone]/long range pager #[phone]    If before 9AM or after 6PM, or on weekends/holidays, please call endocrine answering service for assistance ([phone]).  For nonurgent matters email [email or web address] for assistance.

## 2021-11-22 NOTE — DIETITIAN INITIAL EVALUATION ADULT. - PROBLEM SELECTOR PLAN 2
Erythema, swelling, worsening despite outpt PO Doxycycline.  - CT LUE showing "Skin defect in the mid forearm soft tissues with subjacent confluent edema concerning for cellulitis. Fluid collection within the tendon sheath at the myotendinous junction of the common extensor tendon subjacent to a skin wound at the mid forearm concerning for tenosynovitis of infectious or inflammatory etiology. Confluent edema without definitive rim enhancement in the musculature of the proximal common extensor muscle subjacent to two radiopaque foreign bodies concerning for early fluid collection versus myositis."   - Started IV Clindamycin and IV Levofloxacin   - Call ID c/s in AM   - Seen by Plastic Surgery appreciate recommendations   - Press and express around wound area 2-3x/day  - Wound care c/s ordered   - Pain control: Tylenol PRN, Dilaudid IVP PRN for moderate pain (RN to notify if pain is severe)

## 2021-11-22 NOTE — PROGRESS NOTE ADULT - SUBJECTIVE AND OBJECTIVE BOX
Plastic Surgery    SUBJECTIVE: Pt seen and examined on rounds with team. No acute events overnight.        OBJECTIVE    PHYSICAL EXAM:   General: Alert, NAD  Respiratory: non-labored breathing  Extremities: Erythema and edema of left forearm improving. able to extend pip/dip, however unable to extend MCP with wrist extended. Radial nerve intact, sensation intact    VITALS  T(C): 36.6 (11-22-21 @ 06:30), Max: 36.8 (11-21-21 @ 22:00)  HR: 74 (11-22-21 @ 06:30) (74 - 79)  BP: 106/78 (11-22-21 @ 06:30) (106/78 - 126/82)  RR: 18 (11-22-21 @ 06:30) (18 - 18)  SpO2: 100% (11-22-21 @ 06:30) (100% - 100%)  CAPILLARY BLOOD GLUCOSE      POCT Blood Glucose.: 241 mg/dL (21 Nov 2021 21:33)  POCT Blood Glucose.: 124 mg/dL (21 Nov 2021 17:11)  POCT Blood Glucose.: 212 mg/dL (21 Nov 2021 11:49)  POCT Blood Glucose.: 162 mg/dL (21 Nov 2021 07:34)      Is/Os      MEDICATIONS (STANDING): atorvastatin 20 milliGRAM(s) Oral at bedtime  cefTRIAXone   IVPB 1000 milliGRAM(s) IV Intermittent every 24 hours  dextrose 40% Gel 15 Gram(s) Oral once  dextrose 5%. 1000 milliLiter(s) IV Continuous <Continuous>  dextrose 5%. 1000 milliLiter(s) IV Continuous <Continuous>  dextrose 50% Injectable 25 Gram(s) IV Push once  dextrose 50% Injectable 12.5 Gram(s) IV Push once  dextrose 50% Injectable 25 Gram(s) IV Push once  enoxaparin Injectable 40 milliGRAM(s) SubCutaneous at bedtime  glucagon  Injectable 1 milliGRAM(s) IntraMuscular once  influenza   Vaccine 0.5 milliLiter(s) IntraMuscular once  insulin glargine Injectable (LANTUS) 15 Unit(s) SubCutaneous at bedtime  insulin lispro (ADMELOG) corrective regimen sliding scale   SubCutaneous at bedtime  insulin lispro (ADMELOG) corrective regimen sliding scale   SubCutaneous three times a day before meals  insulin lispro Injectable (ADMELOG) 5 Unit(s) SubCutaneous three times a day before meals  lactobacillus acidophilus 1 Tablet(s) Oral three times a day with meals  metroNIDAZOLE  IVPB      metroNIDAZOLE  IVPB 500 milliGRAM(s) IV Intermittent every 8 hours  sodium chloride 0.9%. 1000 milliLiter(s) IV Continuous <Continuous>  valsartan 80 milliGRAM(s) Oral daily    MEDICATIONS (PRN):acetaminophen     Tablet .. 650 milliGRAM(s) Oral every 6 hours PRN Temp greater or equal to 38C (100.4F), Mild Pain (1 - 3), Moderate Pain (4 - 6)  HYDROmorphone  Injectable 0.5 milliGRAM(s) IV Push every 3 hours PRN Moderate Pain (4 - 6)  melatonin 3 milliGRAM(s) Oral at bedtime PRN Insomnia      LABS                  IMAGING STUDIES

## 2021-11-22 NOTE — DIETITIAN INITIAL EVALUATION ADULT. - PERTINENT LABORATORY DATA
11-19 Na 136 mmol/L Glu 199 mg/dL<H> K+ 3.8 mmol/L Cr 0.70 mg/dL BUN 11 mg/dL Phos n/a    11-22 @ 11:47 POCT 167 mg/dL  11-22 @ 07:28 POCT 157 mg/dL  11-21 @ 21:33 POCT 241 mg/dL  11-21 @ 17:11 POCT 124 mg/dL    (11/19/21) A1c- 9.1% (H)

## 2021-11-22 NOTE — PROGRESS NOTE ADULT - SUBJECTIVE AND OBJECTIVE BOX
Chief Complaint: DM 2    History: Patient seen at bedside. Reports she is eating full meals, no nausea/vomiting, no s/s of hypoglycemia  BG trending within target range, most recent 167 mg/dl  Discussed recommendations to improve diabetes control/A1c, currently 9.1% - suggested modifications to outpatient regimen, dietary and lifestyle interventions. Reviewed increasing GLP 1 and adding Metformin, patient would like to discuss with outpatient endocrinologist. Patient had GI side effects with Metformin in the past. She also recently received 90 day supply of Trulicity 1.5 mg  She is interested in seeing RD here to further discuss diet  Reports she had an appt this week with her endocrinologist Dr. Hoffman, she has been in contact with the office to reschedule    MEDICATIONS  (STANDING):  atorvastatin 20 milliGRAM(s) Oral at bedtime  cefTRIAXone   IVPB 1000 milliGRAM(s) IV Intermittent every 24 hours  dextrose 40% Gel 15 Gram(s) Oral once  dextrose 5%. 1000 milliLiter(s) (50 mL/Hr) IV Continuous <Continuous>  dextrose 5%. 1000 milliLiter(s) (100 mL/Hr) IV Continuous <Continuous>  dextrose 50% Injectable 25 Gram(s) IV Push once  dextrose 50% Injectable 12.5 Gram(s) IV Push once  dextrose 50% Injectable 25 Gram(s) IV Push once  enoxaparin Injectable 40 milliGRAM(s) SubCutaneous at bedtime  glucagon  Injectable 1 milliGRAM(s) IntraMuscular once  influenza   Vaccine 0.5 milliLiter(s) IntraMuscular once  insulin glargine Injectable (LANTUS) 15 Unit(s) SubCutaneous at bedtime  insulin lispro (ADMELOG) corrective regimen sliding scale   SubCutaneous at bedtime  insulin lispro (ADMELOG) corrective regimen sliding scale   SubCutaneous three times a day before meals  insulin lispro Injectable (ADMELOG) 5 Unit(s) SubCutaneous three times a day before meals  lactobacillus acidophilus 1 Tablet(s) Oral three times a day with meals  metroNIDAZOLE  IVPB      metroNIDAZOLE  IVPB 500 milliGRAM(s) IV Intermittent every 8 hours  sodium chloride 0.9%. 1000 milliLiter(s) (100 mL/Hr) IV Continuous <Continuous>  valsartan 80 milliGRAM(s) Oral daily    MEDICATIONS  (PRN):  acetaminophen     Tablet .. 650 milliGRAM(s) Oral every 6 hours PRN Temp greater or equal to 38C (100.4F), Mild Pain (1 - 3), Moderate Pain (4 - 6)  HYDROmorphone  Injectable 0.5 milliGRAM(s) IV Push every 3 hours PRN Moderate Pain (4 - 6)  melatonin 3 milliGRAM(s) Oral at bedtime PRN Insomnia    Allergies  penicillins (Hives; Urticaria)    Review of Systems:  HEENT: No pain  Cardiovascular: No chest pain  Respiratory: No SOB  GI: No nausea, vomiting    PHYSICAL EXAM:  VITALS: T(C): 36.4 (11-22-21 @ 09:12)  T(F): 97.6 (11-22-21 @ 09:12), Max: 98.2 (11-21-21 @ 22:00)  HR: 84 (11-22-21 @ 09:12) (74 - 84)  BP: 119/75 (11-22-21 @ 09:12) (106/78 - 126/82)  RR:  (18 - 18)  SpO2:  (100% - 100%)  Wt(kg): --  GENERAL: NAD  EYES: No proptosis, no lid lag, anicteric  HEENT:  Atraumatic, Normocephalic, moist mucous membranes  RESPIRATORY: unlabored respirations   PSYCH: Alert and oriented x 3, normal affect, normal mood    CAPILLARY BLOOD GLUCOSE    POCT Blood Glucose.: 167 mg/dL (22 Nov 2021 11:47)  POCT Blood Glucose.: 157 mg/dL (22 Nov 2021 07:28)  POCT Blood Glucose.: 241 mg/dL (21 Nov 2021 21:33)  POCT Blood Glucose.: 124 mg/dL (21 Nov 2021 17:11)        Thyroid Function Tests:  11-18 @ 07:10 TSH 1.27 FreeT4 -- T3 -- Anti TPO -- Anti Thyroglobulin Ab -- TSI --      A1C with Estimated Average Glucose Result: 9.1 % (11-19-21 @ 07:07)    Diet, Consistent Carbohydrate/No Snacks:   DASH/TLC Sodium & Cholesterol Restricted (DASH) (11-18-21 @ 00:24)

## 2021-11-22 NOTE — DIETITIAN INITIAL EVALUATION ADULT. - PROBLEM SELECTOR PLAN 4
FS>200 x 3  - Hold Trulicity, Farxiga and Glimepiride while inpatient;   - Will likely require Lantus while inpt; --> start Lantus 10u qhs and Moderate ISS qac, adjust PRN   - f/u A1C  - monitor FS  - diabetic diet

## 2021-11-22 NOTE — PROGRESS NOTE ADULT - ASSESSMENT
51 y.o F w/ a hx of DM that was admitted for worsening cellulitis and infection secondary to a dog bite to the [facility]    Plan:  - Continue drainage with gauze and pressing/expressing 3x a day  - pack wound with moistened gauze and wrap with kerlix.   - f/u blood cultures  - IV abx - ID recs Cefepime   - care per primary team      00050 PRS 51 y.o F w/ a hx of DM that was admitted for worsening cellulitis and infection secondary to a dog bite to the [facility]    Plan:  - Continue drainage with gauze and pressing/expressing 3x a day  - pack wound with moistened gauze and wrap with kerlix.   - f/u blood cultures  - IV abx - ID recs Ceftriaxone and flagyl  - care per primary team      [id number] PRS

## 2021-11-22 NOTE — PROGRESS NOTE ADULT - SUBJECTIVE AND OBJECTIVE BOX
51y old  Female who presents with a chief complaint of Arm swelling due to dog bite (22 Nov 2021 15:11)      Interval history:  Afebrile, feeling better, arm pain improving, still unable to make a fist.       Allergies:   penicillins (Hives; Urticaria)      Antimicrobials:  cefTRIAXone   IVPB 1000 milliGRAM(s) IV Intermittent every 24 hours  metroNIDAZOLE  IVPB 500 milliGRAM(s) IV Intermittent every 8 hours      REVIEW OF SYSTEMS:  No chest pain  No SOB  No abdominal pain  No dysuria  No rash.       Vital Signs Last 24 Hrs  T(C): 36.8 (11-22-21 @ 17:19), Max: 36.8 (11-21-21 @ 22:00)  T(F): 98.2 (11-22-21 @ 17:19), Max: 98.2 (11-21-21 @ 22:00)  HR: 79 (11-22-21 @ 17:19) (74 - 84)  BP: 113/75 (11-22-21 @ 17:19) (106/78 - 126/82)  BP(mean): --  RR: 18 (11-22-21 @ 17:19) (18 - 18)  SpO2: 100% (11-22-21 @ 17:19) (100% - 100%)      PHYSICAL EXAM:  Patient in no acute distress. AAOX3.  No icterus, no oral ulcers.  Cardiovascular: S1S2 normal.  Lungs: + air entry B/L lung fields.  Gastrointestinal: soft, nontender, nondistended.  Extremities: no edema. Lt forearm with dressing, unable to move fingers spontaneously.   IV sites not inflamed.         No new labs performed today.

## 2021-11-22 NOTE — PROGRESS NOTE ADULT - ASSESSMENT
a/p  52yo Female w/ MHx of HTN and Type 2 DM, who was bit on her left forearm by her neighbors dog (Vonda) on 11/12, she went to OSH was given tetanus shot and discharged on PO Doxycyline and topical Bacitracin    #Sinus tachycardia  -HR improved, asymptomatic   -likely infectious driven  -echo with nl lv fxn    #Cellulitis   -2/2 dog bite   -CT UE noted  -s/p I&D  -wound culture noted   -iv abx per primary team   -ID eval  -plastics / med f/u    #HTN  -sbp stable  -hold norvasc   -cont lower dose ARB 80 mg QD   -cont to monitor     dvt ppx     35 minutes spent on total encounter; more than 50% of the visit was spent counseling and/or coordinating care by the attending physician.

## 2021-11-22 NOTE — DIETITIAN INITIAL EVALUATION ADULT. - OTHER INFO
50 yo Female admitted due to dog bite on Lt Forearm and PMH of HTN and Type 2 DM.  Pt AOx4 and reports good po intake of 100% at meal time with No GI distress (nausea/vomiting/diarrhea/constipation.) at present.  Labs reviewed.  Noted elevated A1c- 9.1% (11/19/21) due to diet non- compliance as pt reports her diet recall.  Pt is willing to learn and follow diet.  Provided Consistent carbohydrate diet education to pt and pt willing to comply better to it this time. Skin with Lt arm dog bite & +2 edema at Lt arm and hand per nursing flow sheet.   .

## 2021-11-23 ENCOUNTER — TRANSCRIPTION ENCOUNTER (OUTPATIENT)
Age: 51
End: 2021-11-23

## 2021-11-23 LAB
ALBUMIN SERPL ELPH-MCNC: 3.8 G/DL — SIGNIFICANT CHANGE UP (ref 3.3–5)
ALP SERPL-CCNC: 110 U/L — SIGNIFICANT CHANGE UP (ref 40–120)
ALT FLD-CCNC: 13 U/L — SIGNIFICANT CHANGE UP (ref 4–33)
ANION GAP SERPL CALC-SCNC: 9 MMOL/L — SIGNIFICANT CHANGE UP (ref 7–14)
AST SERPL-CCNC: 14 U/L — SIGNIFICANT CHANGE UP (ref 4–32)
BILIRUB SERPL-MCNC: 0.3 MG/DL — SIGNIFICANT CHANGE UP (ref 0.2–1.2)
BUN SERPL-MCNC: 12 MG/DL — SIGNIFICANT CHANGE UP (ref 7–23)
CALCIUM SERPL-MCNC: 9.5 MG/DL — SIGNIFICANT CHANGE UP (ref 8.4–10.5)
CHLORIDE SERPL-SCNC: 102 MMOL/L — SIGNIFICANT CHANGE UP (ref 98–107)
CO2 SERPL-SCNC: 27 MMOL/L — SIGNIFICANT CHANGE UP (ref 22–31)
CREAT SERPL-MCNC: 0.69 MG/DL — SIGNIFICANT CHANGE UP (ref 0.5–1.3)
GLUCOSE BLDC GLUCOMTR-MCNC: 164 MG/DL — HIGH (ref 70–99)
GLUCOSE BLDC GLUCOMTR-MCNC: 170 MG/DL — HIGH (ref 70–99)
GLUCOSE BLDC GLUCOMTR-MCNC: 193 MG/DL — HIGH (ref 70–99)
GLUCOSE BLDC GLUCOMTR-MCNC: 193 MG/DL — HIGH (ref 70–99)
GLUCOSE SERPL-MCNC: 163 MG/DL — HIGH (ref 70–99)
HCT VFR BLD CALC: 37.5 % — SIGNIFICANT CHANGE UP (ref 34.5–45)
HGB BLD-MCNC: 11.7 G/DL — SIGNIFICANT CHANGE UP (ref 11.5–15.5)
MAGNESIUM SERPL-MCNC: 2.1 MG/DL — SIGNIFICANT CHANGE UP (ref 1.6–2.6)
MCHC RBC-ENTMCNC: 28.3 PG — SIGNIFICANT CHANGE UP (ref 27–34)
MCHC RBC-ENTMCNC: 31.2 GM/DL — LOW (ref 32–36)
MCV RBC AUTO: 90.8 FL — SIGNIFICANT CHANGE UP (ref 80–100)
NRBC # BLD: 0 /100 WBCS — SIGNIFICANT CHANGE UP
NRBC # FLD: 0 K/UL — SIGNIFICANT CHANGE UP
PHOSPHATE SERPL-MCNC: 3.5 MG/DL — SIGNIFICANT CHANGE UP (ref 2.5–4.5)
PLATELET # BLD AUTO: 423 K/UL — HIGH (ref 150–400)
POTASSIUM SERPL-MCNC: 4.2 MMOL/L — SIGNIFICANT CHANGE UP (ref 3.5–5.3)
POTASSIUM SERPL-SCNC: 4.2 MMOL/L — SIGNIFICANT CHANGE UP (ref 3.5–5.3)
PROT SERPL-MCNC: 8.3 G/DL — SIGNIFICANT CHANGE UP (ref 6–8.3)
RBC # BLD: 4.13 M/UL — SIGNIFICANT CHANGE UP (ref 3.8–5.2)
RBC # FLD: 12.5 % — SIGNIFICANT CHANGE UP (ref 10.3–14.5)
SODIUM SERPL-SCNC: 138 MMOL/L — SIGNIFICANT CHANGE UP (ref 135–145)
WBC # BLD: 7.6 K/UL — SIGNIFICANT CHANGE UP (ref 3.8–10.5)
WBC # FLD AUTO: 7.6 K/UL — SIGNIFICANT CHANGE UP (ref 3.8–10.5)

## 2021-11-23 RX ORDER — BACITRACIN ZINC 500 UNIT/G
1 OINTMENT IN PACKET (EA) TOPICAL THREE TIMES A DAY
Refills: 0 | Status: DISCONTINUED | OUTPATIENT
Start: 2021-11-23 | End: 2021-11-24

## 2021-11-23 RX ADMIN — Medication 1 APPLICATION(S): at 15:29

## 2021-11-23 RX ADMIN — CEFTRIAXONE 100 MILLIGRAM(S): 500 INJECTION, POWDER, FOR SOLUTION INTRAMUSCULAR; INTRAVENOUS at 18:41

## 2021-11-23 RX ADMIN — Medication 1: at 17:42

## 2021-11-23 RX ADMIN — Medication 5 UNIT(S): at 07:52

## 2021-11-23 RX ADMIN — Medication 5 UNIT(S): at 12:06

## 2021-11-23 RX ADMIN — Medication 5 UNIT(S): at 17:41

## 2021-11-23 RX ADMIN — Medication 100 MILLIGRAM(S): at 22:13

## 2021-11-23 RX ADMIN — Medication 1: at 07:52

## 2021-11-23 RX ADMIN — Medication 1 TABLET(S): at 17:41

## 2021-11-23 RX ADMIN — Medication 100 MILLIGRAM(S): at 13:05

## 2021-11-23 RX ADMIN — VALSARTAN 80 MILLIGRAM(S): 80 TABLET ORAL at 06:06

## 2021-11-23 RX ADMIN — Medication 1: at 12:06

## 2021-11-23 RX ADMIN — Medication 1 TABLET(S): at 07:49

## 2021-11-23 RX ADMIN — ATORVASTATIN CALCIUM 20 MILLIGRAM(S): 80 TABLET, FILM COATED ORAL at 22:12

## 2021-11-23 RX ADMIN — ENOXAPARIN SODIUM 40 MILLIGRAM(S): 100 INJECTION SUBCUTANEOUS at 22:12

## 2021-11-23 RX ADMIN — Medication 1 APPLICATION(S): at 22:11

## 2021-11-23 RX ADMIN — Medication 1 TABLET(S): at 12:02

## 2021-11-23 RX ADMIN — INSULIN GLARGINE 15 UNIT(S): 100 INJECTION, SOLUTION SUBCUTANEOUS at 22:13

## 2021-11-23 RX ADMIN — Medication 100 MILLIGRAM(S): at 06:06

## 2021-11-23 NOTE — DISCHARGE NOTE PROVIDER - NSDCCPCAREPLAN_GEN_ALL_CORE_FT
PRINCIPAL DISCHARGE DIAGNOSIS  Diagnosis: Wound infection  Assessment and Plan of Treatment: Continue medications as prescribed. Follow up with your primary care doctor for further evaluation and management. Please call to make an appointment within 1-2 weeks of discharge.  You will complete a total of 4 weeks of Levaquin on discharge and follow up wit the infectious disease doctor for further treatment and monitoring of the infection.         SECONDARY DISCHARGE DIAGNOSES  Diagnosis: HTN (hypertension)  Assessment and Plan of Treatment: Continue blood pressure medication regimen as directed. Follow a low salt/cholesterol diet. Monitor for any visual changes, headaches or dizziness.  Monitor blood pressure regularly.  Follow up with your primary care provider for further management for high blood pressure.      Diagnosis: HLD (hyperlipidemia)  Assessment and Plan of Treatment: Continue cholesterol control medications. Continue a low salt/cholesterol diet. Follow up with your primary care doctor within 1 week of discharge for further management and monitoring of lipid and cholesterol panels. Please call to make an appointment      Diagnosis: Uncontrolled type 2 diabetes mellitus with hyperglycemia  Assessment and Plan of Treatment: Continue your medication regimen and a consistent carbohydrate diet (Meaning eating the same amount of carbohydrates at the same time each day). Monitor blood glucose levels throughout the day before meals and at bedtime. Record blood sugars and bring to outpatient providers appointment in order to be reviewed by your doctor for management modifications. If your sugars are more than 400 or less than 70 you should contact your PCP immediately. Monitor for signs/symptoms of low blood glucose, such as, dizziness, altered mental status, or cool/clammy skin. In addition, monitor for signs/symptoms of high blood glucose, such as, feeling hot, dry, fatigued, or with increased thirst/urination. Make regular podiatry appointments in order to have feet checked for wounds and uncontrolled toe nail growth to prevent infections, as well as, appointments with an ophthalmologist to monitor your vision.      Diagnosis: Dog bite  Assessment and Plan of Treatment: Continue medications as prescribed. Follow up with your primary care doctor for further evaluation and management. Please call to make an appointment within 1-2 weeks of discharge.       PRINCIPAL DISCHARGE DIAGNOSIS  Diagnosis: Wound infection  Assessment and Plan of Treatment: You came into the hospital with a dog bite on your left arm that became infected. You had an incision and drainage of the abscess within your wound. You were started on antibiotics by the infectious disease doctors and you will continue to take your antibiotics as prescribed for another 3 weeks following discharge. Please take your one time dose of fluconazole to make sure you don't have a yeast infection (vaginal candidiasis) that can often happen following taking antibiotics for a long period of time. Please follow up with your PCP and Infectious disease doctor, Dr. Mccurdy for further management in 1-2 weeks.      SECONDARY DISCHARGE DIAGNOSES  Diagnosis: Dog bite  Assessment and Plan of Treatment: With your dog bite, plastic surgery followed you thorough your stay. They advised to continue wearing your splint that was provided by occupational therapy to your left arm. Please continue to change your wound dressing three times a day with moistened gauze and wrap with kerlix. Please follow up with Dr. Patel, your plastic surgeon in 1 week.    Diagnosis: Uncontrolled type 2 diabetes mellitus with hyperglycemia  Assessment and Plan of Treatment: Continue your medication regimen and a consistent carbohydrate diet (Meaning eating the same amount of carbohydrates at the same time each day). Monitor blood glucose levels throughout the day before meals and at bedtime. Record blood sugars and bring to outpatient providers appointment in order to be reviewed by your doctor for management modifications. If your sugars are more than 400 or less than 70 you should contact your PCP immediately. Monitor for signs/symptoms of low blood glucose, such as, dizziness, altered mental status, or cool/clammy skin. In addition, monitor for signs/symptoms of high blood glucose, such as, feeling hot, dry, fatigued, or with increased thirst/urination. Make regular podiatry appointments in order to have feet checked for wounds and uncontrolled toe nail growth to prevent infections, as well as, appointments with an ophthalmologist to monitor your vision. Please follow up with your outpatient endocrinologist Dr. Abigail Hoffman for further management. The endocrinologists here recommended increasing your trulicity to 3 mg weekly as well as increasing your farxiga to 10 mg daily. They also discussed possibly switching your glimepiride to metformin extended release. Please discuss these changes with Dr. Hoffman.      Diagnosis: HTN (hypertension)  Assessment and Plan of Treatment: Continue blood pressure medication regimen as directed. Follow a low salt/cholesterol diet. Monitor for any visual changes, headaches or dizziness.  Monitor blood pressure regularly.  Follow up with your primary care provider for further management for high blood pressure.      Diagnosis: HLD (hyperlipidemia)  Assessment and Plan of Treatment: Continue cholesterol control medications. Continue a low salt/cholesterol diet. Follow up with your primary care doctor within 1 week of discharge for further management and monitoring of lipid and cholesterol panels. Please call to make an appointment

## 2021-11-23 NOTE — OCCUPATIONAL THERAPY INITIAL EVALUATION ADULT - ANTICIPATED DISCHARGE DISPOSITION, OT EVAL
Recommending home with outpatient hand therapy services. OT will continue to follow while inpatient./home w/ outpatient

## 2021-11-23 NOTE — PROGRESS NOTE ADULT - ASSESSMENT
51 y.o F w/ a hx of DM that was admitted for worsening cellulitis and infection secondary to a dog bite to the [facility]    Plan:  - OT consult  - Continue drainage with gauze and pressing/expressing 3x a day  - pack wound with moistened gauze and wrap with kerlix.   - f/u blood cultures  - IV abx - ID recs Ceftriaxone and flagyl  - care per primary team      00050 PRS

## 2021-11-23 NOTE — PROGRESS NOTE ADULT - TIME BILLING
agree with above  cv stable  HR improved, asymptomatic   likely infectious driven  echo with nl lv fxn  iv abx per primary team   plastics / med f/u, ID f/u  dvt ppx

## 2021-11-23 NOTE — OCCUPATIONAL THERAPY INITIAL EVALUATION ADULT - FINE MOTOR COORDINATION, LEFT HAND, MANIPULATION OF OBJECTS, OT EVAL
Patient is able to hold and utilize pencil for writing although reports weakness with gripping./normal performance

## 2021-11-23 NOTE — DISCHARGE NOTE PROVIDER - NSDCFUADDAPPT_GEN_ALL_CORE_FT
Please follow up with Dr. Patel, your plastic surgeon in 1 week for further wound care and management

## 2021-11-23 NOTE — DISCHARGE NOTE PROVIDER - NSDCFUADDINST_GEN_ALL_CORE_FT
Plastic surgery wound care instructions: Continue drainage with gauze and pressing/expressing 3x a day/pack wound with moistened gauze and wrap with kerlix.

## 2021-11-23 NOTE — PROGRESS NOTE ADULT - SUBJECTIVE AND OBJECTIVE BOX
Plastic Surgery    SUBJECTIVE: Pt seen and examined on rounds with team. No acute events overnight.        OBJECTIVE    PHYSICAL EXAM:   General: Alert, NAD  Respiratory: non-labored breathing  Extremities: Erythema and edema of left forearm improving. able to extend pip/dip, however unable to extend MCP with wrist extended. Sensation intact m/r/u distribution      VITALS  T(C): 37.2 (11-23-21 @ 06:05), Max: 37.2 (11-23-21 @ 06:05)  HR: 72 (11-23-21 @ 06:05) (72 - 86)  BP: 123/78 (11-23-21 @ 06:05) (112/68 - 123/78)  RR: 18 (11-23-21 @ 06:05) (18 - 18)  SpO2: 99% (11-23-21 @ 06:05) (99% - 100%)  CAPILLARY BLOOD GLUCOSE      POCT Blood Glucose.: 170 mg/dL (23 Nov 2021 07:30)  POCT Blood Glucose.: 195 mg/dL (22 Nov 2021 21:52)  POCT Blood Glucose.: 147 mg/dL (22 Nov 2021 17:30)  POCT Blood Glucose.: 167 mg/dL (22 Nov 2021 11:47)      Is/Os      MEDICATIONS (STANDING): atorvastatin 20 milliGRAM(s) Oral at bedtime  cefTRIAXone   IVPB 1000 milliGRAM(s) IV Intermittent every 24 hours  dextrose 40% Gel 15 Gram(s) Oral once  dextrose 5%. 1000 milliLiter(s) IV Continuous <Continuous>  dextrose 5%. 1000 milliLiter(s) IV Continuous <Continuous>  dextrose 50% Injectable 25 Gram(s) IV Push once  dextrose 50% Injectable 12.5 Gram(s) IV Push once  dextrose 50% Injectable 25 Gram(s) IV Push once  enoxaparin Injectable 40 milliGRAM(s) SubCutaneous at bedtime  glucagon  Injectable 1 milliGRAM(s) IntraMuscular once  influenza   Vaccine 0.5 milliLiter(s) IntraMuscular once  insulin glargine Injectable (LANTUS) 15 Unit(s) SubCutaneous at bedtime  insulin lispro (ADMELOG) corrective regimen sliding scale   SubCutaneous at bedtime  insulin lispro (ADMELOG) corrective regimen sliding scale   SubCutaneous three times a day before meals  insulin lispro Injectable (ADMELOG) 5 Unit(s) SubCutaneous three times a day before meals  lactobacillus acidophilus 1 Tablet(s) Oral three times a day with meals  metroNIDAZOLE  IVPB      metroNIDAZOLE  IVPB 500 milliGRAM(s) IV Intermittent every 8 hours  sodium chloride 0.9%. 1000 milliLiter(s) IV Continuous <Continuous>  valsartan 80 milliGRAM(s) Oral daily    MEDICATIONS (PRN):acetaminophen     Tablet .. 650 milliGRAM(s) Oral every 6 hours PRN Temp greater or equal to 38C (100.4F), Mild Pain (1 - 3), Moderate Pain (4 - 6)  HYDROmorphone  Injectable 0.5 milliGRAM(s) IV Push every 3 hours PRN Moderate Pain (4 - 6)  melatonin 3 milliGRAM(s) Oral at bedtime PRN Insomnia      LABS  CBC (11-23 @ 06:46)                              11.7                           7.60    )----------------(  423<H>     --    % Neutrophils, --    % Lymphocytes, ANC: --                                  37.5      BMP (11-23 @ 06:46)             138     |  102     |  12    		Ca++ --      Ca 9.5                ---------------------------------( 163<H>		Mg 2.10               4.2     |  27      |  0.69  			Ph 3.5       LFTs (11-23 @ 06:46)      TPro 8.3 / Alb 3.8 / TBili 0.3 / DBili -- / AST 14 / ALT 13 / AlkPhos 110              IMAGING STUDIES

## 2021-11-23 NOTE — DISCHARGE NOTE PROVIDER - HOSPITAL COURSE
50yo Female w/ MHx of HTN and Type 2 DM, who was bit on her left forearm by her neighbors dog (Vonda) on 11/12, she went to OSH was given tetanus shot and discharged on PO Doxycyline and topical Bacitracin, worsened and came to ED, admitted and started on IV antibiotics, seen and evaluated by plastics team for wound care. Seen and evaluated by OT and braced for tenosynovitis and myositis. Also being followed by infectious disease. Pt was being treated with IV Cefepime then switched to ceftriaxone and flagyl. Pt will be sent home on PO levaquin and PO flagyl for a total time of 4 weeks on antibiotics    Dispo:     On_________, case was discussed with __________, patient is medically cleared and optimized for discharge today. All medications were reviewed with attending, and sent to mutually agreed upon pharmacy.    51F PMHx HTN and T2DM who was bit by a dog 11/12 L arm went to OSH given tetanus shot and discharged on PO Doxycyline and topical Bacitracin. Despite taking antibiotics noted that left arm becoming increasingly swollen, warm, red and the wound w/ drainage w/ foul smell. Found to have purulent cellulitis and tenosynovitis of left arm    + LUE Cellulitis/tenosynovitis- IV Cefepime  + Tachycardia- likely 2/2 infxn, echo WNL      Dog bite to LUE w/ cellutlitis/tenosynovitis switched to ceftriaxone/flagyl per ID 11/21     - F/u BCx sent 11/19, abscess culture w/ Klebsiella, Ecoli, and enterococcus avium sensitive to ctx and flagyl   - can switch to po levaquin and flagyl to complete 4 weeks on discharge.     OT - LUE brace     Plastics following  - wear splint provided by OT  - Continue drainage with gauze and pressing/expressing 3x a day/pack wound with moistened gauze and wrap with kerlix.   - f/u with Dr. Patel in 1 week outpt    Dispo: Pending final wound recs by plastics      Endo  Discharge Plan:  Increase Farxiga to 10 mg daily. Continue Glimepiride 2 mg daily (consider switching to Metformin ER as outpatient). Suggest increasing to Trulicity 3 mg SQ weekly, if unable to get 3 mg dose can potentially take 2 injections (of the 1.5 mg dose), also discussed diet and lifestyle interventions to implement, starting now  Ensure she has glucometer, glucose test strips, lancets, alcohol swabs   Outpatient followup with Endocrinologist (Dr. Abigail Hoffman).    #Cellulitis left forearm -2/2 dog bite   s/p I&D   plastics : following   dressing change 3 times / day   c/w IV abx , today abx changed to rocephine and flagyl   ID following   clinically improving : afebrile now     #Sinus tachycardia  -HR improved    #HTN  on valsartan   controlled     DM-2 :  -hold oral meds   seen by endo   c/w lantus / FSBS     dvt ppx     dispo: OT consult called today , as per ID if plastics clear , plan for d/c on levaquin and flagyl for 4 weeks    51F PMHx HTN and T2DM who was bit by a dog 11/12 on her L arm went to OSH given tetanus shot and discharged on PO Doxycyline and topical Bacitracin. Despite taking antibiotics, patient's left arm was noted to have become increasingly swollen, warm, red and the wound w/ drainage w/ foul smell. Found to have purulent cellulitis and tenosynovitis of left arm.     1. Cellulitis of left forearm secondary to dog bite  - s/p I&D   - Patient followed by infectious disease throughout course of stay. Patient started on IV cefepime and was switched to IV Rocephin and IV flagyl  - Patient to be discharged per ID recommendations on Levaquin 500 mg daily and flagyl 500 mg q 8 hours for 3 more weeks  - Patient now afebrile  - Patient c/o of a "yeast infection". Per ID will discharge patient on a one time dose of diflucan 150 mg. Patient to follow up with PCP for further management in 1-2 weeks    2. Dog Bite wound  - Plastic surgery followed patient throughout course of stay and advised dressing changes TID  - Patient to wear LUE splint provided by OT  - Continue drainage with gauze and pressing/expressing 3x a day/pack wound with moistened gauze and wrap with kerlix.   - f/u with Dr. Patel in 1 week outpatient    3. T2DM  - Endocrine following patient throughout hospital course  - Endocrine advised patient to c/w Farxiga 10 mg daily, glimepiride 2 mg daily, and to consider switching to Metformin ER  with outpatient endocrinologist, Dr. Abigail Hoffman  - Endocrine advised patient to increase Trulicity to 3 mg SQ weekly outpatient with Dr. Hoffman. Per endocrine, if patient unable to get 3 mg dose, can potentially take 2 injections at once of the 1.5 mg dosage      Dispo: Home with [facility] care      Endo  Discharge Plan:  Increase Farxiga to 10 mg daily. Continue Glimepiride 2 mg daily (consider switching to Metformin ER as outpatient). Suggest increasing to Trulicity 3 mg SQ weekly, if unable to get 3 mg dose can potentially take 2 injections (of the 1.5 mg dose),  Ensure she has glucometer, glucose test strips, lancets, alcohol swabs        51F PMHx HTN and T2DM who was bit by a dog 11/12 on her L arm went to OSH given tetanus shot and discharged on PO Doxycyline and topical Bacitracin. Despite taking antibiotics, patient's left arm was noted to have become increasingly swollen, warm, red and the wound w/ drainage w/ foul smell. Found to have purulent cellulitis and tenosynovitis of left arm.     1. Cellulitis of left forearm secondary to dog bite  - s/p I&D   - Patient followed by infectious disease throughout course of stay. Patient started on IV cefepime and was switched to IV Rocephin and IV flagyl  - Patient to be discharged per ID recommendations on Levaquin 500 mg daily and flagyl 500 mg q 8 hours for 3 more weeks  - Patient now afebrile  - Per ID will discharge patient on a one time dose of diflucan 150 mg for prophylaxis against vaginal candidiasis. Patient to follow up with PCP and Infectious disease Dr. Mccurdy for further management in 1-2 weeks      2. Dog Bite wound  - Plastic surgery followed patient throughout course of stay and advised dressing changes TID  - Patient to wear LUE splint provided by OT  - Continue drainage with gauze and pressing/expressing 3x a day/pack wound with moistened gauze and wrap with kerlix.   - f/u with Dr. Patel, plastic surgeon in 1 week outpatient    3. T2DM  - Endocrine following patient throughout hospital course  - Endocrine advised patient to increase Farxiga dose from 5 to 10 mg daily, glimepiride 2 mg daily, and to consider switching to Metformin ER  with outpatient endocrinologist, Dr. Abigail Hoffman  - Endocrine advised patient to increase Trulicity from 1.5 mg to 3 mg SQ weekly outpatient with Dr. Hoffman. Per endocrine, if patient unable to get 3 mg dose, can potentially take 2 injections at once of the 1.5 mg dosage    Dispo: Home with [facility] home care    Patient seen and evaluated, no acute somatic complaints. Patient provided a note detailing her length of stay for purposes of documentation for her employer, Reviewed discharge medications with patient; All new medications requiring new prescriptions were sent to the pharmacy of patient's choice. Reviewed need for prescription for previous home medications and new prescriptions sent if requested. Medically cleared/stable for discharge as per Dr. Tolentino on 11/24/2021 with close outpatient follow up within 1-2 weeks of discharge. Patient understands and agrees with plan of care.

## 2021-11-23 NOTE — DISCHARGE NOTE PROVIDER - NSFOLLOWUPCLINICS_GEN_ALL_ED_FT
[facility] [place]  Internal Medicine  [address] [place] [address]  Phone: [phone]  Fax: [phone]

## 2021-11-23 NOTE — OCCUPATIONAL THERAPY INITIAL EVALUATION ADULT - MANUAL MUSCLE TESTING RESULTS, REHAB EVAL
Left UE and right shoulder/elbow grossly assessed to be at least 3/5. Right wrist and MPs of digits 2-5 grossly assessed to be 2-/5.

## 2021-11-23 NOTE — OCCUPATIONAL THERAPY INITIAL EVALUATION ADULT - LIGHT TOUCH SENSATION, LUE, REHAB EVAL
Patient reports variable numbness/tingling throughout bilateral UE 2/2 bilateral CTS, although remains able to distinguish between light touch./within normal limits

## 2021-11-23 NOTE — DISCHARGE NOTE PROVIDER - CARE PROVIDER_API CALL
Silverio Patel (MD)  Plastic Surgery; Surgery of the Hand  [address], [address]  [place] [address]  Phone: [phone]  Fax: [phone]  Follow Up Time: 1 week    Joan Samuel)  Infectious Disease; Internal Medicine  [address] [place] [address]  Phone: [phone]  Fax: [phone]  Follow Up Time: 1 week    Abigail Hoffman)  EndocrinologyMetabDiabetes  [address], [address]  [place] [address]  Phone: [phone]  Fax: [phone]  Follow Up Time: 1 week

## 2021-11-23 NOTE — OCCUPATIONAL THERAPY INITIAL EVALUATION ADULT - LIVES WITH, PROFILE
Patient lives with her son in an apartment with steps to manage or an elevator to access unit./children

## 2021-11-23 NOTE — PROGRESS NOTE ADULT - ASSESSMENT
52yo Female w/ MHx of HTN and Type 2 DM, who was bit on her left forearm by her neighbors dog (Vonda) on 11/12, she went to OSH was given tetanus shot and discharged on PO Doxycyline and topical Bacitracin    #Cellulitis left forearm -2/2 dog bite   s/p I&D   plastics : following   dressing change 3 times / day   c/w IV abx , today abx changed to rocephine and flagyl   ID following   clinically improving : afebrile now     #Sinus tachycardia  -HR improved    #HTN  on valsartan   controlled     DM-2 :  -hold oral meds   seen by endo   c/w lantus / FSBS     dvt ppx     dispo: OT consult called today , as per ID if plastics clear , plan for d/c on levaquin and flagyl for 4 weeks     radha pitts MD

## 2021-11-23 NOTE — PROGRESS NOTE ADULT - SUBJECTIVE AND OBJECTIVE BOX
Patient is a 51y old  Female who presents with a chief complaint of Arm swelling due to dog bite (23 Nov 2021 17:28)      INTERVAL HPI/OVERNIGHT EVENTS: doing fair , pain controlled     T(C): 36.8 (11-23-21 @ 09:32), Max: 37.2 (11-23-21 @ 06:05)  HR: 73 (11-23-21 @ 09:32) (72 - 86)  BP: 107/70 (11-23-21 @ 09:32) (107/70 - 123/78)  RR: 19 (11-23-21 @ 09:32) (18 - 19)  SpO2: 100% (11-23-21 @ 09:32) (99% - 100%)  Wt(kg): --  I&O's Summary      PAST MEDICAL & SURGICAL HISTORY:  HTN (hypertension)    Type 2 diabetes mellitus    No significant past surgical history        SOCIAL HISTORY  Alcohol:  Tobacco:  Illicit substance use:    FAMILY HISTORY:    REVIEW OF SYSTEMS:  CONSTITUTIONAL: No fever, weight loss, or fatigue  EYES: No eye pain, visual disturbances, or discharge  ENMT:  No difficulty hearing, tinnitus, vertigo; No sinus or throat pain  NECK: No pain or stiffness  RESPIRATORY: No cough, wheezing, chills or hemoptysis; No shortness of breath  CARDIOVASCULAR: No chest pain, palpitations, dizziness, or leg swelling  GASTROINTESTINAL: No abdominal or epigastric pain. No nausea, vomiting, or hematemesis; No diarrhea or constipation. No melena or hematochezia.  GENITOURINARY: No dysuria, frequency, hematuria, or incontinence  NEUROLOGICAL: No headaches, memory loss, loss of strength, numbness, or tremors  SKIN: No itching, burning, rashes, or lesions   LYMPH NODES: No enlarged glands  ENDOCRINE: No heat or cold intolerance; No hair loss  MUSCULOSKELETAL: No joint pain or swelling; No muscle, back, or extremity pain  PSYCHIATRIC: No depression, anxiety, mood swings, or difficulty sleeping  HEME/LYMPH: No easy bruising, or bleeding gums  ALLERY AND IMMUNOLOGIC: No hives or eczema    RADIOLOGY & ADDITIONAL TESTS:    Imaging Personally Reviewed:  [ ] YES  [ ] NO    Consultant(s) Notes Reviewed:  [ ] YES  [ ] NO    PHYSICAL EXAM:  GENERAL: NAD, well-groomed, well-developed  HEAD:  Atraumatic, Normocephalic  EYES: EOMI, PERRLA, conjunctiva and sclera clear  ENMT: No tonsillar erythema, exudates, or enlargement; Moist mucous membranes, Good dentition, No lesions  NECK: Supple, No JVD, Normal thyroid  NERVOUS SYSTEM:  Alert & Oriented X3, Good concentration; Motor Strength 5/5 B/L upper and lower extremities; DTRs 2+ intact and symmetric  CHEST/LUNG: Clear to percussion bilaterally; No rales, rhonchi, wheezing, or rubs  HEART: Regular rate and rhythm; No murmurs, rubs, or gallops  ABDOMEN: Soft, Nontender, Nondistended; Bowel sounds present  EXTREMITIES:  2+ Peripheral Pulses, No clubbing, cyanosis, or edema  LYMPH: No lymphadenopathy noted  SKIN: No rashes or lesions    LABS:                        11.7   7.60  )-----------( 423      ( 23 Nov 2021 06:46 )             37.5     11-23    138  |  102  |  12  ----------------------------<  163<H>  4.2   |  27  |  0.69    Ca    9.5      23 Nov 2021 06:46  Phos  3.5     11-23  Mg     2.10     11-23    TPro  8.3  /  Alb  3.8  /  TBili  0.3  /  DBili  x   /  AST  14  /  ALT  13  /  AlkPhos  110  11-23        CAPILLARY BLOOD GLUCOSE      POCT Blood Glucose.: 193 mg/dL (23 Nov 2021 16:43)  POCT Blood Glucose.: 164 mg/dL (23 Nov 2021 11:41)  POCT Blood Glucose.: 170 mg/dL (23 Nov 2021 07:30)  POCT Blood Glucose.: 195 mg/dL (22 Nov 2021 21:52)            MEDICATIONS  (STANDING):  atorvastatin 20 milliGRAM(s) Oral at bedtime  BACItracin   Ointment 1 Application(s) Topical three times a day  cefTRIAXone   IVPB 1000 milliGRAM(s) IV Intermittent every 24 hours  dextrose 40% Gel 15 Gram(s) Oral once  dextrose 5%. 1000 milliLiter(s) (50 mL/Hr) IV Continuous <Continuous>  dextrose 5%. 1000 milliLiter(s) (100 mL/Hr) IV Continuous <Continuous>  dextrose 50% Injectable 25 Gram(s) IV Push once  dextrose 50% Injectable 12.5 Gram(s) IV Push once  dextrose 50% Injectable 25 Gram(s) IV Push once  enoxaparin Injectable 40 milliGRAM(s) SubCutaneous at bedtime  glucagon  Injectable 1 milliGRAM(s) IntraMuscular once  influenza   Vaccine 0.5 milliLiter(s) IntraMuscular once  insulin glargine Injectable (LANTUS) 15 Unit(s) SubCutaneous at bedtime  insulin lispro (ADMELOG) corrective regimen sliding scale   SubCutaneous at bedtime  insulin lispro (ADMELOG) corrective regimen sliding scale   SubCutaneous three times a day before meals  insulin lispro Injectable (ADMELOG) 5 Unit(s) SubCutaneous three times a day before meals  lactobacillus acidophilus 1 Tablet(s) Oral three times a day with meals  metroNIDAZOLE  IVPB      metroNIDAZOLE  IVPB 500 milliGRAM(s) IV Intermittent every 8 hours  sodium chloride 0.9%. 1000 milliLiter(s) (100 mL/Hr) IV Continuous <Continuous>  valsartan 80 milliGRAM(s) Oral daily    MEDICATIONS  (PRN):  acetaminophen     Tablet .. 650 milliGRAM(s) Oral every 6 hours PRN Temp greater or equal to 38C (100.4F), Mild Pain (1 - 3), Moderate Pain (4 - 6)  HYDROmorphone  Injectable 0.5 milliGRAM(s) IV Push every 3 hours PRN Moderate Pain (4 - 6)  melatonin 3 milliGRAM(s) Oral at bedtime PRN Insomnia      Care Discussed with Consultants/Other Providers [ ] YES  [ ] NO

## 2021-11-23 NOTE — OCCUPATIONAL THERAPY INITIAL EVALUATION ADULT - LIGHT TOUCH SENSATION, RUE, REHAB EVAL
Patient reports variable numbness/tingling throughout bilateral UE 2/2 bilateral CTS, although remains able to distinguish between light touch/within normal limits

## 2021-11-23 NOTE — OCCUPATIONAL THERAPY INITIAL EVALUATION ADULT - GENERAL OBSERVATIONS, REHAB EVAL
Patient received semisupine in bed in NAD, +left UE volar wrist brace, and was agreeable to participate in OT evaluation

## 2021-11-23 NOTE — DISCHARGE NOTE PROVIDER - NSDCMRMEDTOKEN_GEN_ALL_CORE_FT
amLODIPine 5 mg oral tablet: 1 tab(s) orally once a day  bacitracin 500 units/g topical ointment: Apply topically to affected area 2 times a day  Farxiga 5 mg oral tablet: 1 tab(s) orally once a day  glimepiride 2 mg oral tablet: 1 tab(s) orally once a day  Trulicity Pen 1.5 mg/0.5 mL subcutaneous solution: 1 dose(s) subcutaneous once a week on saturday  Tylenol 500 mg oral tablet: 1 tab(s) orally every 6 hours, As Needed  valsartan 160 mg oral capsule: 1 cap(s) orally once a day   amLODIPine 5 mg oral tablet: 1 tab(s) orally once a day  atorvastatin 20 mg oral tablet: 1 tab(s) orally once a day (at bedtime)  bacitracin 500 units/g topical ointment: Apply topically to affected area 2 times a day  Diflucan 150 mg oral tablet: 1 tab(s) orally once a day   Farxiga 10 mg oral tablet: 1 tab(s) orally once a day   Flagyl 500 mg oral tablet: 1 tab(s) orally 3 times a day   glimepiride 2 mg oral tablet: 1 tab(s) orally once a day  levoFLOXacin 500 mg oral tablet: 1 tab(s) orally once a day   Trulicity Pen 3 mg/0.5 mL subcutaneous solution: 3 milligram(s) subcutaneously every 7 days   Tylenol 500 mg oral tablet: 1 tab(s) orally every 6 hours, As Needed  valsartan 80 mg oral tablet: 1 tab(s) orally once a day

## 2021-11-23 NOTE — OCCUPATIONAL THERAPY INITIAL EVALUATION ADULT - RANGE OF MOTION EXAMINATION, UPPER EXTREMITY
Right shoulder and elbow active flexion to WFL, right wrist active extension to 35 degrees and flexion to WFL. Right digits able to form tight composite fist. Right digits 2-5 active DIP and PIP extension to WFL, unable to actively extend MPs of digits 2-5./Left UE Active ROM was WFL (within functional limits)

## 2021-11-23 NOTE — PROGRESS NOTE ADULT - ASSESSMENT
a/p  50yo Female w/ MHx of HTN and Type 2 DM, who was bit on her left forearm by her neighbors dog (Vonda) on 11/12, she went to OSH was given tetanus shot and discharged on PO Doxycyline and topical Bacitracin    #Sinus tachycardia  -HR improved, asymptomatic   -likely infectious driven  -echo with nl lv fxn    #Cellulitis   -2/2 dog bite   -CT UE noted  -s/p I&D  -wound culture noted   -iv abx per primary team   -plastics / med f/u, ID f/u    #HTN  -sbp stable  -hold norvasc   -cont lower dose valsartan 80 mg QD   -cont to monitor     dvt ppx

## 2021-11-23 NOTE — DISCHARGE NOTE PROVIDER - CARE PROVIDERS DIRECT ADDRESSES
,DirectAddress_Unknown,[email or web address],DirectAddress_Unknown

## 2021-11-23 NOTE — OCCUPATIONAL THERAPY INITIAL EVALUATION ADULT - PERTINENT HX OF CURRENT PROBLEM, REHAB EVAL
Patient is a 51 year old female with history of HTN and Type 2 DM. Presents following dog bite on left UE and worsening cellulitis.

## 2021-11-23 NOTE — DISCHARGE NOTE PROVIDER - PROVIDER TOKENS
PROVIDER:[TOKEN:[[id number]],FOLLOWUP:[1 week]],PROVIDER:[TOKEN:[[id number]],FOLLOWUP:[1 week]],PROVIDER:[TOKEN:[[id number]],FOLLOWUP:[1 week]]

## 2021-11-23 NOTE — PROGRESS NOTE ADULT - SUBJECTIVE AND OBJECTIVE BOX
CARDIOLOGY FOLLOW UP - Dr. David  DATE OF SERVICE: 11/23/21    CC no cp or sob       REVIEW OF SYSTEMS:  CONSTITUTIONAL: No fever, weight loss, or fatigue  RESPIRATORY: No cough, wheezing, chills or hemoptysis; No Shortness of Breath  CARDIOVASCULAR: No chest pain, palpitations, passing out, dizziness, or leg swelling  GASTROINTESTINAL: No abdominal or epigastric pain. No nausea, vomiting, or hematemesis; No diarrhea or constipation. No melena or hematochezia.  VASCULAR: No edema     PHYSICAL EXAM:  T(C): 36.8 (11-23-21 @ 09:32), Max: 37.2 (11-23-21 @ 06:05)  HR: 73 (11-23-21 @ 09:32) (72 - 86)  BP: 107/70 (11-23-21 @ 09:32) (107/70 - 123/78)  RR: 19 (11-23-21 @ 09:32) (18 - 19)  SpO2: 100% (11-23-21 @ 09:32) (99% - 100%)  Wt(kg): --  I&O's Summary      Appearance: Normal	  Cardiovascular: Normal S1 S2,RRR, No JVD, No murmurs  Respiratory: Lungs clear to auscultation	  Gastrointestinal:  Soft, Non-tender, + BS	  Extremities: Normal range of motion, No clubbing, cyanosis or edema      Home Medications:  amLODIPine 5 mg oral tablet: 1 tab(s) orally once a day (18 Nov 2021 15:13)  bacitracin 500 units/g topical ointment: Apply topically to affected area 2 times a day (18 Nov 2021 15:13)  Farxiga 5 mg oral tablet: 1 tab(s) orally once a day (18 Nov 2021 15:13)  glimepiride 2 mg oral tablet: 1 tab(s) orally once a day (18 Nov 2021 15:13)  Trulicity Pen 1.5 mg/0.5 mL subcutaneous solution: 1 dose(s) subcutaneous once a week on saturday (18 Nov 2021 15:13)  Tylenol 500 mg oral tablet: 1 tab(s) orally every 6 hours, As Needed (18 Nov 2021 15:13)  valsartan 160 mg oral capsule: 1 cap(s) orally once a day (18 Nov 2021 15:13)      MEDICATIONS  (STANDING):  atorvastatin 20 milliGRAM(s) Oral at bedtime  BACItracin   Ointment 1 Application(s) Topical three times a day  cefTRIAXone   IVPB 1000 milliGRAM(s) IV Intermittent every 24 hours  dextrose 40% Gel 15 Gram(s) Oral once  dextrose 5%. 1000 milliLiter(s) (50 mL/Hr) IV Continuous <Continuous>  dextrose 5%. 1000 milliLiter(s) (100 mL/Hr) IV Continuous <Continuous>  dextrose 50% Injectable 25 Gram(s) IV Push once  dextrose 50% Injectable 12.5 Gram(s) IV Push once  dextrose 50% Injectable 25 Gram(s) IV Push once  enoxaparin Injectable 40 milliGRAM(s) SubCutaneous at bedtime  glucagon  Injectable 1 milliGRAM(s) IntraMuscular once  influenza   Vaccine 0.5 milliLiter(s) IntraMuscular once  insulin glargine Injectable (LANTUS) 15 Unit(s) SubCutaneous at bedtime  insulin lispro (ADMELOG) corrective regimen sliding scale   SubCutaneous at bedtime  insulin lispro (ADMELOG) corrective regimen sliding scale   SubCutaneous three times a day before meals  insulin lispro Injectable (ADMELOG) 5 Unit(s) SubCutaneous three times a day before meals  lactobacillus acidophilus 1 Tablet(s) Oral three times a day with meals  metroNIDAZOLE  IVPB      metroNIDAZOLE  IVPB 500 milliGRAM(s) IV Intermittent every 8 hours  sodium chloride 0.9%. 1000 milliLiter(s) (100 mL/Hr) IV Continuous <Continuous>  valsartan 80 milliGRAM(s) Oral daily      TELEMETRY: 	    ECG:  	  RADIOLOGY:   DIAGNOSTIC TESTING:  [ ] Echocardiogram:  [ ]  Catheterization:  [ ] Stress Test:    OTHER: 	    LABS:	 	    Creatine Kinase, Serum: 169 U/L [25 - 170] (11-19 @ 07:07)  Creatine Kinase, Serum: 320 U/L [25 - 170] (11-18 @ 07:28)                          11.7   7.60  )-----------( 423      ( 23 Nov 2021 06:46 )             37.5     11-23    138  |  102  |  12  ----------------------------<  163<H>  4.2   |  27  |  0.69    Ca    9.5      23 Nov 2021 06:46  Phos  3.5     11-23  Mg     2.10     11-23    TPro  8.3  /  Alb  3.8  /  TBili  0.3  /  DBili  x   /  AST  14  /  ALT  13  /  AlkPhos  110  11-23

## 2021-11-24 ENCOUNTER — TRANSCRIPTION ENCOUNTER (OUTPATIENT)
Age: 51
End: 2021-11-24

## 2021-11-24 VITALS
OXYGEN SATURATION: 100 % | RESPIRATION RATE: 18 BRPM | DIASTOLIC BLOOD PRESSURE: 72 MMHG | HEART RATE: 84 BPM | TEMPERATURE: 98 F | SYSTOLIC BLOOD PRESSURE: 113 MMHG

## 2021-11-24 LAB
ALBUMIN SERPL ELPH-MCNC: 3.4 G/DL — SIGNIFICANT CHANGE UP (ref 3.3–5)
ALP SERPL-CCNC: 102 U/L — SIGNIFICANT CHANGE UP (ref 40–120)
ALT FLD-CCNC: 19 U/L — SIGNIFICANT CHANGE UP (ref 4–33)
ANION GAP SERPL CALC-SCNC: 8 MMOL/L — SIGNIFICANT CHANGE UP (ref 7–14)
AST SERPL-CCNC: 26 U/L — SIGNIFICANT CHANGE UP (ref 4–32)
BILIRUB SERPL-MCNC: 0.2 MG/DL — SIGNIFICANT CHANGE UP (ref 0.2–1.2)
BUN SERPL-MCNC: 12 MG/DL — SIGNIFICANT CHANGE UP (ref 7–23)
CALCIUM SERPL-MCNC: 9.1 MG/DL — SIGNIFICANT CHANGE UP (ref 8.4–10.5)
CHLORIDE SERPL-SCNC: 102 MMOL/L — SIGNIFICANT CHANGE UP (ref 98–107)
CO2 SERPL-SCNC: 28 MMOL/L — SIGNIFICANT CHANGE UP (ref 22–31)
CREAT SERPL-MCNC: 0.69 MG/DL — SIGNIFICANT CHANGE UP (ref 0.5–1.3)
CULTURE RESULTS: SIGNIFICANT CHANGE UP
CULTURE RESULTS: SIGNIFICANT CHANGE UP
GLUCOSE BLDC GLUCOMTR-MCNC: 124 MG/DL — HIGH (ref 70–99)
GLUCOSE BLDC GLUCOMTR-MCNC: 147 MG/DL — HIGH (ref 70–99)
GLUCOSE BLDC GLUCOMTR-MCNC: 189 MG/DL — HIGH (ref 70–99)
GLUCOSE SERPL-MCNC: 155 MG/DL — HIGH (ref 70–99)
HCT VFR BLD CALC: 36 % — SIGNIFICANT CHANGE UP (ref 34.5–45)
HGB BLD-MCNC: 11.7 G/DL — SIGNIFICANT CHANGE UP (ref 11.5–15.5)
MAGNESIUM SERPL-MCNC: 2 MG/DL — SIGNIFICANT CHANGE UP (ref 1.6–2.6)
MCHC RBC-ENTMCNC: 28.6 PG — SIGNIFICANT CHANGE UP (ref 27–34)
MCHC RBC-ENTMCNC: 32.5 GM/DL — SIGNIFICANT CHANGE UP (ref 32–36)
MCV RBC AUTO: 88 FL — SIGNIFICANT CHANGE UP (ref 80–100)
NRBC # BLD: 0 /100 WBCS — SIGNIFICANT CHANGE UP
NRBC # FLD: 0 K/UL — SIGNIFICANT CHANGE UP
PHOSPHATE SERPL-MCNC: 3.6 MG/DL — SIGNIFICANT CHANGE UP (ref 2.5–4.5)
PLATELET # BLD AUTO: 353 K/UL — SIGNIFICANT CHANGE UP (ref 150–400)
POTASSIUM SERPL-MCNC: 4.1 MMOL/L — SIGNIFICANT CHANGE UP (ref 3.5–5.3)
POTASSIUM SERPL-SCNC: 4.1 MMOL/L — SIGNIFICANT CHANGE UP (ref 3.5–5.3)
PROT SERPL-MCNC: 7 G/DL — SIGNIFICANT CHANGE UP (ref 6–8.3)
RBC # BLD: 4.09 M/UL — SIGNIFICANT CHANGE UP (ref 3.8–5.2)
RBC # FLD: 12.7 % — SIGNIFICANT CHANGE UP (ref 10.3–14.5)
SARS-COV-2 RNA SPEC QL NAA+PROBE: SIGNIFICANT CHANGE UP
SODIUM SERPL-SCNC: 138 MMOL/L — SIGNIFICANT CHANGE UP (ref 135–145)
SPECIMEN SOURCE: SIGNIFICANT CHANGE UP
SPECIMEN SOURCE: SIGNIFICANT CHANGE UP
WBC # BLD: 7.66 K/UL — SIGNIFICANT CHANGE UP (ref 3.8–10.5)
WBC # FLD AUTO: 7.66 K/UL — SIGNIFICANT CHANGE UP (ref 3.8–10.5)

## 2021-11-24 PROCEDURE — 99232 SBSQ HOSP IP/OBS MODERATE 35: CPT

## 2021-11-24 RX ORDER — FLUCONAZOLE 150 MG/1
1 TABLET ORAL
Qty: 1 | Refills: 0
Start: 2021-11-24 | End: 2021-11-24

## 2021-11-24 RX ORDER — DULAGLUTIDE 4.5 MG/.5ML
3 INJECTION, SOLUTION SUBCUTANEOUS
Qty: 2 | Refills: 0
Start: 2021-11-24 | End: 2021-12-23

## 2021-11-24 RX ORDER — GLIMEPIRIDE 1 MG
1 TABLET ORAL
Qty: 0 | Refills: 0 | DISCHARGE

## 2021-11-24 RX ORDER — VALSARTAN 80 MG/1
1 TABLET ORAL
Qty: 30 | Refills: 0
Start: 2021-11-24 | End: 2021-12-23

## 2021-11-24 RX ORDER — METRONIDAZOLE 500 MG
1 TABLET ORAL
Qty: 63 | Refills: 0
Start: 2021-11-24 | End: 2021-12-14

## 2021-11-24 RX ORDER — AMLODIPINE BESYLATE 2.5 MG/1
1 TABLET ORAL
Qty: 30 | Refills: 0
Start: 2021-11-24 | End: 2021-12-23

## 2021-11-24 RX ORDER — GLIMEPIRIDE 1 MG
1 TABLET ORAL
Qty: 30 | Refills: 0
Start: 2021-11-24 | End: 2021-12-23

## 2021-11-24 RX ORDER — DULAGLUTIDE 4.5 MG/.5ML
1 INJECTION, SOLUTION SUBCUTANEOUS
Qty: 0 | Refills: 0 | DISCHARGE

## 2021-11-24 RX ORDER — DAPAGLIFLOZIN 10 MG/1
1 TABLET, FILM COATED ORAL
Qty: 0 | Refills: 0 | DISCHARGE

## 2021-11-24 RX ORDER — DAPAGLIFLOZIN 10 MG/1
1 TABLET, FILM COATED ORAL
Qty: 30 | Refills: 0
Start: 2021-11-24 | End: 2021-12-23

## 2021-11-24 RX ORDER — ATORVASTATIN CALCIUM 80 MG/1
1 TABLET, FILM COATED ORAL
Qty: 30 | Refills: 0
Start: 2021-11-24 | End: 2021-12-23

## 2021-11-24 RX ORDER — VALSARTAN 80 MG/1
1 TABLET ORAL
Qty: 0 | Refills: 0 | DISCHARGE

## 2021-11-24 RX ORDER — AMLODIPINE BESYLATE 2.5 MG/1
1 TABLET ORAL
Qty: 0 | Refills: 0 | DISCHARGE

## 2021-11-24 RX ADMIN — Medication 5 UNIT(S): at 17:29

## 2021-11-24 RX ADMIN — Medication 5 UNIT(S): at 12:31

## 2021-11-24 RX ADMIN — Medication 1 TABLET(S): at 17:30

## 2021-11-24 RX ADMIN — Medication 5 UNIT(S): at 07:47

## 2021-11-24 RX ADMIN — Medication 100 MILLIGRAM(S): at 15:11

## 2021-11-24 RX ADMIN — Medication 1: at 17:29

## 2021-11-24 RX ADMIN — Medication 1 APPLICATION(S): at 05:36

## 2021-11-24 RX ADMIN — Medication 100 MILLIGRAM(S): at 05:35

## 2021-11-24 RX ADMIN — Medication 1 APPLICATION(S): at 15:12

## 2021-11-24 RX ADMIN — Medication 1 TABLET(S): at 12:32

## 2021-11-24 RX ADMIN — Medication 1 TABLET(S): at 07:47

## 2021-11-24 NOTE — PROGRESS NOTE ADULT - SUBJECTIVE AND OBJECTIVE BOX
Plastic Surgery    SUBJECTIVE: Pt seen and examined on rounds with team. No acute events overnight.        OBJECTIVE    PHYSICAL EXAM:   General: Alert, NAD  Respiratory: non-labored breathing  Extremities: Erythema and edema of left forearm improving. able to extend pip/dip, however unable to extend MCP with wrist extended. Sensation intact m/r/u distribution      VITALS  T(C): 36.6 (11-24-21 @ 05:15), Max: 37 (11-23-21 @ 17:20)  HR: 70 (11-24-21 @ 05:15) (70 - 80)  BP: 102/60 (11-24-21 @ 05:15) (102/60 - 123/80)  RR: 18 (11-24-21 @ 05:15) (18 - 19)  SpO2: 100% (11-24-21 @ 05:15) (100% - 100%)  CAPILLARY BLOOD GLUCOSE      POCT Blood Glucose.: 147 mg/dL (24 Nov 2021 07:40)  POCT Blood Glucose.: 193 mg/dL (23 Nov 2021 21:49)  POCT Blood Glucose.: 193 mg/dL (23 Nov 2021 16:43)  POCT Blood Glucose.: 164 mg/dL (23 Nov 2021 11:41)      Is/Os      MEDICATIONS (STANDING): atorvastatin 20 milliGRAM(s) Oral at bedtime  cefTRIAXone   IVPB 1000 milliGRAM(s) IV Intermittent every 24 hours  dextrose 40% Gel 15 Gram(s) Oral once  dextrose 5%. 1000 milliLiter(s) IV Continuous <Continuous>  dextrose 5%. 1000 milliLiter(s) IV Continuous <Continuous>  dextrose 50% Injectable 25 Gram(s) IV Push once  dextrose 50% Injectable 12.5 Gram(s) IV Push once  dextrose 50% Injectable 25 Gram(s) IV Push once  enoxaparin Injectable 40 milliGRAM(s) SubCutaneous at bedtime  glucagon  Injectable 1 milliGRAM(s) IntraMuscular once  influenza   Vaccine 0.5 milliLiter(s) IntraMuscular once  insulin glargine Injectable (LANTUS) 15 Unit(s) SubCutaneous at bedtime  insulin lispro (ADMELOG) corrective regimen sliding scale   SubCutaneous at bedtime  insulin lispro (ADMELOG) corrective regimen sliding scale   SubCutaneous three times a day before meals  insulin lispro Injectable (ADMELOG) 5 Unit(s) SubCutaneous three times a day before meals  lactobacillus acidophilus 1 Tablet(s) Oral three times a day with meals  metroNIDAZOLE  IVPB      metroNIDAZOLE  IVPB 500 milliGRAM(s) IV Intermittent every 8 hours  sodium chloride 0.9%. 1000 milliLiter(s) IV Continuous <Continuous>  valsartan 80 milliGRAM(s) Oral daily    MEDICATIONS (PRN):acetaminophen     Tablet .. 650 milliGRAM(s) Oral every 6 hours PRN Temp greater or equal to 38C (100.4F), Mild Pain (1 - 3), Moderate Pain (4 - 6)  HYDROmorphone  Injectable 0.5 milliGRAM(s) IV Push every 3 hours PRN Moderate Pain (4 - 6)  melatonin 3 milliGRAM(s) Oral at bedtime PRN Insomnia      LABS  CBC (11-23 @ 06:46)                              11.7                           7.60    )----------------(  423<H>     --    % Neutrophils, --    % Lymphocytes, ANC: --                                  37.5      BMP (11-23 @ 06:46)             138     |  102     |  12    		Ca++ --      Ca 9.5                ---------------------------------( 163<H>		Mg 2.10               4.2     |  27      |  0.69  			Ph 3.5       LFTs (11-23 @ 06:46)      TPro 8.3 / Alb 3.8 / TBili 0.3 / DBili -- / AST 14 / ALT 13 / AlkPhos 110              IMAGING STUDIES

## 2021-11-24 NOTE — PROGRESS NOTE ADULT - ASSESSMENT
51 years old female with PMHx of HTN and Type 2 DM, who was bit on her left forearm by her neighbors dog (Vonda) on 11/12, presented with worsening cellulitis    Unprovoked bite, owner is unclear about dog's vaccination status  Animal control is following, continue to monitor health of dog  Received tetanus and PO doxy at [place], but cellulitis progressed   CT left arm showed fluid collection within tendon sheath, consistent with tenosynovitis of common extensor tendon; 2 foreign bodies, but no subcutaneous abscess  Abscess Cx 11/18 E. coli, Klebsiella, E. avium and anaerobes.    Antibiotic:  Cipro 11/17  Clindamycin 11/17 - 11/19  Levaquin 11/18 - 11/19  Cefepime 11/19 ->11/21  Ceftriaxone 11/21->  Flagyl 11/21 ->      No clinical signs of septic arthritis of left elbow  Clinically improving      IMPRESSION:  Left forearm tenosynovitis and myositis  Foreign body in left arm  positive culture finding, polymicrobial infection.   cellulitis/ wound infection improving   vaginal candidiasis       RECOMMENDATIONS:  - c/w ceftriaxone and flagyl while inpt.   - tolerating abx well.   - F/U blood cultures, NTD   - Plastic following  - c/w wound care   - glycemic control for optimal wound healing   - f/u with animal control regarding the health of the dog  - can switch to po levaquin and flagyl to complete 4 weeks on discharge until 12/15/21.   - fluconazole 150 mg x 1 dose for vaginal candidiasis.   pt to follow with me as outpt    Plan discussed with Medicine PA    Will sign off, please call with questions.     Joan Mccurdy  Pager: [phone]. If no response or past 5 pm call [phone].

## 2021-11-24 NOTE — PROGRESS NOTE ADULT - SUBJECTIVE AND OBJECTIVE BOX
51y old  Female who presents with a chief complaint of Arm swelling due to dog bite (24 Nov 2021 13:35)      Interval history:  Afebrile, arm doing better each day still unable to extend the hand spontaneously.       Allergies:   penicillins (Hives; Urticaria)    Antimicrobials:  cefTRIAXone   IVPB 1000 milliGRAM(s) IV Intermittent every 24 hours    metroNIDAZOLE  IVPB 500 milliGRAM(s) IV Intermittent every 8 hours      REVIEW OF SYSTEMS:  No chest pain   No SOB  No N/V  No dysuria  No rash.       Vital Signs Last 24 Hrs  T(C): 36.6 (11-24-21 @ 05:15), Max: 37 (11-23-21 @ 17:20)  T(F): 97.8 (11-24-21 @ 05:15), Max: 98.6 (11-23-21 @ 17:20)  HR: 70 (11-24-21 @ 05:15) (70 - 80)  BP: 102/60 (11-24-21 @ 05:15) (102/60 - 123/80)  BP(mean): --  RR: 18 (11-24-21 @ 05:15) (18 - 18)  SpO2: 100% (11-24-21 @ 05:15) (100% - 100%)      PHYSICAL EXAM:  Patient in no acute distress. AAOX3.  No icterus, no oral ulcers.  Cardiovascular: S1S2 normal.  Lungs: + air entry B/L lung fields.  Gastrointestinal: soft, nontender, nondistended.  Extremities: no edema. Lt forearm with dressing, unable to extend fingers spontaneously.   IV sites not inflamed.                             11.7   7.66  )-----------( 353      ( 24 Nov 2021 07:26 )             36.0   11-24    138  |  102  |  12  ----------------------------<  155<H>  4.1   |  28  |  0.69    Ca    9.1      24 Nov 2021 07:26  Phos  3.6     11-24  Mg     2.00     11-24    TPro  7.0  /  Alb  3.4  /  TBili  0.2  /  DBili  x   /  AST  26  /  ALT  19  /  AlkPhos  102  11-24      LIVER FUNCTIONS - ( 24 Nov 2021 07:26 )  Alb: 3.4 g/dL / Pro: 7.0 g/dL / ALK PHOS: 102 U/L / ALT: 19 U/L / AST: 26 U/L / GGT: x

## 2021-11-24 NOTE — PROGRESS NOTE ADULT - ASSESSMENT
51 y.o F w/ a hx of DM that was admitted for worsening cellulitis and infection secondary to a dog bite to the [facility]    Plan:  - OT consult  - Continue drainage with gauze and pressing/expressing 3x a day  - pack wound with moistened gauze and wrap with kerlix.   - f/u blood cultures  - IV abx - ID recs Ceftriaxone and flagyl  - care per primary team  - d/c at discretion of primary team      [id number] PRS 51 y.o F w/ a hx of DM that was admitted for worsening cellulitis and infection secondary to a dog bite to the [place]    Plan:  - Wear splint provided by OT   - Continue drainage with gauze and pressing/expressing 3x a day  - pack wound with moistened gauze and wrap with kerlix.   - f/u blood cultures  - IV abx - ID recs Ceftriaxone and flagyl  - care per primary team  - d/c at discretion of primary team      [id number] PRS

## 2021-11-24 NOTE — DISCHARGE NOTE NURSING/CASE MANAGEMENT/SOCIAL WORK - PATIENT PORTAL LINK FT
You can access the FollowMyHealth Patient Portal offered by [facility] by registering at the following website: [email or web address]. By joining [employer]’s FollowMyHealth portal, you will also be able to view your health information using other applications (apps) compatible with our system.

## 2021-11-24 NOTE — PROGRESS NOTE ADULT - SUBJECTIVE AND OBJECTIVE BOX
DIABETES FOLLOW UP : Seen earlier today    INTERVAL HX: pt seen at bedside, tolerating po, inquiring about icecream alternatives at home, discussed sugar free and diet icecreams pt plans to implement diet and lifestyle changes after discharge , she shes a nutritionist as outpatient. BG at goal past 24 hours tolerating PO       Review of Systems:  General: As above  Cardiovascular: No chest pain, palpitations  Respiratory: No SOB, no cough  GI: No nausea, vomiting, abdominal pain  Endocrine: no polyuria, polydipsia or S&Sx of hypoglycemia    Allergies    penicillins (Hives; Urticaria)    Intolerances      MEDICATIONS  (STANDING):  atorvastatin 20 milliGRAM(s) Oral at bedtime  BACItracin   Ointment 1 Application(s) Topical three times a day  cefTRIAXone   IVPB 1000 milliGRAM(s) IV Intermittent every 24 hours  dextrose 40% Gel 15 Gram(s) Oral once  dextrose 5%. 1000 milliLiter(s) (50 mL/Hr) IV Continuous <Continuous>  dextrose 5%. 1000 milliLiter(s) (100 mL/Hr) IV Continuous <Continuous>  dextrose 50% Injectable 25 Gram(s) IV Push once  dextrose 50% Injectable 12.5 Gram(s) IV Push once  dextrose 50% Injectable 25 Gram(s) IV Push once  enoxaparin Injectable 40 milliGRAM(s) SubCutaneous at bedtime  glucagon  Injectable 1 milliGRAM(s) IntraMuscular once  influenza   Vaccine 0.5 milliLiter(s) IntraMuscular once  insulin glargine Injectable (LANTUS) 15 Unit(s) SubCutaneous at bedtime  insulin lispro (ADMELOG) corrective regimen sliding scale   SubCutaneous at bedtime  insulin lispro (ADMELOG) corrective regimen sliding scale   SubCutaneous three times a day before meals  insulin lispro Injectable (ADMELOG) 5 Unit(s) SubCutaneous three times a day before meals  lactobacillus acidophilus 1 Tablet(s) Oral three times a day with meals  metroNIDAZOLE  IVPB      metroNIDAZOLE  IVPB 500 milliGRAM(s) IV Intermittent every 8 hours  sodium chloride 0.9%. 1000 milliLiter(s) (100 mL/Hr) IV Continuous <Continuous>  valsartan 80 milliGRAM(s) Oral daily      PHYSICAL EXAM:  VITALS: T(C): 36.6 (11-24-21 @ 05:15)  T(F): 97.8 (11-24-21 @ 05:15), Max: 98.6 (11-23-21 @ 17:20)  HR: 70 (11-24-21 @ 05:15) (70 - 80)  BP: 102/60 (11-24-21 @ 05:15) (102/60 - 123/80)  RR:  (18 - 18)  SpO2:  (100% - 100%)  Wt(kg): --  GENERAL: female standing in room  in NAD  Abdomen: Soft, nontender, non distended  Extremities: Warm, no edema in all 4 exts  NEURO: A&O X3    LABS:  POCT Blood Glucose.: 124 mg/dL (11-24-21 @ 12:10)  POCT Blood Glucose.: 147 mg/dL (11-24-21 @ 07:40)  POCT Blood Glucose.: 193 mg/dL (11-23-21 @ 21:49)  POCT Blood Glucose.: 193 mg/dL (11-23-21 @ 16:43)  POCT Blood Glucose.: 164 mg/dL (11-23-21 @ 11:41)  POCT Blood Glucose.: 170 mg/dL (11-23-21 @ 07:30)  POCT Blood Glucose.: 195 mg/dL (11-22-21 @ 21:52)  POCT Blood Glucose.: 147 mg/dL (11-22-21 @ 17:30)  POCT Blood Glucose.: 167 mg/dL (11-22-21 @ 11:47)  POCT Blood Glucose.: 157 mg/dL (11-22-21 @ 07:28)  POCT Blood Glucose.: 241 mg/dL (11-21-21 @ 21:33)  POCT Blood Glucose.: 124 mg/dL (11-21-21 @ 17:11)                            11.7   7.66  )-----------( 353      ( 24 Nov 2021 07:26 )             36.0       11-24    138  |  102  |  12  ----------------------------<  155<H>  4.1   |  28  |  0.69    EGFR if : 115  EGFR if non : 99    Ca    9.1      11-24  Mg     2.00     11-24  Phos  3.6     11-24    TPro  7.0  /  Alb  3.4  /  TBili  0.2  /  DBili  x   /  AST  26  /  ALT  19  /  AlkPhos  102  11-24 11-18 Chol 148 Direct LDL -- LDL calculated 98 HDL 33<L> Trig 85    Thyroid Function Tests:  11-18 @ 07:10 TSH 1.27 FreeT4 -- T3 -- Anti TPO -- Anti Thyroglobulin Ab -- TSI --          A1C with Estimated Average Glucose Result: 9.1 % (11-19-21 @ 07:07)      Estimated Average Glucose: 214 (11-19-21 @ 07:07)

## 2021-11-24 NOTE — PROGRESS NOTE ADULT - PROVIDER SPECIALTY LIST ADULT
Infectious Disease
Internal Medicine
Plastic Surgery
Plastic Surgery
Endocrinology
Internal Medicine
Internal Medicine
Plastic Surgery
Cardiology
Infectious Disease
Internal Medicine
Internal Medicine
Plastic Surgery
Cardiology
Infectious Disease
Infectious Disease
Plastic Surgery
Endocrinology

## 2021-11-24 NOTE — DISCHARGE NOTE NURSING/CASE MANAGEMENT/SOCIAL WORK - NSSCTYPOFSERV_GEN_ALL_CORE
[facility] [phone] RN to see patient day after discharge / wound care.  [facility] [phone] RN to see patient ON FRIDAY 11/26/2021 / wound care.

## 2021-11-24 NOTE — PROGRESS NOTE ADULT - ASSESSMENT
a/p  52yo Female w/ MHx of HTN and Type 2 DM, who was bit on her left forearm by her neighbors dog (Vonda) on 11/12, she went to OSH was given tetanus shot and discharged on PO Doxycyline and topical Bacitracin    #Sinus tachycardia  -HR improved, asymptomatic   -likely infectious driven  -echo with nl lv fxn    #Cellulitis   -2/2 dog bite   -CT UE noted  -s/p I&D  -wound culture noted   -iv abx per primary team   -plastics / med f/u, ID f/u    #HTN  -sbp stable  -hold norvasc   -cont lower dose valsartan 80 mg QD   -cont to monitor     dvt ppx

## 2021-11-24 NOTE — PROGRESS NOTE ADULT - ASSESSMENT
52yo woman with Type 2 DM uncontrolled (HbA1C 9.1) on orals c/b neuropathy. Also with hx of HTN. Admitted for cellulitis after being bitten by dog on left forearm on 11/12. Endocrine consulted for T2DM management. BG mostly at goal with minimal excurisions on current regimen for past 4 days    1. T2DM uncontrolled c/b neuropathy  A1c 9.1%, goal less than 7%  Home Regimen: Trulicity 1.5 mg SQ weekly, Farxiga 5 mg daily, Glimepiride 2 mg daily    While inpatient:  BG target 100-180 mg/dl  Within target  Continue Lantus 15 units SQ qHS  Continue Admelog 5 units SQ TID before meals (Hold if NPO/not eating meal)  Continue Admelog LOW dose correctional scale before meals; low dose at bedtime  Check BG before meals and bedtime  Consistent carbohydrate diet, RD consult ordered and pending    Discharge Plan:  Increase Farxiga to 10 mg daily. Continue Glimepiride 2 mg daily (consider switching to Metformin ER as outpatient). Suggest increasing to Trulicity 3 mg SQ weekly, if unable to get 3 mg dose can potentially take 2 injections (of the 1.5 mg dose), also discussed diet and lifestyle interventions to implement, starting now  Ensure she has glucometer, glucose test strips, lancets, alcohol swabs   Outpatient followup with Endocrinologist (Dr. Abigail Hoffman).    2. HTN  Goal BP<130/80, within target  Continue ARB, further management per primary team    3. HLD  LDL 98 (goal <70)  Patient reports recently being started on Atorvastatin 20 mg daily by cardiologist but hasn't been taking consistently  Continue Atorvastatin 20 mg daily, encourage adherence     Discussed with patient , paged primary team  Contact via [employer] Teams Tues/Thurs/Friday during business hours  On evenings and weekends, please call [phone] or page endocrine fellow on call.   Please note that this patient may be followed by different provider tomorrow. If no answer, contact endocrine fellow on call [phone] M-F 9a-5pm  Pocket Change Card password: [email or web address]    Time spent on encounter: 25 Minutes for development of plan of care/coordination of care/glycemic control through review of labs, blood glucose values and vital signs.

## 2021-11-24 NOTE — PROGRESS NOTE ADULT - TIME BILLING
agree with above  cv stable  HR improved  echo with nl lv fxn  iv abx per primary team   plastics / med f/u, ID f/u  dvt ppx

## 2021-11-24 NOTE — PROGRESS NOTE ADULT - SUBJECTIVE AND OBJECTIVE BOX
CARDIOLOGY FOLLOW UP - Dr. David  DATE OF SERVICE: 11/24/21    CC no cp or sob      REVIEW OF SYSTEMS:  CONSTITUTIONAL: No fever, weight loss, or fatigue  RESPIRATORY: No cough, wheezing, chills or hemoptysis; No Shortness of Breath  CARDIOVASCULAR: No chest pain, palpitations, passing out, dizziness, or leg swelling  GASTROINTESTINAL: No abdominal or epigastric pain. No nausea, vomiting, or hematemesis; No diarrhea or constipation. No melena or hematochezia.  =    PHYSICAL EXAM:  T(C): 36.6 (11-24-21 @ 05:15), Max: 37 (11-23-21 @ 17:20)  HR: 70 (11-24-21 @ 05:15) (70 - 80)  BP: 102/60 (11-24-21 @ 05:15) (102/60 - 123/80)  RR: 18 (11-24-21 @ 05:15) (18 - 18)  SpO2: 100% (11-24-21 @ 05:15) (100% - 100%)  Wt(kg): --  I&O's Summary      Appearance: Normal	  Cardiovascular: Normal S1 S2,RRR, No JVD, No murmurs  Respiratory: Lungs clear to auscultation	  Gastrointestinal:  Soft, Non-tender, + BS	  Extremities:  left arm swelling       Home Medications:  amLODIPine 5 mg oral tablet: 1 tab(s) orally once a day (18 Nov 2021 15:13)  bacitracin 500 units/g topical ointment: Apply topically to affected area 2 times a day (18 Nov 2021 15:13)  Farxiga 5 mg oral tablet: 1 tab(s) orally once a day (18 Nov 2021 15:13)  glimepiride 2 mg oral tablet: 1 tab(s) orally once a day (18 Nov 2021 15:13)  Trulicity Pen 1.5 mg/0.5 mL subcutaneous solution: 1 dose(s) subcutaneous once a week on saturday (18 Nov 2021 15:13)  Tylenol 500 mg oral tablet: 1 tab(s) orally every 6 hours, As Needed (18 Nov 2021 15:13)  valsartan 160 mg oral capsule: 1 cap(s) orally once a day (18 Nov 2021 15:13)      MEDICATIONS  (STANDING):  atorvastatin 20 milliGRAM(s) Oral at bedtime  BACItracin   Ointment 1 Application(s) Topical three times a day  cefTRIAXone   IVPB 1000 milliGRAM(s) IV Intermittent every 24 hours  dextrose 40% Gel 15 Gram(s) Oral once  dextrose 5%. 1000 milliLiter(s) (50 mL/Hr) IV Continuous <Continuous>  dextrose 5%. 1000 milliLiter(s) (100 mL/Hr) IV Continuous <Continuous>  dextrose 50% Injectable 25 Gram(s) IV Push once  dextrose 50% Injectable 12.5 Gram(s) IV Push once  dextrose 50% Injectable 25 Gram(s) IV Push once  enoxaparin Injectable 40 milliGRAM(s) SubCutaneous at bedtime  glucagon  Injectable 1 milliGRAM(s) IntraMuscular once  influenza   Vaccine 0.5 milliLiter(s) IntraMuscular once  insulin glargine Injectable (LANTUS) 15 Unit(s) SubCutaneous at bedtime  insulin lispro (ADMELOG) corrective regimen sliding scale   SubCutaneous at bedtime  insulin lispro (ADMELOG) corrective regimen sliding scale   SubCutaneous three times a day before meals  insulin lispro Injectable (ADMELOG) 5 Unit(s) SubCutaneous three times a day before meals  lactobacillus acidophilus 1 Tablet(s) Oral three times a day with meals  metroNIDAZOLE  IVPB      metroNIDAZOLE  IVPB 500 milliGRAM(s) IV Intermittent every 8 hours  sodium chloride 0.9%. 1000 milliLiter(s) (100 mL/Hr) IV Continuous <Continuous>  valsartan 80 milliGRAM(s) Oral daily      TELEMETRY: 	    ECG:  	  RADIOLOGY:   DIAGNOSTIC TESTING:  [ ] Echocardiogram:  [ ]  Catheterization:  [ ] Stress Test:    OTHER: 	    LABS:	 	    Creatine Kinase, Serum: 169 U/L [25 - 170] (11-19 @ 07:07)  Creatine Kinase, Serum: 320 U/L [25 - 170] (11-18 @ 07:28)                          11.7   7.66  )-----------( 353      ( 24 Nov 2021 07:26 )             36.0     11-24    138  |  102  |  12  ----------------------------<  155<H>  4.1   |  28  |  0.69    Ca    9.1      24 Nov 2021 07:26  Phos  3.6     11-24  Mg     2.00     11-24    TPro  7.0  /  Alb  3.4  /  TBili  0.2  /  DBili  x   /  AST  26  /  ALT  19  /  AlkPhos  102  11-24

## 2021-11-26 PROBLEM — I10 ESSENTIAL (PRIMARY) HYPERTENSION: Chronic | Status: ACTIVE | Noted: 2021-11-18

## 2021-11-26 PROBLEM — E11.9 TYPE 2 DIABETES MELLITUS WITHOUT COMPLICATIONS: Chronic | Status: ACTIVE | Noted: 2021-11-18

## 2021-11-29 PROBLEM — Z00.00 ENCOUNTER FOR PREVENTIVE HEALTH EXAMINATION: Status: ACTIVE | Noted: 2021-11-29

## 2021-12-03 ENCOUNTER — APPOINTMENT (OUTPATIENT)
Dept: INTERNAL MEDICINE | Facility: CLINIC | Age: 51
End: 2021-12-03

## 2021-12-09 ENCOUNTER — APPOINTMENT (OUTPATIENT)
Dept: INFECTIOUS DISEASE | Facility: CLINIC | Age: 51
End: 2021-12-09
Payer: COMMERCIAL

## 2021-12-09 VITALS
OXYGEN SATURATION: 99 % | WEIGHT: 166 LBS | HEART RATE: 82 BPM | DIASTOLIC BLOOD PRESSURE: 82 MMHG | TEMPERATURE: 98.6 F | SYSTOLIC BLOOD PRESSURE: 128 MMHG | HEIGHT: 66 IN | BODY MASS INDEX: 26.68 KG/M2

## 2021-12-09 DIAGNOSIS — M65.9 SYNOVITIS AND TENOSYNOVITIS, UNSPECIFIED: ICD-10-CM

## 2021-12-09 PROCEDURE — 99214 OFFICE O/P EST MOD 30 MIN: CPT

## 2021-12-10 PROBLEM — M65.9 TENOSYNOVITIS OF FOREARM: Status: ACTIVE | Noted: 2021-12-10

## 2021-12-10 NOTE — REVIEW OF SYSTEMS
[Fever] : no fever [Chills] : no chills [Feeling Sick] : not feeling sick [Sore Throat] : no sore throat [Cough] : no cough [Sputum] : not coughing up ~M sputum [Diarrhea] : no diarrhea [As Noted in HPI] : as noted in HPI [Skin Wound] : skin wound [Anxiety] : no anxiety [de-identified] : healing ulcer.

## 2021-12-10 NOTE — HISTORY OF PRESENT ILLNESS
[FreeTextEntry1] : 51 years old female with PMHx of HTN and Type 2 DM, who was bit on her left forearm by her neighbors dog (Vonda) on 11/12, presented with worsening cellulitis\par Received tetanus and PO doxy at [place], \par CT left arm showed fluid collection within tendon sheath, consistent with tenosynovitis of common extensor tendon; 2 foreign bodies, but no subcutaneous abscess\par Abscess Cx 11/18 E. coli, Klebsiella, E. avium and anaerobes.\par Left forearm tenosynovitis and myositis\par pt discharged on levaquin and flagyl to complete 4 weeks on discharge until 12/15/21. \par pt is here for follow up. the arm feels better, still with some hardness, and wrist drop. \par allergy to PCN. \par tolerating abx well. \par

## 2021-12-10 NOTE — PHYSICAL EXAM
[General Appearance - Alert] : alert [General Appearance - In No Acute Distress] : in no acute distress [] : the neck was supple [Auscultation Breath Sounds / Voice Sounds] : lungs were clear to auscultation bilaterally [Heart Sounds] : normal S1 and S2 [Edema] : there was no peripheral edema [Abdomen Soft] : soft [Abdomen Tenderness] : non-tender [FreeTextEntry1] : scabs on lt arm, healing ulcer, induration around scabs, but no erythema, tenderness or warm.  [Oriented To Time, Place, And Person] : oriented to person, place, and time

## 2021-12-31 ENCOUNTER — APPOINTMENT (OUTPATIENT)
Dept: MRI IMAGING | Facility: CLINIC | Age: 51
End: 2021-12-31
Payer: COMMERCIAL

## 2021-12-31 ENCOUNTER — OUTPATIENT (OUTPATIENT)
Dept: OUTPATIENT SERVICES | Facility: HOSPITAL | Age: 51
LOS: 1 days | End: 2021-12-31

## 2021-12-31 PROCEDURE — 73218 MRI UPPER EXTREMITY W/O DYE: CPT | Mod: 26,LT

## 2022-01-02 ENCOUNTER — APPOINTMENT (OUTPATIENT)
Dept: MRI IMAGING | Facility: CLINIC | Age: 52
End: 2022-01-02

## 2022-02-01 ENCOUNTER — OUTPATIENT (OUTPATIENT)
Dept: OUTPATIENT SERVICES | Facility: HOSPITAL | Age: 52
LOS: 1 days | End: 2022-02-01
Payer: COMMERCIAL

## 2022-02-01 VITALS
TEMPERATURE: 98 F | SYSTOLIC BLOOD PRESSURE: 121 MMHG | DIASTOLIC BLOOD PRESSURE: 83 MMHG | HEART RATE: 70 BPM | OXYGEN SATURATION: 97 % | RESPIRATION RATE: 12 BRPM | HEIGHT: 66 IN | WEIGHT: 156.09 LBS

## 2022-02-01 DIAGNOSIS — G56.32 LESION OF RADIAL NERVE, LEFT UPPER LIMB: ICD-10-CM

## 2022-02-01 DIAGNOSIS — Z01.818 ENCOUNTER FOR OTHER PREPROCEDURAL EXAMINATION: ICD-10-CM
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
## 2022-02-01 LAB
ALBUMIN SERPL ELPH-MCNC: 3.9 G/DL — SIGNIFICANT CHANGE UP (ref 3.3–5)
ALP SERPL-CCNC: 126 U/L — HIGH (ref 40–120)
ALT FLD-CCNC: 19 U/L — SIGNIFICANT CHANGE UP (ref 12–78)
ANION GAP SERPL CALC-SCNC: 3 MMOL/L — LOW (ref 5–17)
AST SERPL-CCNC: 11 U/L — LOW (ref 15–37)
BILIRUB SERPL-MCNC: 0.5 MG/DL — SIGNIFICANT CHANGE UP (ref 0.2–1.2)
BUN SERPL-MCNC: 15 MG/DL — SIGNIFICANT CHANGE UP (ref 7–23)
CALCIUM SERPL-MCNC: 9.4 MG/DL — SIGNIFICANT CHANGE UP (ref 8.5–10.1)
CHLORIDE SERPL-SCNC: 102 MMOL/L — SIGNIFICANT CHANGE UP (ref 96–108)
CO2 SERPL-SCNC: 32 MMOL/L — HIGH (ref 22–31)
CREAT SERPL-MCNC: 0.76 MG/DL — SIGNIFICANT CHANGE UP (ref 0.5–1.3)
GLUCOSE SERPL-MCNC: 113 MG/DL — HIGH (ref 70–99)
HCG UR QL: NEGATIVE — SIGNIFICANT CHANGE UP
POTASSIUM SERPL-MCNC: 3.6 MMOL/L — SIGNIFICANT CHANGE UP (ref 3.5–5.3)
POTASSIUM SERPL-SCNC: 3.6 MMOL/L — SIGNIFICANT CHANGE UP (ref 3.5–5.3)
PROT SERPL-MCNC: 8.1 G/DL — SIGNIFICANT CHANGE UP (ref 6–8.3)
SODIUM SERPL-SCNC: 137 MMOL/L — SIGNIFICANT CHANGE UP (ref 135–145)

## 2022-02-01 PROCEDURE — G0463: CPT

## 2022-02-01 PROCEDURE — 80053 COMPREHEN METABOLIC PANEL: CPT

## 2022-02-01 PROCEDURE — 36415 COLL VENOUS BLD VENIPUNCTURE: CPT

## 2022-02-01 PROCEDURE — 85027 COMPLETE CBC AUTOMATED: CPT

## 2022-02-01 PROCEDURE — 81025 URINE PREGNANCY TEST: CPT

## 2022-02-01 NOTE — H&P PST ADULT - BRAND OF COVID-19 VACCINATION
In patients room to assess patient with MD when patient stated \"I need dilaudid and phenergan. \" MD, This RN was in patient room attempting to medicate patient with Zofran and patient stated \"that shit don't work. \" MD aware, no new orders received. Pfizer dose 1 and 2

## 2022-02-01 NOTE — H&P PST ADULT - NSANTHOSAYNRD_GEN_A_CORE
No. LIBIA screening performed.  STOP BANG Legend: 0-2 = LOW Risk; 3-4 = INTERMEDIATE Risk; 5-8 = HIGH Risk

## 2022-02-01 NOTE — H&P PST ADULT - ASSESSMENT
50 yo black F for exploration  left forearm     decompression left posterior  interrosseus nerve  2/8/22    Labs pending    Endocrine for clearance   52 yo black F for exploration  left forearm     decompression left posterior  interrosseus nerve  2/8/22    Labs pending    Endocrine for clearance      Repeatedly reinforced pre op instructions to pt multiple times.  She verbalizes  understanding   52 yo black F for exploration  left forearm     decompression left posterior  interrosseus nerve  2/8/22    Labs pending    Endocrine for clearance      A1c done Jan 2022  by endocrine      Repeatedly reinforced pre op instructions to pt multiple times.  She verbalizes  understanding

## 2022-02-01 NOTE — H&P PST ADULT - NSWEIGHTCALCTOOLDRUG_GEN_A_CORE
Reason For Visit  Reason For Visit:   Patient presents for follow-up .   Chaperone: NIKOLAI ALEX is unaccompanied.        Progress Note  Progress Note:   Session Type: Individual   Date: 10/11/2018   Start Time: 5, pm   End Time: 5:50, pm   Mood: Anxious and Depressed.   Recent Behavior: Cooperative.   Symptom Change: None.   Mental Status: No Problem Indicated   Risk of Harm: Low   Suicide/Homicide: None   Type of Treatment: Cognitive Behavioral Therapy.   Session Summary: Patient c/o symptoms listed above. Introduced patient to the ARC model of emotional distress so she can begin to analyze the components of her feelings and see where things need to change in order for her to feel better, feel better about herself, and achieve her life goals. Feelings of fear of being alone and depression constitute the affective component of the model. Patient's HW is to start filling in the other components.   Plan: Continue with biweekly counseling at this time.      Assessment   1. Panic disorder (F41.0)    Time  Face to Face Time:   Total face to face time spent with patient 50 minutes. Total psychotherapy time: 50 minutes.      Signatures   Electronically signed by : GERARDO JOINER, PhD; Oct 11 2018  6:06PM CST    
 used

## 2022-02-01 NOTE — H&P PST ADULT - NSICDXPASTMEDICALHX_GEN_ALL_CORE_FT
PAST MEDICAL HISTORY:  HTN (hypertension)     Hypercholesterolemia     Type 2 diabetes mellitus

## 2022-02-01 NOTE — H&P PST ADULT - HISTORY OF PRESENT ILLNESS
52 yo F for exploration of left forearm  decompression left posterior interosseous nerve     Nov 12 2021 pt reports she was bitten by a dog    52 yo F for exploration of left forearm  decompression left posterior interosseous nerve     Nov 12 2021 pt reports she was bitten by a dog   Treated in ED at that time   Pt reports she subsequently developed infection and was hospitalized for 6 d in Nov 2021

## 2022-02-07 ENCOUNTER — TRANSCRIPTION ENCOUNTER (OUTPATIENT)
Age: 52
End: 2022-02-07

## 2022-02-07 RX ORDER — SODIUM CHLORIDE 9 MG/ML
1000 INJECTION, SOLUTION INTRAVENOUS
Refills: 0 | Status: DISCONTINUED | OUTPATIENT
Start: 2022-02-08 | End: 2022-02-22

## 2022-02-08 ENCOUNTER — OUTPATIENT (OUTPATIENT)
Dept: OUTPATIENT SERVICES | Facility: HOSPITAL | Age: 52
LOS: 1 days | End: 2022-02-08
Payer: COMMERCIAL

## 2022-02-08 VITALS
OXYGEN SATURATION: 97 % | HEART RATE: 89 BPM | HEIGHT: 66 IN | WEIGHT: 156.09 LBS | RESPIRATION RATE: 12 BRPM | DIASTOLIC BLOOD PRESSURE: 83 MMHG | SYSTOLIC BLOOD PRESSURE: 122 MMHG | TEMPERATURE: 98 F

## 2022-02-08 VITALS
RESPIRATION RATE: 15 BRPM | OXYGEN SATURATION: 99 % | SYSTOLIC BLOOD PRESSURE: 129 MMHG | TEMPERATURE: 98 F | DIASTOLIC BLOOD PRESSURE: 72 MMHG | HEART RATE: 71 BPM

## 2022-02-08 DIAGNOSIS — G56.32 LESION OF RADIAL NERVE, LEFT UPPER LIMB: ICD-10-CM

## 2022-02-08 PROCEDURE — C1889: CPT

## 2022-02-08 PROCEDURE — 64772 INCISION OF SPINAL NERVE: CPT

## 2022-02-08 PROCEDURE — 82962 GLUCOSE BLOOD TEST: CPT

## 2022-02-08 DEVICE — CLIP APPLIER COVIDIEN SURGICLIP 11.5" MEDIUM: Type: IMPLANTABLE DEVICE | Status: FUNCTIONAL

## 2022-02-08 DEVICE — CLIP APPLIER COVIDIEN SURGICLIP III 9" SM: Type: IMPLANTABLE DEVICE | Status: FUNCTIONAL

## 2022-02-08 RX ORDER — METOCLOPRAMIDE HCL 10 MG
10 TABLET ORAL ONCE
Refills: 0 | Status: COMPLETED | OUTPATIENT
Start: 2022-02-08 | End: 2022-02-08

## 2022-02-08 RX ORDER — ACETAMINOPHEN 500 MG
1 TABLET ORAL
Qty: 0 | Refills: 0 | DISCHARGE

## 2022-02-08 RX ORDER — ONDANSETRON 8 MG/1
8 TABLET, FILM COATED ORAL ONCE
Refills: 0 | Status: DISCONTINUED | OUTPATIENT
Start: 2022-02-08 | End: 2022-02-08

## 2022-02-08 RX ORDER — HYDROMORPHONE HYDROCHLORIDE 2 MG/ML
1 INJECTION INTRAMUSCULAR; INTRAVENOUS; SUBCUTANEOUS
Refills: 0 | Status: DISCONTINUED | OUTPATIENT
Start: 2022-02-08 | End: 2022-02-08

## 2022-02-08 RX ORDER — SODIUM CHLORIDE 9 MG/ML
1000 INJECTION, SOLUTION INTRAVENOUS
Refills: 0 | Status: DISCONTINUED | OUTPATIENT
Start: 2022-02-08 | End: 2022-02-08

## 2022-02-08 RX ORDER — ONDANSETRON 8 MG/1
4 TABLET, FILM COATED ORAL ONCE
Refills: 0 | Status: COMPLETED | OUTPATIENT
Start: 2022-02-08 | End: 2022-02-08

## 2022-02-08 RX ORDER — BACITRACIN ZINC 500 UNIT/G
1 OINTMENT IN PACKET (EA) TOPICAL
Qty: 0 | Refills: 0 | DISCHARGE

## 2022-02-08 RX ORDER — HYDROMORPHONE HYDROCHLORIDE 2 MG/ML
0.5 INJECTION INTRAMUSCULAR; INTRAVENOUS; SUBCUTANEOUS
Refills: 0 | Status: DISCONTINUED | OUTPATIENT
Start: 2022-02-08 | End: 2022-02-08

## 2022-02-08 RX ORDER — GLIMEPIRIDE 1 MG
1 TABLET ORAL
Qty: 0 | Refills: 0 | DISCHARGE

## 2022-02-08 RX ADMIN — HYDROMORPHONE HYDROCHLORIDE 0.5 MILLIGRAM(S): 2 INJECTION INTRAMUSCULAR; INTRAVENOUS; SUBCUTANEOUS at 13:54

## 2022-02-08 RX ADMIN — SODIUM CHLORIDE 75 MILLILITER(S): 9 INJECTION, SOLUTION INTRAVENOUS at 12:42

## 2022-02-08 RX ADMIN — Medication 10 MILLIGRAM(S): at 15:13

## 2022-02-08 RX ADMIN — ONDANSETRON 4 MILLIGRAM(S): 8 TABLET, FILM COATED ORAL at 14:12

## 2022-02-08 RX ADMIN — HYDROMORPHONE HYDROCHLORIDE 0.5 MILLIGRAM(S): 2 INJECTION INTRAMUSCULAR; INTRAVENOUS; SUBCUTANEOUS at 13:39

## 2022-02-08 RX ADMIN — SODIUM CHLORIDE 50 MILLILITER(S): 9 INJECTION, SOLUTION INTRAVENOUS at 09:05

## 2022-02-08 NOTE — ASU DISCHARGE PLAN (ADULT/PEDIATRIC) - ASU DC SPECIAL INSTRUCTIONSFT
May elevate hand  No use of left hand  Prescriptions sent to pharmacy from Doctor office  May bathe- keep left arm bandages Clean dry intact.

## 2022-02-08 NOTE — ASU DISCHARGE PLAN (ADULT/PEDIATRIC) - CARE PROVIDER_API CALL
Silverio Patel (MD)  Plastic Surgery; Surgery of the Hand  [address], [address]  [place] [address]  Phone: [phone]  Fax: [phone]  Follow Up Time:

## 2022-02-08 NOTE — ASU DISCHARGE PLAN (ADULT/PEDIATRIC) - NS MD DC FALL RISK RISK
For information on Fall & Injury Prevention, visit: [email or web address] OR  [email or web address] OR  https://www.cdc.gov/steadi/patient.html

## 2022-02-08 NOTE — BRIEF OPERATIVE NOTE - NSICDXBRIEFPREOP_GEN_ALL_CORE_FT
PRE-OP DIAGNOSIS:  Left posterior interosseous nerve syndrome 08-Feb-2022 12:57:58  Silverio Patel

## 2022-02-08 NOTE — BRIEF OPERATIVE NOTE - NSICDXBRIEFPOSTOP_GEN_ALL_CORE_FT
POST-OP DIAGNOSIS:  Left posterior interosseous nerve syndrome 08-Feb-2022 12:59:11  Silverio Patel

## 2022-06-29 NOTE — PROGRESS NOTE ADULT - SUBJECTIVE AND OBJECTIVE BOX
SUBJECTIVE:  Patient seen and examined, dressing changed by PRS resident.   Afebrile overnight.     OBJECTIVE:     ** VITAL SIGNS / I&O's **    T(C): 36.8 (11-21-21 @ 07:20), Max: 37 (11-20-21 @ 17:31)  T(F): 98.2 (11-21-21 @ 07:20), Max: 98.6 (11-20-21 @ 17:31)  HR: 78 (11-21-21 @ 07:20) (77 - 90)  BP: 128/81 (11-21-21 @ 07:20) (111/67 - 128/81)  RR: 18 (11-21-21 @ 07:20) (18 - 19)  SpO2: 100% (11-21-21 @ 07:20) (100% - 100%)    Physical exam:     General: Alert, NAD  Respiratory: non-labored breathing  Extremities: smaller amouth of LUE serous draininge from distal wound site, none from proximal wound site. Erythema and edema extending to elbow and distally to forearm improving. Forearm indurated, no fluctuance.           No

## 2022-12-06 NOTE — ASU DISCHARGE PLAN (ADULT/PEDIATRIC) - PROVIDER TOKENS
Patient is a 49-year-old female with past medical history significant for psychomotor retardation, seizure disorder, hypertension who presents via EMS from outside facility for admission for aspiration pneumonia and sepsis. Patient on low-dose of Levophed drip and is admitted to the intensive care unit. Patient is reported to be nonverbal at baseline but is less responsive than normal.  EMS reports that at baseline she typically is vocal and moves about more however seems to be less responsive today. Per Chart patient's head CT was negative and CT chest showed multifocal pneumonia       Past Medical History:   Diagnosis Date    Constipation     Hypotension     Psychomotor retardation     severe    Seizure disorder [facility])        Past Surgical History:   Procedure Laterality Date    COLONOSCOPY N/A 11/18/2019    COLONOSCOPY performed by Radha Dahl MD at [facility]  11/18/2019              No family history on file.     Social History     Socioeconomic History    Marital status: UNKNOWN     Spouse name: Not on file    Number of children: Not on file    Years of education: Not on file    Highest education level: Not on file   Occupational History    Not on file   Tobacco Use    Smoking status: Never    Smokeless tobacco: Never   Substance and Sexual Activity    Alcohol use: Never    Drug use: Never    Sexual activity: Not on file   Other Topics Concern    Not on file   Social History Narrative    Not on file     Social Determinants of Health     Financial Resource Strain: Not on file   Food Insecurity: Not on file   Transportation Needs: Not on file   Physical Activity: Not on file   Stress: Not on file   Social Connections: Not on file   Intimate Partner Violence: Not on file   Housing Stability: Not on file         ALLERGIES: Sting, bee and Methsuximide    Review of Systems   Unable to perform ROS: Patient nonverbal     Vitals:    12/06/22 0832   BP: 104/69   Pulse: 97   Resp: 16 Temp: 99.4 °F (37.4 °C)   SpO2: 94%            Physical Exam  Vitals and nursing note reviewed. Constitutional:       Appearance: She is ill-appearing. Comments: Developmental delay, bedbound   HENT:      Head: Atraumatic. Right Ear: External ear normal.      Left Ear: External ear normal.      Nose: Nose normal.   Eyes:      General: No scleral icterus. Cardiovascular:      Rate and Rhythm: Normal rate. Pulses: Normal pulses. Pulmonary:      Effort: Pulmonary effort is normal.      Breath sounds: No stridor. Abdominal:      Palpations: Abdomen is soft. Comments: Colostomy in place left lower abdomen without obvious complicating feature   Musculoskeletal:      Cervical back: Neck supple. Right lower leg: No edema. Left lower leg: No edema. Skin:     General: Skin is warm and dry. Comments: Pressure sore noted to medial aspect of left foot   Neurological:      Mental Status: Mental status is at baseline. Comments:  Withdraws to pain        MDM     Amount and/or Complexity of Data Reviewed  Tests in the radiology section of CPT®: reviewed  Discuss the patient with other providers: yes    Risk of Complications, Morbidity, and/or Mortality  Presenting problems: high  Diagnostic procedures: high  Management options: high           Procedures PROVIDER:[TOKEN:[[id number]]]

## 2023-03-31 NOTE — ASU PATIENT PROFILE, ADULT - AS SC BRADEN NUTRITION
Progress Note    Patient: Sixto Verduzco Date: 3/31/2023   male, 61 year old  Admit Date: 3/29/2023   Attending: Jessica Wright MD      Subjective:  Sixto Verduzco is a 61 year old male who is being seen in follow up for Suicidal behavior with attempted self-injury (CMD) .    Patient found sitting up in bed this morning PI he appears comfortable.  He denies any pain for me.           Medications: personally reviewed today in this patient's active orders section of epic  Allergies:   Allergies as of 03/29/2023 - Reviewed 03/29/2023   Allergen Reaction Noted   • Adhesive   (environmental) RASH 08/23/2018       PHYSICAL EXAM:  Visit Vitals  /68 (BP Location: LUE - Left upper extremity, Patient Position: Sitting)   Pulse 60   Temp 98.4 °F (36.9 °C) (Oral)   Resp 17   Ht 5' 11\" (1.803 m)   Wt 56.7 kg (124 lb 14.4 oz)   SpO2 95%   BMI 17.42 kg/m²     General: A & O X 3 in no acute distress, normocephalic/atraumatic, Mood and Affect appropriate.  Lungs: Clear to auscultation bilaterally  Heart:  Regular rate and rhythm, no murmurs, no peripheral pitting pedal edema.  Abdomen: NT/ND;  + B.S.; No guarding or rebound; No peritoneal signs  Extremities: cyanosis absent; no obvious lesions or wounds.  Left above knee stump  Neurologic:  Moves all extremities.  Labs:  Recent Labs   Lab 03/31/23  0505 03/30/23  0511 03/29/23  1600 03/29/23  1317   WBC 6.7 5.3 5.0 5.5   RBC 3.53* 3.63* 3.80* 3.92*   HGB 10.4* 10.5* 11.0* 11.3*   HCT 33.7* 34.7* 35.7* 37.1*   * 122* 121* 132*   SEG  --   --  73 73     Recent Labs   Lab 03/31/23  0505 03/30/23  0511 03/29/23  1600 03/29/23  1317   SODIUM 133* 141 140 143   POTASSIUM 5.1 4.8 4.4 4.3   CHLORIDE 101 104 105 102   CO2 30 29 32 34*   BUN 42* 74* 61* 54*   CREATININE 2.76* 3.81* 2.95* 2.78*   GLUCOSE 106* 102* 263* 297*   CALCIUM 7.4* 7.7* 7.8* 8.0*   ALBUMIN 2.6* 2.6*  --  2.9*   AST 27 26  --  39*   GPT 32 27  --  41   BILIRUBIN 0.4 0.5  --  0.4   ALKPT 93 90   --  121*   INR  --   --  1.1  --        Assessment & Plan:     · Suicidal behavior with attempted self-injury -   · Nurse reports one-to-one watch was discontinued on 03/30.  Patient has remained appropriate.  · Continue with daily dressing of the wound.  · Seen by Psychiatry.    · Unable to care for self -   · Patient is awaiting placement.    · End-stage renal disease on hemodialysis -   · Dialysis on Mondays Wednesdays and Fridays.  Continue with midodrine.    · Coronary artery disease with cardiomyopathy -   · Stable.  Continue with fluid restriction    COPD not in exacerbation:  Stable continue with albuterol p.r.n..    Essential hypertension, dyslipidemia, cognitive decline:   Continue with medication.    · DVT Prophylaxis  Current Active Medications for DVT Prophylaxis (From admission, onward)         Stop     heparin (porcine) injection 5,000 Units  5,000 Units,   Subcutaneous,   3 times per day         --                Central Line- none  Simeon Catheter- none    Code status: Full Resuscitation    Disposition:  Patient is awaiting placement.    Jessica Wright MD  Hospitalist  3/31/2023  12:05 PM           (4) excellent

## 2023-05-21 ENCOUNTER — EMERGENCY (EMERGENCY)
Facility: HOSPITAL | Age: 53
LOS: 1 days | Discharge: HOME | End: 2023-05-21
Attending: STUDENT IN AN ORGANIZED HEALTH CARE EDUCATION/TRAINING PROGRAM | Admitting: STUDENT IN AN ORGANIZED HEALTH CARE EDUCATION/TRAINING PROGRAM
Payer: COMMERCIAL

## 2023-05-21 VITALS
HEART RATE: 90 BPM | SYSTOLIC BLOOD PRESSURE: 125 MMHG | TEMPERATURE: 99 F | RESPIRATION RATE: 14 BRPM | DIASTOLIC BLOOD PRESSURE: 82 MMHG | OXYGEN SATURATION: 99 %

## 2023-05-21 VITALS
HEART RATE: 105 BPM | TEMPERATURE: 100 F | SYSTOLIC BLOOD PRESSURE: 136 MMHG | OXYGEN SATURATION: 100 % | RESPIRATION RATE: 18 BRPM | DIASTOLIC BLOOD PRESSURE: 78 MMHG

## 2023-05-21 PROBLEM — E78.00 PURE HYPERCHOLESTEROLEMIA, UNSPECIFIED: Chronic | Status: ACTIVE | Noted: 2022-02-01

## 2023-05-21 LAB
ALBUMIN SERPL ELPH-MCNC: 4.4 G/DL — SIGNIFICANT CHANGE UP (ref 3.3–5)
ALP SERPL-CCNC: 129 U/L — HIGH (ref 40–120)
ALT FLD-CCNC: 24 U/L — SIGNIFICANT CHANGE UP (ref 4–33)
ANION GAP SERPL CALC-SCNC: 13 MMOL/L — SIGNIFICANT CHANGE UP (ref 7–14)
AST SERPL-CCNC: 22 U/L — SIGNIFICANT CHANGE UP (ref 4–32)
BASOPHILS # BLD AUTO: 0.02 K/UL — SIGNIFICANT CHANGE UP (ref 0–0.2)
BASOPHILS NFR BLD AUTO: 0.3 % — SIGNIFICANT CHANGE UP (ref 0–2)
BILIRUB SERPL-MCNC: 0.5 MG/DL — SIGNIFICANT CHANGE UP (ref 0.2–1.2)
BUN SERPL-MCNC: 8 MG/DL — SIGNIFICANT CHANGE UP (ref 7–23)
CALCIUM SERPL-MCNC: 9 MG/DL — SIGNIFICANT CHANGE UP (ref 8.4–10.5)
CHLORIDE SERPL-SCNC: 101 MMOL/L — SIGNIFICANT CHANGE UP (ref 98–107)
CO2 SERPL-SCNC: 24 MMOL/L — SIGNIFICANT CHANGE UP (ref 22–31)
CREAT SERPL-MCNC: 0.79 MG/DL — SIGNIFICANT CHANGE UP (ref 0.5–1.3)
EGFR: 89 ML/MIN/1.73M2 — SIGNIFICANT CHANGE UP
EOSINOPHIL # BLD AUTO: 0.01 K/UL — SIGNIFICANT CHANGE UP (ref 0–0.5)
EOSINOPHIL NFR BLD AUTO: 0.1 % — SIGNIFICANT CHANGE UP (ref 0–6)
FLUAV AG NPH QL: SIGNIFICANT CHANGE UP
FLUBV AG NPH QL: SIGNIFICANT CHANGE UP
GLUCOSE SERPL-MCNC: 150 MG/DL — HIGH (ref 70–99)
HCT VFR BLD CALC: 41.6 % — SIGNIFICANT CHANGE UP (ref 34.5–45)
HGB BLD-MCNC: 13.8 G/DL — SIGNIFICANT CHANGE UP (ref 11.5–15.5)
IANC: 5.61 K/UL — SIGNIFICANT CHANGE UP (ref 1.8–7.4)
IMM GRANULOCYTES NFR BLD AUTO: 0.3 % — SIGNIFICANT CHANGE UP (ref 0–0.9)
LYMPHOCYTES # BLD AUTO: 0.56 K/UL — LOW (ref 1–3.3)
LYMPHOCYTES # BLD AUTO: 8.3 % — LOW (ref 13–44)
MCHC RBC-ENTMCNC: 28.7 PG — SIGNIFICANT CHANGE UP (ref 27–34)
MCHC RBC-ENTMCNC: 33.2 GM/DL — SIGNIFICANT CHANGE UP (ref 32–36)
MCV RBC AUTO: 86.5 FL — SIGNIFICANT CHANGE UP (ref 80–100)
MONOCYTES # BLD AUTO: 0.56 K/UL — SIGNIFICANT CHANGE UP (ref 0–0.9)
MONOCYTES NFR BLD AUTO: 8.3 % — SIGNIFICANT CHANGE UP (ref 2–14)
NEUTROPHILS # BLD AUTO: 5.61 K/UL — SIGNIFICANT CHANGE UP (ref 1.8–7.4)
NEUTROPHILS NFR BLD AUTO: 82.7 % — HIGH (ref 43–77)
NRBC # BLD: 0 /100 WBCS — SIGNIFICANT CHANGE UP (ref 0–0)
NRBC # FLD: 0 K/UL — SIGNIFICANT CHANGE UP (ref 0–0)
PLATELET # BLD AUTO: 232 K/UL — SIGNIFICANT CHANGE UP (ref 150–400)
POTASSIUM SERPL-MCNC: 3.7 MMOL/L — SIGNIFICANT CHANGE UP (ref 3.5–5.3)
POTASSIUM SERPL-SCNC: 3.7 MMOL/L — SIGNIFICANT CHANGE UP (ref 3.5–5.3)
PROT SERPL-MCNC: 7.9 G/DL — SIGNIFICANT CHANGE UP (ref 6–8.3)
RBC # BLD: 4.81 M/UL — SIGNIFICANT CHANGE UP (ref 3.8–5.2)
RBC # FLD: 12.3 % — SIGNIFICANT CHANGE UP (ref 10.3–14.5)
RSV RNA NPH QL NAA+NON-PROBE: SIGNIFICANT CHANGE UP
SARS-COV-2 RNA SPEC QL NAA+PROBE: DETECTED
SODIUM SERPL-SCNC: 138 MMOL/L — SIGNIFICANT CHANGE UP (ref 135–145)
TROPONIN T, HIGH SENSITIVITY RESULT: <6 NG/L — SIGNIFICANT CHANGE UP
WBC # BLD: 6.78 K/UL — SIGNIFICANT CHANGE UP (ref 3.8–10.5)
WBC # FLD AUTO: 6.78 K/UL — SIGNIFICANT CHANGE UP (ref 3.8–10.5)

## 2023-05-21 PROCEDURE — 71046 X-RAY EXAM CHEST 2 VIEWS: CPT | Mod: 26

## 2023-05-21 PROCEDURE — 99285 EMERGENCY DEPT VISIT HI MDM: CPT

## 2023-05-21 PROCEDURE — 93010 ELECTROCARDIOGRAM REPORT: CPT

## 2023-05-21 RX ORDER — SODIUM CHLORIDE 9 MG/ML
500 INJECTION INTRAMUSCULAR; INTRAVENOUS; SUBCUTANEOUS ONCE
Refills: 0 | Status: COMPLETED | OUTPATIENT
Start: 2023-05-21 | End: 2023-05-21

## 2023-05-21 RX ORDER — ACETAMINOPHEN 500 MG
650 TABLET ORAL ONCE
Refills: 0 | Status: COMPLETED | OUTPATIENT
Start: 2023-05-21 | End: 2023-05-21

## 2023-05-21 RX ADMIN — SODIUM CHLORIDE 500 MILLILITER(S): 9 INJECTION INTRAMUSCULAR; INTRAVENOUS; SUBCUTANEOUS at 10:26

## 2023-05-21 RX ADMIN — Medication 650 MILLIGRAM(S): at 10:26

## 2023-05-21 RX ADMIN — Medication 650 MILLIGRAM(S): at 11:00

## 2023-05-21 RX ADMIN — SODIUM CHLORIDE 500 MILLILITER(S): 9 INJECTION INTRAMUSCULAR; INTRAVENOUS; SUBCUTANEOUS at 11:45

## 2023-05-21 NOTE — ED ADULT TRIAGE NOTE - CHIEF COMPLAINT QUOTE
Pt AOX4 c/o palpitations, body aches, sore throat, sinus pressure x 3 days; denies chest pain, denies SOB  fingerstick glucose: 146 CVA (cerebral vascular accident)

## 2023-05-21 NOTE — ED PROVIDER NOTE - CLINICAL SUMMARY MEDICAL DECISION MAKING FREE TEXT BOX
52-year-old female with PMH HTN, diabetes presents with couple days of URI symptoms and palpitations.  Exam shows some mild tonsillar hypertrophy, mildly tachycardic, no FND's, clear lungs.  Low heart score.  Likely URI.  Will check basic labs, troponin, EKG, chest x-ray.  Dispo likely home, pending results

## 2023-05-21 NOTE — ED ADULT NURSE NOTE - CHIEF COMPLAINT QUOTE
Pt AOX4 c/o palpitations, body aches, sore throat, sinus pressure x 3 days; denies chest pain, denies SOB  fingerstick glucose: 146

## 2023-05-21 NOTE — ED PROVIDER NOTE - NS ED ROS FT
REVIEW OF SYSTEMS:     CONSTITUTIONAL: No fever, +chills, no weight loss, or fatigue  EYES: No eye pain, visual disturbances, or discharge  ENMT:  No difficulty hearing, tinnitus, vertigo; + sinus or throat pain  NECK: No pain or stiffness  RESPIRATORY: + cough, no wheezing, +chills, no hemoptysis; No shortness of breath  CARDIOVASCULAR: No chest pain, +palpitations, dizziness, or leg swelling  GASTROINTESTINAL: No abdominal or epigastric pain. No nausea, vomiting, or hematemesis; No diarrhea or constipation. No melena or hematochezia.  GENITOURINARY: No dysuria, frequency, hematuria, or incontinence  NEUROLOGICAL: + headaches, no memory loss, loss of strength, numbness, or tremors  SKIN: No itching, burning, rashes, or lesions

## 2023-05-21 NOTE — ED ADULT NURSE NOTE - OBJECTIVE STATEMENT
pt c/o 2 days intermittent palpitations with flu like symptoms/ nasal congestion with body aches. pt well appearing at this time. #20 piv place in right ac, labs sent. see emar for tx, pt placed on Cm sr 90s noted.

## 2023-05-21 NOTE — ED PROVIDER NOTE - IV ALTEPLASE ADMIN OUTSIDE HIDDEN
show [employer] Pregnancy And Lactation Text: This medication is Pregnancy Category X and is absolutely contraindicated during pregnancy. It is unknown if it is excreted in breast milk.

## 2023-05-21 NOTE — ED PROVIDER NOTE - PATIENT PORTAL LINK FT
You can access the FollowMyHealth Patient Portal offered by [facility] by registering at the following website: [email or web address]. By joining [employer]’s FollowMyHealth portal, you will also be able to view your health information using other applications (apps) compatible with our system.

## 2023-05-21 NOTE — ED PROVIDER NOTE - PHYSICAL EXAMINATION
GENERAL: well appearing in no acute distress, non-toxic appearing  HEAD: normocephalic, atraumatic  HEENT: normal conjunctiva, oral mucosa moist, uvula midline, + minor tonsillar hypertrophy  CARDIAC: tachycardic rate and regular rhythm, normal S1S2, no appreciable murmurs, 2+ pulses in UE b/l  PULM: normal breath sounds, clear to ascultation bilaterally, no rales, rhonchi, wheezing  GI: abdomen nondistended, soft, nontender, no guarding, rebound tenderness  : no suprapubic tenderness  NEURO: no gross motor or sensory deficits, CN2-12 grossly intact, normal speech, PERRL, EOMI, AAOx3  MSK: no peripheral edema, no calf tenderness b/l  SKIN: well-perfused, extremities warm, no visible rashes  PSYCH: appropriate mood and affect

## 2023-05-21 NOTE — ED ADULT NURSE NOTE - NS ED NOTE  TALK SOMEONE YN
PHYSICIAN NEXT STEPS:  Review Only    CHIEF COMPLAINT:  Chief Complaint/Protocol Used: Vomiting With Diarrhea  Onset: Diarrhea x 3, vomiting x 2. Started yesterday      ASSESSMENT:  ? Onset: Diarrhea x 3, vomiting x 2. Started yesterday  ? Severity: Diarrhea x 3, vomiting x 2.  ? Onset: Yesterday  ? Fluids: Drank pedilyte but vomited -Nothing kept down, lips dry, mouth moist, making tears when cried today  ? Diarrhea: 3 yellow stools of diarrhea  ? Hydration Status: Last wet diaper 30 minutes ago-mixed with diarrhea  ? Child's Appearance: More sleep, looks and acts sick, not playful, is moving arms and legs  ? Contacts: No  ? Cause: Unsure  -------------------------------------------------------    DISPOSITION:  Disposition Recommendation: See Physician within 4 Hours (or PCP triage)  Questions that led to disposition:  ? [1] Age < 1 year old AND [2] after receiving frequent sips of ORS per guideline AND [3] continues to vomit 3 or more times AND [4] also has frequent watery diarrhea  Patient Directed To: Unspecified  Patient Intended Action: Seek care in the doctor's office       CALL NOTES:  04/11/2019 at 11:46 AM by Blaire Maxwell  ? Appointment made today at [place] at 1140am with Dr. Clinton. Mother also given care advice for fever. Mother advised to dress patient in light weight clothing, 1 light weight blanket, continue to give cool fluids in sips every 5 minutes,(Pedilyte),   and to give either Tylenol every 4 hours or Ibuprofen every 6 hours-not to alternate them for fever of 102.0F or higher and she agreed. 04/11/2019 at 11:40 AM by Blaire Maxwell  ? Fever-101.0F today at 3-4am, Rectal, recheck now-39.1 C=102.4F, Motrin at 2-3am today last dose,  diarrhea x 3, vomiting x 2. Sinus congestion, More sleepy.    DISPOSITION OVERRIDE/PROVIDER CONSULT:  Disposition Override: N/A  Override Source: Unspecified  Consulted with PCP: No  Consulted with On-Call Physician: No    CALLER CONTACT INFO:  Name: Leah  Umair (Self)  Phone 1: [phone] (Home Phone) - Preferred      ENCOUNTER STARTED:  04/11/19 11:19:25 AM  ENCOUNTER ASSIGNED TO/CLOSED BY:  Blaire Maxwell @ 04/11/19 11:46:24 AM      -------------------------------------------------------    CARE ADVICE given per Vomiting With Diarrhea guideline.  SEE PHYSICIAN WITHIN 4 HOURS (or PCP triage):  * IF OFFICE WILL BE OPEN: Your child needs to be seen within the next 3 or 4 hours. Call your doctor's office as soon as it opens.  * IF OFFICE WILL BE CLOSED AND NO PCP TRIAGE: Your child needs to be seen within the next 3 or 4 hours. A nearby Urgent Care Center is often a good source of care.  Another choice is to go to the ER.  Go sooner if your child becomes worse.  * IF OFFICE WILL BE CLOSED AND PCP TRIAGE REQUIRED: Your child may need to be seen. Your doctor will want to talk with you to decide what's best. I'll page him now. If you haven't heard from the on-call doctor within 30 minutes, call again. (Note: If PCP   can't be reached, send to [facility] or ER.); FLUIDS UNTIL SEEN:  * Offer fluids until your child is seen. (Reason: prevent dehydration)  * If under 12 weeks old (or don't have ORS), do the following:  * If formula fed, offer 5 ml (1 tsp) of formula every 5 minutes.  * If , offer formula or nurse for 5 minutes every 30 minutes.  * After 12 weeks old, offer small amounts (1-2 tsps or 5-10 mls) of ORS (e.g., Pedialyte) every 5 minutes.  * If over 1 year of age, can also use water or ORS every 5 minutes.; CALL BACK IF:    * Your child becomes worse      UNDERSTANDS CARE ADVICE: Yes    AGREES WITH CARE ADVICE: Yes    WILL FOLLOW CARE ADVICE: Yes    -------------------------------------------------------   No

## 2023-05-21 NOTE — ED PROVIDER NOTE - NSFOLLOWUPINSTRUCTIONS_ED_ALL_ED_FT
You were evaluated in the emergency department for palpitations.  Your results were discussed with you and are attached.  You tested positive for COVID-19.  Please follow-up with your primary care doctor within 1 week.    You may take 975 mg Tylenol (acetaminophen) every six hours as needed for pain or fever.  Continue to stay hydrated.    Stay home except to get medical care   You should restrict activities outside your home, except for getting medical care. Do not go to work, school, or public areas. Avoid using public transportation, ride-sharing, or taxis.   Separate yourself from other people and animals in your home   People: As much as possible, you should stay in a specific room and away from other people in your home. Also, you should use a separate bathroom, if available.   Animals: Do not handle pets or other animals while sick. See COVID-19 and Animals for more information.   Call ahead before visiting your doctor   If you have a medical appointment, call the healthcare provider and tell them that you have or may have COVID-19. This will help the healthcare provider’s office take steps to keep other people from getting infected or exposed.   Wear a facemask   You should wear a facemask when you are around other people (e.g., sharing a room or vehicle) or pets and before you enter a healthcare provider’s office. If you are not able to wear a facemask (for example, because it causes trouble breathing), then people who live with you should not stay in the same room with you, or they should wear a facemask if they enter your room.   Cover your coughs and sneezes   Cover your mouth and nose with a tissue when you cough or sneeze. Throw used tissues in a lined trash can; immediately wash your hands with soap and water for at least 20 seconds or clean your hands with an alcohol-based hand  that contains at least 60% alcohol covering all surfaces of your hands and rubbing them together until they feel dry. Soap and water should be used preferentially if hands are visibly dirty.   Avoid sharing personal household items   You should not share dishes, drinking glasses, cups, eating utensils, towels, or bedding with other people or pets in your home. After using these items, they should be washed thoroughly with soap and water.   Clean your hands often   Wash your hands often with soap and water for at least 20 seconds. If soap and water are not available, clean your hands with an alcohol-based hand  that contains at least 60% alcohol, covering all surfaces of your hands and rubbing them together until they feel dry. Soap and water should be used preferentially if hands are visibly dirty. Avoid touching your eyes, nose, and mouth with unwashed hands.   Clean all “high-touch” surfaces every day   High touch surfaces include counters, tabletops, doorknobs, bathroom fixtures, toilets, phones, keyboards, tablets, and bedside tables. Also, clean any surfaces that may have blood, stool, or body fluids on them. Use a household cleaning spray or wipe, according to the label instructions. Labels contain instructions for safe and effective use of the cleaning product including precautions you should take when applying the product, such as wearing gloves and making sure you have good ventilation during use of the product.   Monitor your symptoms   Seek prompt medical attention if your illness is worsening (e.g., difficulty breathing). Before seeking care, call your healthcare provider and tell them that you have, or are being evaluated for, COVID-19. Put on a facemask before you enter the facility. These steps will help the healthcare provider’s office to keep other people in the office or waiting room from getting infected or exposed. Ask your healthcare provider to call the local or state health department. Persons who are placed under active monitoring or facilitated self-monitoring should follow instructions provided by their local health department or occupational health professionals, as appropriate. When working with your local health department check their available hours. If you have a medical emergency and need to call 911, notify the dispatch personnel that you have, or are being evaluated for COVID-19. If possible, put on a facemask before emergency medical services arrive.   Discontinuing home isolation   Patients with confirmed COVID-19 should remain under home isolation precautions until the risk of secondary transmission to others is thought to be low. The decision to discontinue home isolation precautions should be made on a case-by-case basis, in consultation with healthcare providers and state and local health departments.  For more information: www.cdc.gov/COVID19    SEEK IMMEDIATE MEDICAL CARE IF YOU HAVE ANY OF THE FOLLOWING SYMPTOMS: worsening shortness of breath, chest pain, back pain, abdominal pain, fever, coughing up blood, lightheadedness/dizziness.    Rest, drink plenty of fluids.  Advance activity as tolerated.  Continue all previously prescribed medications as directed.  Follow up with your primary care physician in 48-72 hours- bring copies of your results.  Return to the ER for worsening or persistent symptoms, and/or ANY NEW OR CONCERNING SYMPTOMS. If you have issues obtaining follow up, please call: [phone] to obtain a doctor or specialist who takes your insurance in your area.

## 2023-05-21 NOTE — ED ADULT NURSE NOTE - NS ED NURSE IV DC DT
February 11, 2019      Johnny Grijalva MD  [address]  [address]  [facility] [place] [address]           [place] - Neurosurgery  [address] [facility] [address] [place] [address]  Phone: [phone]  Fax: [phone]          Patient: Jez Poole Sr.   MR Number: [id number]   YOB: 1941   Date of Visit: 2/11/2019       Dear Dr. Johnny Grijalva:    Thank you for referring Jez Poole to me for evaluation. Attached you will find relevant portions of my assessment and plan of care.    If you have questions, please do not hesitate to call me. I look forward to following Jez Poole along with you.    Sincerely,    Maddi Hackett MD    Enclosure  CC:  No Recipients    If you would like to receive this communication electronically, please contact [email or web address] or [phone] to request more information on [employer] Link access.    For providers and/or their staff who would like to refer a patient to [facility], please contact us through our one-stop-shop provider referral line, [facility] , at [phone].    If you feel you have received this communication in error or would no longer like to receive these types of communications, please e-mail [email or web address]         
21-May-2023 12:22

## 2023-05-21 NOTE — ED ADULT NURSE NOTE - NSFALLUNIVINTERV_ED_ALL_ED
Bed/Stretcher in lowest position, wheels locked, appropriate side rails in place/Call bell, personal items and telephone in reach/Instruct patient to call for assistance before getting out of bed/chair/stretcher/Non-slip footwear applied when patient is off stretcher/[place] to call system/Physically safe environment - no spills, clutter or unnecessary equipment/Purposeful proactive rounding/Room/bathroom lighting operational, light cord in reach

## 2023-05-21 NOTE — ED PROVIDER NOTE - OBJECTIVE STATEMENT
52-year-old female with PMH HTN, T2DM presents with x2 days of generalized body aches, palpitations, sore throat.  Patient states that she normally gets seasonal allergies and had similar symptoms that started a couple days ago.  Patient states that yesterday she felt her heart beating fast and saw that her heart rate was 117.  Patient denies any chest pain, diaphoresis, shortness of breath.  Patient also endorses right-sided headache behind her eye that feels like a pressure, not associated with nausea or vomiting, no photophobia or phonophobia.  Patient recently went back to work he works for [employer] and is concerned that she may have gotten COVID because she felt similarly in 2020 when she had covid with palpitaitons.  Endorses chills, congestion, dry cough, headache but denies chest pain, shortness of breath, abdominal pain, nausea, vomiting, dysuria, constipation, diarrhea.

## 2023-05-21 NOTE — ED PROVIDER NOTE - ATTENDING CONTRIBUTION TO CARE
52-year-old female with PMH HTN, T2DM presents with x2 days of generalized body aches, palpitations, sore throat, runny nose/congestion.  Patient states that she normally gets seasonal allergies and had similar symptoms that started a couple days ago.  Patient states that yesterday she felt her heart beating fast and saw that her heart rate was 117.  Patient denies any chest pain, diaphoresis, shortness of breath.  Patient recently went back to work he works for [employer] and is concerned that she may have gotten COVID or flu because she felt similarly in 2020 when she had covid with palpitaitons.  Endorses chills, congestion, dry cough, headache but denies chest pain, shortness of breath, abdominal pain, nausea, vomiting, dysuria, constipation, diarrhea. currently not having symptoms but earlier today her pulse measures betw 117-119.  has not taken any decongestants. unsure if hse had fever. pt states she had palpitations in past and spoke with her cards and told her it was likely due to dehydration or menopause   pt well appearing,e kg wnl, likely URI. no signs of arrythmia likely uri, will check basic labs, tsh, outpt cards fu

## 2023-05-26 NOTE — ED POST DISCHARGE NOTE - RESULT SUMMARY
Pt states that she tested + for covid and still has diarrhea and a headache. Pt has off from Work until Tuesday 5/30. Pt was asking for an extension off from work as she does not feel well. I explained to pt that you only need to isolate for 5 days now unless you are still  symptomatic. I informed pt to wait until tuesday to see how she feels, if she is still not feeling well can call back and we can provide a note for more time off.

## 2024-02-06 NOTE — H&P PST ADULT - TOBACCO USE
Anesthesia Pre-Procedure Evaluation    Patient: Adina Galvez   MRN: [id number] : 1993        Procedure :           History reviewed. No pertinent past medical history.   Past Surgical History:   Procedure Laterality Date    WISDOM TOOTH EXTRACTION Bilateral 2005    wisodom teeth Bilateral       No Known Allergies   Social History     Tobacco Use    Smoking status: Never     Passive exposure: Never    Smokeless tobacco: Never   Substance Use Topics    Alcohol use: Not Currently      Wt Readings from Last 1 Encounters:   24 98.4 kg (217 lb)        Anesthesia Evaluation            ROS/MED HX  ENT/Pulmonary:  - neg pulmonary ROS     Neurologic:  - neg neurologic ROS     Cardiovascular:  - neg cardiovascular ROS     METS/Exercise Tolerance:     Hematologic:  - neg hematologic  ROS     Musculoskeletal:       GI/Hepatic:  - neg GI/hepatic ROS     Renal/Genitourinary:       Endo:  - neg endo ROS   (+)               Obesity,       Psychiatric/Substance Use:  - neg psychiatric ROS     Infectious Disease:       Malignancy:       Other:   Pregnant         Physical Exam    Airway        Mallampati: II   TM distance: > 3 FB   Neck ROM: full   Mouth opening: > 3 cm    Respiratory Devices and Support         Dental  no notable dental history         Cardiovascular   cardiovascular exam normal          Pulmonary   pulmonary exam normal                OUTSIDE LABS:  CBC:   Lab Results   Component Value Date    WBC 11.5 (H) 2024    WBC 10.6 2023    HGB 12.3 2024    HGB 12.6 2023    HCT 36.4 2024    HCT 37.5 2023     2024     2023     BMP:   Lab Results   Component Value Date     2023    POTASSIUM 3.8 2023    CHLORIDE 107 2023    CO2 20 (L) 2023    BUN 12 2023    CR 0.74 2023    GLC 97 2023    GLC 89 2022     COAGS:   Lab Results   Component Value Date    PTT 27 2022    INR 1.02 2022    FIBR  "344 06/28/2022     POC: No results found for: \"BGM\", \"HCG\", \"HCGS\"  HEPATIC:   Lab Results   Component Value Date    ALBUMIN 3.9 06/24/2023    PROTTOTAL 7.1 06/24/2023    ALT 14 06/24/2023    AST 17 06/24/2023    ALKPHOS 56 06/24/2023    BILITOTAL <0.1 06/24/2023     OTHER:   Lab Results   Component Value Date    A1C 5.1 06/28/2022    CHARLEE 9.9 06/24/2023    TSH 0.88 06/28/2022       Anesthesia Plan    ASA Status:  2       Anesthesia Type: Epidural.              Consents    Anesthesia Plan(s) and associated risks, benefits, and realistic alternatives discussed. Questions answered and patient/representative(s) expressed understanding.     - Discussed:     - Discussed with:  Patient            Postoperative Care            Comments:           neg OB ROS.      Jh Coleman MD    I have reviewed the pertinent notes and labs in the chart from the past 30 days and (re)examined the patient.  Any updates or changes from those notes are reflected in this note.                  " Never smoker

## 2024-05-19 ENCOUNTER — EMERGENCY (EMERGENCY)
Facility: HOSPITAL | Age: 54
LOS: 1 days | Discharge: HOME | End: 2024-05-19
Attending: STUDENT IN AN ORGANIZED HEALTH CARE EDUCATION/TRAINING PROGRAM | Admitting: STUDENT IN AN ORGANIZED HEALTH CARE EDUCATION/TRAINING PROGRAM
Payer: COMMERCIAL

## 2024-05-19 VITALS
TEMPERATURE: 98 F | OXYGEN SATURATION: 99 % | RESPIRATION RATE: 18 BRPM | HEIGHT: 66 IN | WEIGHT: 154.32 LBS | HEART RATE: 85 BPM | DIASTOLIC BLOOD PRESSURE: 65 MMHG | SYSTOLIC BLOOD PRESSURE: 124 MMHG

## 2024-05-19 VITALS
OXYGEN SATURATION: 99 % | HEART RATE: 76 BPM | TEMPERATURE: 98 F | DIASTOLIC BLOOD PRESSURE: 73 MMHG | SYSTOLIC BLOOD PRESSURE: 115 MMHG | RESPIRATION RATE: 14 BRPM

## 2024-05-19 LAB
ALBUMIN SERPL ELPH-MCNC: 4.1 G/DL — SIGNIFICANT CHANGE UP (ref 3.3–5)
ALP SERPL-CCNC: 124 U/L — HIGH (ref 40–120)
ALT FLD-CCNC: 21 U/L — SIGNIFICANT CHANGE UP (ref 4–33)
ANION GAP SERPL CALC-SCNC: 11 MMOL/L — SIGNIFICANT CHANGE UP (ref 7–14)
AST SERPL-CCNC: 17 U/L — SIGNIFICANT CHANGE UP (ref 4–32)
BASOPHILS # BLD AUTO: 0.02 K/UL — SIGNIFICANT CHANGE UP (ref 0–0.2)
BASOPHILS NFR BLD AUTO: 0.4 % — SIGNIFICANT CHANGE UP (ref 0–2)
BILIRUB SERPL-MCNC: 0.5 MG/DL — SIGNIFICANT CHANGE UP (ref 0.2–1.2)
BUN SERPL-MCNC: 10 MG/DL — SIGNIFICANT CHANGE UP (ref 7–23)
CALCIUM SERPL-MCNC: 9.3 MG/DL — SIGNIFICANT CHANGE UP (ref 8.4–10.5)
CHLORIDE SERPL-SCNC: 102 MMOL/L — SIGNIFICANT CHANGE UP (ref 98–107)
CO2 SERPL-SCNC: 27 MMOL/L — SIGNIFICANT CHANGE UP (ref 22–31)
CREAT SERPL-MCNC: 0.75 MG/DL — SIGNIFICANT CHANGE UP (ref 0.5–1.3)
EGFR: 94 ML/MIN/1.73M2 — SIGNIFICANT CHANGE UP
EOSINOPHIL # BLD AUTO: 0.05 K/UL — SIGNIFICANT CHANGE UP (ref 0–0.5)
EOSINOPHIL NFR BLD AUTO: 0.9 % — SIGNIFICANT CHANGE UP (ref 0–6)
GLUCOSE SERPL-MCNC: 166 MG/DL — HIGH (ref 70–99)
HCT VFR BLD CALC: 42 % — SIGNIFICANT CHANGE UP (ref 34.5–45)
HGB BLD-MCNC: 13.6 G/DL — SIGNIFICANT CHANGE UP (ref 11.5–15.5)
IANC: 4.04 K/UL — SIGNIFICANT CHANGE UP (ref 1.8–7.4)
IMM GRANULOCYTES NFR BLD AUTO: 0.2 % — SIGNIFICANT CHANGE UP (ref 0–0.9)
LYMPHOCYTES # BLD AUTO: 0.98 K/UL — LOW (ref 1–3.3)
LYMPHOCYTES # BLD AUTO: 18.3 % — SIGNIFICANT CHANGE UP (ref 13–44)
MCHC RBC-ENTMCNC: 28.2 PG — SIGNIFICANT CHANGE UP (ref 27–34)
MCHC RBC-ENTMCNC: 32.4 GM/DL — SIGNIFICANT CHANGE UP (ref 32–36)
MCV RBC AUTO: 87.1 FL — SIGNIFICANT CHANGE UP (ref 80–100)
MONOCYTES # BLD AUTO: 0.26 K/UL — SIGNIFICANT CHANGE UP (ref 0–0.9)
MONOCYTES NFR BLD AUTO: 4.9 % — SIGNIFICANT CHANGE UP (ref 2–14)
NEUTROPHILS # BLD AUTO: 4.04 K/UL — SIGNIFICANT CHANGE UP (ref 1.8–7.4)
NEUTROPHILS NFR BLD AUTO: 75.3 % — SIGNIFICANT CHANGE UP (ref 43–77)
NRBC # BLD: 0 /100 WBCS — SIGNIFICANT CHANGE UP (ref 0–0)
NRBC # FLD: 0 K/UL — SIGNIFICANT CHANGE UP (ref 0–0)
PLATELET # BLD AUTO: 240 K/UL — SIGNIFICANT CHANGE UP (ref 150–400)
POTASSIUM SERPL-MCNC: 3.9 MMOL/L — SIGNIFICANT CHANGE UP (ref 3.5–5.3)
POTASSIUM SERPL-SCNC: 3.9 MMOL/L — SIGNIFICANT CHANGE UP (ref 3.5–5.3)
PROT SERPL-MCNC: 7.9 G/DL — SIGNIFICANT CHANGE UP (ref 6–8.3)
RBC # BLD: 4.82 M/UL — SIGNIFICANT CHANGE UP (ref 3.8–5.2)
RBC # FLD: 12.1 % — SIGNIFICANT CHANGE UP (ref 10.3–14.5)
SODIUM SERPL-SCNC: 140 MMOL/L — SIGNIFICANT CHANGE UP (ref 135–145)
TROPONIN T, HIGH SENSITIVITY RESULT: <6 NG/L — SIGNIFICANT CHANGE UP
TSH SERPL-MCNC: 0.89 UIU/ML — SIGNIFICANT CHANGE UP (ref 0.27–4.2)
WBC # BLD: 5.36 K/UL — SIGNIFICANT CHANGE UP (ref 3.8–10.5)
WBC # FLD AUTO: 5.36 K/UL — SIGNIFICANT CHANGE UP (ref 3.8–10.5)

## 2024-05-19 PROCEDURE — 93010 ELECTROCARDIOGRAM REPORT: CPT

## 2024-05-19 PROCEDURE — 99285 EMERGENCY DEPT VISIT HI MDM: CPT

## 2024-05-19 NOTE — ED ADULT NURSE NOTE - OBJECTIVE STATEMENT
Intake pt coming few days of palpitation, denies Dizziness, no SOB, no HA, no numbness to upper ext.  pt ambulatory to ER.  CM NSR lungs clear, resp. equal 18/20b/min.  IV to right Ac 20g angio cath place.   pending dispo.       Cathy Meraz RN

## 2024-05-19 NOTE — ED PROVIDER NOTE - NSICDXFAMILYHX_GEN_ALL_CORE_FT
Called patient to schedule IR thyroid bx.  Patient's daughter states that patient is out of state until mid-July.  RN will call back in June to get her scheduled.  RN updated Dr. Bo's office and they will extend the orders.     FAMILY HISTORY:  FH: type 2 diabetes

## 2024-05-19 NOTE — ED PROVIDER NOTE - NSFOLLOWUPINSTRUCTIONS_ED_ALL_ED_FT
1. TAKE ALL MEDICATIONS AS DIRECTED.    2. FOR PAIN OR FEVER YOU CAN TAKE IBUPROFEN (MOTRIN, ADVIL) OR ACETAMINOPHEN (TYLENOL) AS NEEDED, AS DIRECTED ON PACKAGING.  3. FOLLOW UP WITH YOUR PRIMARY DOCTOR WITHIN 5 DAYS AS DIRECTED.  4. IF YOU HAD LABS OR IMAGING DONE, YOU WERE GIVEN COPIES OF ALL LABS AND/OR IMAGING RESULTS FROM YOUR ER VISIT--PLEASE TAKE THEM WITH YOU TO YOUR FOLLOW UP APPOINTMENTS.  5. IF NEEDED, CALL PATIENT ACCESS SERVICES AT [phone] TO FIND A PRIMARY CARE PHYSICIAN.  OR CALL [phone] TO MAKE AN APPOINTMENT WITH THE CLINIC.  6. RETURN TO THE ER FOR ANY WORSENING SYMPTOMS OR CONCERNS.    Follow up with cardiology within 1 week        English    Palpitations  Palpitations are feelings that your heartbeat is irregular or is faster than normal. It may feel like your heart is fluttering or skipping a beat. Palpitations may be caused by many things, including smoking, caffeine, alcohol, stress, and certain medicines or drugs. Most causes of palpitations are not serious.    However, some palpitations can be a sign of a serious problem. Further tests and a thorough medical history will be done to find the cause of your palpitations. Your provider may order tests such as an ECG, labs, an echocardiogram, or an ambulatory continuous ECG monitor.    Follow these instructions at home:  Pay attention to any changes in your symptoms. Let your health care provider know about them. Take these actions to help manage your symptoms:    Eating and drinking    Follow instructions from your health care provider about eating or drinking restrictions. You may need to avoid foods and drinks that may cause palpitations. These may include:  Caffeinated coffee, tea, soft drinks, and energy drinks.  Chocolate.  Alcohol.  Diet pills.  Lifestyle    A person sitting on the floor doing yoga.  A sign telling the reader not to smoke.  Take steps to reduce your stress and anxiety. Things that can help you relax include:  Yoga.  Mind-body activities, such as deep breathing, meditation, or using words and images to create positive thoughts (guided imagery).  Physical activity, such as swimming, jogging, or walking. Tell your health care provider if your palpitations increase with activity. If you have chest pain or shortness of breath with activity, do not continue the activity until you are seen by your health care provider.  Biofeedback. This is a method that helps you learn to use your mind to control things in your body, such as your heartbeat.  Get plenty of rest and sleep. Keep a regular bed time.  Do not use drugs, including cocaine or ecstasy. Do not use marijuana.  Do not use any products that contain nicotine or tobacco. These products include cigarettes, chewing tobacco, and vaping devices, such as e-cigarettes. If you need help quitting, ask your health care provider.  General instructions    Take over-the-counter and prescription medicines only as told by your health care provider.  Keep all follow-up visits. This is important. These may include visits for further testing if palpitations do not go away or get worse.  Contact a health care provider if:  You continue to have a fast or irregular heartbeat for a long period of time.  You notice that your palpitations occur more often.  Get help right away if:  You have chest pain or shortness of breath.  You have a severe headache.  You feel dizzy or you faint.  These symptoms may represent a serious problem that is an emergency. Do not wait to see if the symptoms will go away. Get medical help right away. Call your local emergency services (911 in the [place] Do not drive yourself to the hospital.    Summary  Palpitations are feelings that your heartbeat is irregular or is faster than normal. It may feel like your heart is fluttering or skipping a beat.  Palpitations may be caused by many things, including smoking, caffeine, alcohol, stress, certain medicines, and drugs.  Further tests and a thorough medical history may be done to find the cause of your palpitations.  Get help right away if you faint or have chest pain, shortness of breath, severe headache, or dizziness.  This information is not intended to replace advice given to you by your health care provider. Make sure you discuss any questions you have with your health care provider.    Document Revised: 05/11/2022 Document Reviewed: 05/11/2022  [employer] Patient Education © 2024 [employer]  [employer] logo  Terms and Conditions  Privacy Policy  Editorial Policy  All content on this site: Copyright © 2024 [employer], its licensors, and contributors. All rights are reserved, including those for text and data mining, AI training, and similar technologies. For all open access content, the Creative Commons licensing terms apply.  Cookies are used by this site. To decline or learn more, visit our Cookies page.  [employer]

## 2024-05-19 NOTE — ED PROVIDER NOTE - PATIENT PORTAL LINK FT
You can access the FollowMyHealth Patient Portal offered by [facility] by registering at the following website: [email or web address]. By joining [employer]’s FollowMyHealth portal, you will also be able to view your health information using other applications (apps) compatible with our system.

## 2024-05-19 NOTE — ED PROVIDER NOTE - CLINICAL SUMMARY MEDICAL DECISION MAKING FREE TEXT BOX
52yo F ho htn, DM2, presents 1+ weeks of intermittent palpitations and heart fluttering, no CP, no SOB, does drinka little caffeine.  palpitations, ekg with no arrythmia, will check labs, tsh, cardaic monitor cards fu for holter

## 2024-05-19 NOTE — ED ADULT NURSE NOTE - NSFALLUNIVINTERV_ED_ALL_ED
Bed/Stretcher in lowest position, wheels locked, appropriate side rails in place/Call bell, personal items and telephone in reach/Instruct patient to call for assistance before getting out of bed/chair/stretcher/Non-slip footwear applied when patient is off stretcher/[place] to call system/Physically safe environment - no spills, clutter or unnecessary equipment/Purposeful proactive rounding/Room/bathroom lighting operational, light cord in reach

## 2024-05-19 NOTE — ED ADULT TRIAGE NOTE - CHIEF COMPLAINT QUOTE
pt living with DM type 2, c/o of palpitations for past one week, pt denies any chest pain, denies sob

## 2024-05-19 NOTE — ED PROVIDER NOTE - OBJECTIVE STATEMENT
52yo F ho htn, DM2, presents 1+ weeks of intermittent palpitations and heart fluttering, no CP, no SOB, does drinka little caffeine.

## 2024-12-02 NOTE — ED ADULT TRIAGE NOTE - ARRIVAL FROM
Pt arrived via cart with RN from IR s/p bone biopsy-right pelvic. VSS. Right hip site CDI, no hematoma. Patient denies any new pain.  Wife will  post procedure.  Flat bedrest until 1100 per MD orders       Home

## 2025-02-13 NOTE — PATIENT PROFILE ADULT - HARM RISK FACTORS
CONSULT NOTE    Infectious Diseases - Roxanne Crump MD  [facility]       Patient Identification:  Name: Chrissy Gutierrez  Age: 72 y.o.  Sex: female  :  1952  MRN: [id number]             Date of Consultation: 2025      Primary Care Physician: Jai Steven MD                               Requesting Physician: Dr. Boateng  Reason for Consultation: Pneumonia, septic arthritis, UTI    History of presenting illness: Source of information patient herself and review of records.  Patient is a 72-year-old female with complicated past medical history remarkable for hypertension, diabetes, chronic kidney disease, osteoarthritis involving bilateral knee resulting in her using walker for so many years for ambulation came to the emergency room on 2/10/2025 after progressive pain and discomfort in her left knee following a fall few days prior.  Patient reached a point that she could not even walk despite the help with a walker.  She was also having discomfort in her right shoulder forearm and wrist.  Patient was also dealing with off-and-on cough congestion with the diagnosis of flu in the family.  She did not have any burning in urination frequency or urgency but she was noted to have abnormal urinalysis consistent with UTI.  Upon admission her review of system was remarkable for congestion rhinorrhea sinus pressure cough and pain and discomfort in her left knee but also was involving the other knee.  Over the course of last 3 days patient was found to have abnormal urinalysis consistent with UTI urine culture positive for pansensitive E. coli, respiratory viral panel come back positive for influenza A and chest x-ray shows evidence of mild atelectasis or pneumonia of the right lung base.  Patient was started on IV Rocephin after urine culture and blood cultures were sent.  Because of her persistent discomfort involving the left knee orthopedic surgery service was consulted and patient had  joint aspiration performed on 2/11/2025 which showed significant number of nucleated cells with 94% PMNs and also showed uric acid crystals.  Patient was assessed to have possibility of concomitant septic arthritis and gout and was recommended to undergo I&D and washout of the left knee which was performed on 2/11/2025.  Patient is overall feeling better with decreased pain and discomfort in her left knee.  She denies any fever and chills.  He denies any burning in urination frequency and urgency.  Her cough and congestion and breathing is improving.  Infectious disease service is consulted.  Impression: 72-year-old female with  1-painful tender left knee with decline in function status post joint aspiration followed by I&D and washout in the setting of respiratory tract infection with influenza A and abnormal urinalysis consistent with UTI-this presentation of tender painful knee with worsening function and abnormal joint fluid analysis with crystal for uric acid positive could very well be due to combination of multiple pathologies happening together:   -Acute gouty arthritis with   -Secondary septic arthritis due to hematogenous seeding from the lung or urine versus   -Progression of osteoarthritis.  2-acute influenza A with secondary bacterial pneumonia  3-abnormal urinalysis with urine culture without any specific urinary symptoms  4-acute on chronic renal insufficiency with prior history of left-sided hydronephrosis and history of right nephrectomy  5-osteoarthritis of the right shoulder and wrist without any fracture  6-hypertension  7-peripheral neuropathy  8-history of renal cell carcinoma-history of right nephrectomy  9-type 2 diabetes.    Recommendations/Discussions:  At this juncture patient has received antibiotic therapy directed towards UTI and pneumonia and also has undergone surgical intervention for presumed septic arthritis with I&D and washout 2/11/2025.  Patient is improved postoperatively but  all her joint fluid cultures have been negative.  Her negative joint fluid culture could be due to prior use of antibiotic therapy before the procedure or possibility that her joint symptoms were due to bland process due to gouty arthritis in the setting of chronic osteoarthritis.  However since she has had I&D performed and her joint fluid culture could easily be understood as negative due to the prior use of antibiotic therapy I think the best course of action would be to treat her as if she has septic arthritis with 4 weeks course of IV Rocephin for pathogens that are isolated in her urine culture as well as common pathogens that could cause hematogenous seeding of the right knee, while concomitantly treating her for gout and osteoarthritis.  Patient has not received any vancomycin and there is no evidence of MRSA.  She is clinically better.  Would recommend 4 weeks of IV Rocephin from 2/11/2024 at 1 g IV every 24 hours.  Management of gout and osteoarthritis per orthopedic surgery service including if needed use of steroids.  Given her renal insufficiency and prior history of left-sided hydronephrosis and nephrectomy would recommend imaging studies of her abdomen and pelvis to rule out a structural  tract abnormalities in the setting of positive urine culture to see if she needs any other intervention or if there is any undrained focus that may require intervention.  Thank you very much for letting me be the part of your patient care please see above impression and recommendations            Past Medical History:  Past Medical History:   Diagnosis Date    Cancer     right kidney    Chronic kidney disease     Hypertension     Low back pain     Osteoarthritis     Peripheral neuropathy      Past Surgical History:  Past Surgical History:   Procedure Laterality Date    BACK SURGERY  2021    EPIDURAL BLOCK      KIDNEY SURGERY Right 03/08/2017    REMOVED RIGHT KIDNEY    LUMBAR DISCECTOMY FUSION INSTRUMENTATION N/A  06/09/2017    Procedure: 1 LEVEL LAMINECTOMY DECOMPRESSION L4 5 EXCISION FACET CYST;  Surgeon: Negrito Oconnell DO;  Location: [facility] MAIN OR;  Service:     TUBAL ABDOMINAL LIGATION      WISDOM TOOTH EXTRACTION        Home Meds:  Medications Prior to Admission   Medication Sig Dispense Refill Last Dose/Taking    acetaminophen (TYLENOL) 500 MG tablet Take 1 tablet by mouth Every 6 (Six) Hours As Needed for Mild Pain.   2/9/2025 Bedtime    amLODIPine-benazepril (LOTREL) 10-40 MG per capsule Take 1 capsule by mouth.   2/9/2025 Morning    aspirin 81 MG EC tablet Take 1 tablet by mouth Daily.   2/9/2025 Morning    carvedilol (COREG) 25 MG tablet Take 1 tablet by mouth.   2/9/2025 Evening    dapagliflozin Propanediol (Farxiga) 10 MG tablet Take 10 mg by mouth Daily.   2/9/2025 Morning    furosemide (LASIX) 20 MG tablet Take 1 tablet by mouth Daily.   2/9/2025 Morning    Garlic 400 MG tablet delayed-release Take 1 tablet by mouth. On hold for sx.   2/9/2025 Morning    glimepiride (AMARYL) 2 MG tablet Take 1 tablet by mouth.   2/9/2025 Morning    hydrochlorothiazide (HYDRODIURIL) 25 MG tablet Take 1 tablet by mouth.   2/9/2025 Morning    insulin glargine (LANTUS, SEMGLEE) 100 UNIT/ML injection Inject 10 Units under the skin into the appropriate area as directed Daily.   2/9/2025 Morning    methocarbamol (ROBAXIN) 500 MG tablet Take 1 tablet by mouth 4 (Four) Times a Day As Needed for Muscle Spasms. 15 tablet 0 2/9/2025 Morning    Misc Natural Products (OSTEO BI-FLEX ADV DOUBLE ST PO) Take 2 tablets by mouth Daily.   2/9/2025 Evening    NIACIN PO Take 1 tablet by mouth Daily.   2/9/2025 Morning    Omega-3 Fatty Acids (FISH OIL CONCENTRATE PO) Take 2,000 mg by mouth Daily.   2/9/2025 Morning    rosuvastatin (CRESTOR) 10 MG tablet Take 1 tablet by mouth Daily.   2/9/2025 Evening    sodium bicarbonate 650 MG tablet Take 1 tablet by mouth Daily.   2/9/2025 Evening    sodium zirconium cyclosilicate (Lokelma) 5 g packet Take 5 g by  mouth Every Other Day. Empty entire contents of the packet(s) into a glass with at least 3 tablespoons (45 mL) of water. Stir well and drink immediately; if powder remains in the glass, add water, stir and drink immediately; repeat until no powder remains. Administer other oral medications at least 2 hours before or 2 hours after dose.   2/9/2025 Morning    HYDROcodone-acetaminophen (NORCO) 5-325 MG per tablet Take 1 tablet by mouth Every 6 (Six) Hours As Needed (Pain). 40 tablet 0     metFORMIN (GLUCOPHAGE) 500 MG tablet Take 1 tablet by mouth 2 (Two) Times a Day With Meals.       Multiple Vitamins-Minerals (MULTIVITAMIN ADULT PO) Take 1 tablet by mouth Daily.       simvastatin (ZOCOR) 20 MG tablet Take 1 tablet by mouth.        Current Meds:     Current Facility-Administered Medications:     acetaminophen (TYLENOL) tablet 650 mg, 650 mg, Oral, Q4H PRN, 650 mg at 02/12/25 2045 **OR** acetaminophen (TYLENOL) 160 MG/5ML oral solution 650 mg, 650 mg, Oral, Q4H PRN **OR** acetaminophen (TYLENOL) suppository 650 mg, 650 mg, Rectal, Q4H PRN, Arvind Ibarra MD    aspirin EC tablet 81 mg, 81 mg, Oral, Daily, Arvind Ibarra MD, 81 mg at 02/11/25 0920    sennosides-docusate (PERICOLACE) 8.6-50 MG per tablet 2 tablet, 2 tablet, Oral, BID PRN **AND** polyethylene glycol (MIRALAX) packet 17 g, 17 g, Oral, Daily PRN **AND** bisacodyl (DULCOLAX) EC tablet 5 mg, 5 mg, Oral, Daily PRN **AND** bisacodyl (DULCOLAX) suppository 10 mg, 10 mg, Rectal, Daily PRN, Arvind Ibarra MD    calcium carbonate (TUMS) chewable tablet 500 mg (200 mg elemental), 2 tablet, Oral, BID PRN, Arvind Ibarra MD    carvedilol (COREG) tablet 12.5 mg, 12.5 mg, Oral, BID With Meals, Arvind Ibarra MD, 12.5 mg at 02/12/25 1901    ceFAZolin 2000 mg IVPB in 100 mL NS (MBP), 2,000 mg, Intravenous, Q8H, Arvind Ibarra MD, 2,000 mg at 02/12/25 2342    cefTRIAXone (ROCEPHIN) 2,000 mg in sodium chloride 0.9 % 100 mL MBP, 2,000 mg, Intravenous,  Q24H, Arvind Ibarra MD, Last Rate: 200 mL/hr at 02/13/25 0445, 2,000 mg at 02/13/25 0445    dextrose (D50W) (25 g/50 mL) IV injection 25 g, 25 g, Intravenous, Q15 Min PRN, Arvind Ibarra MD    dextrose (GLUTOSE) oral gel 15 g, 15 g, Oral, Q15 Min PRN, Arvind Ibarra MD    doxycycline (MONODOX) capsule 100 mg, 100 mg, Oral, Q12H, Arvind Ibarra MD, 100 mg at 02/12/25 2045    empagliflozin (JARDIANCE) tablet 25 mg, 25 mg, Oral, Daily, Arvind Ibarra MD, 25 mg at 02/12/25 0823    Enoxaparin Sodium (LOVENOX) syringe 40 mg, 40 mg, Subcutaneous, Q24H, Arvind Ibarra MD, 40 mg at 02/11/25 1425    glipizide (GLUCOTROL) tablet 5 mg, 5 mg, Oral, QAM AC, Arvind Ibarra MD, 5 mg at 02/11/25 0920    glucagon (GLUCAGEN) injection 1 mg, 1 mg, Intramuscular, Q15 Min PRN, Arvind Ibarra MD    insulin glargine (LANTUS, SEMGLEE) injection 10 Units, 10 Units, Subcutaneous, Daily, Arvind Ibarra MD, 10 Units at 02/11/25 0919    insulin lispro (HUMALOG/ADMELOG) injection 2-9 Units, 2-9 Units, Subcutaneous, 4x Daily AC & at Bedtime, Arvind Ibarra MD, 6 Units at 02/12/25 2045    ipratropium-albuterol (DUO-NEB) nebulizer solution 3 mL, 3 mL, Nebulization, Q4H PRN, Arvind Ibarra MD    ipratropium-albuterol (DUO-NEB) nebulizer solution 3 mL, 3 mL, Nebulization, Q6H While Awake - RT, Arvind Ibarra MD, 3 mL at 02/12/25 2020    methocarbamol (ROBAXIN) tablet 500 mg, 500 mg, Oral, 4x Daily PRN, Arvind Ibarra MD, 500 mg at 02/12/25 2045    nitroglycerin (NITROSTAT) SL tablet 0.4 mg, 0.4 mg, Sublingual, Q5 Min PRN, Arvind Ibarra MD    ondansetron ODT (ZOFRAN-ODT) disintegrating tablet 4 mg, 4 mg, Oral, Q6H PRN **OR** ondansetron (ZOFRAN) injection 4 mg, 4 mg, Intravenous, Q6H PRN, Arvind Ibarra MD    rosuvastatin (CRESTOR) tablet 10 mg, 10 mg, Oral, Nightly, Arvind Ibarra MD, 10 mg at 02/12/25 2046    sodium bicarbonate tablet 650 mg, 650 mg, Oral, Nightly, Arvind Ibarra,  "MD, 650 mg at 02/12/25 2045    sodium chloride 0.9 % flush 10 mL, 10 mL, Intravenous, Q12H, Arvind Ibarra MD, 10 mL at 02/12/25 0823    sodium chloride 0.9 % flush 10 mL, 10 mL, Intravenous, PRN, Arvind Ibarra MD    sodium chloride 0.9 % infusion 40 mL, 40 mL, Intravenous, PRN, Arvind Ibarra MD    sodium zirconium cyclosilicate (LOKELMA) packet 5 g, 5 g, Oral, Every Other Day, Arvind Ibarra MD, 5 g at 02/11/25 0920  Allergies:  Allergies   Allergen Reactions    Ibuprofen Itching and Other (See Comments)     tachycardia     Social History:   Social History     Tobacco Use    Smoking status: Never    Smokeless tobacco: Never   Substance Use Topics    Alcohol use: Not Currently     Comment: socially      Family History:  History reviewed. No pertinent family history.       Review of Systems  See history of present illness and past medical history.  Overall feeling better    Vitals:   /71 (BP Location: Right arm, Patient Position: Lying)   Pulse 84   Temp 97.7 °F (36.5 °C) (Oral)   Resp 24   Ht 170.2 cm (67\")   Wt 92.5 kg (204 lb)   SpO2 92%   BMI 31.95 kg/m²   I/O:   Intake/Output Summary (Last 24 hours) at 2/13/2025 0725  Last data filed at 2/13/2025 0600  Gross per 24 hour   Intake 800 ml   Output 1965 ml   Net -1165 ml     Exam:  Patient is examined using the personal protective equipment as per guidelines from infection control for this particular patient as enacted.  Hand washing was performed before and after patient interaction.  General Appearance:    Alert, cooperative, no distress, appears stated age   Head:    Normocephalic, without obvious abnormality, atraumatic   Eyes:    PERRL, conjunctivae/corneas clear, EOM's intact, both eyes   Ears:    Normal external ear canals, both ears   Nose:   Nares normal, septum midline, mucosa normal, no drainage    or sinus tenderness   Throat:   Lips, tongue, gums normal; oral mucosa pink and moist   Neck: Supple and no adenopathy   Back:   "   Symmetric, no curvature, ROM normal, no CVA tenderness   Lungs:     Clear to auscultation bilaterally, respirations unlabored   Chest Wall:    No tenderness or deformity    Heart:  S1-S2 regular   Abdomen:     Soft, non-tender, bowel sounds active all four quadrants,     no masses, no hepatomegaly, no splenomegaly   Extremities: Left knee wrapped including lower extremity with Ace wrap.  Improving range of motion of the left knee.   Pulses:   Pulses palpable in all extremities; symmetric all extremities   Skin: No rash noted   Neurologic: Alert and oriented times       Data Review:    I reviewed the patient's new clinical results.  Results from last 7 days   Lab Units 02/13/25 0416 02/12/25 0618 02/11/25 0654 02/10/25  0748 02/10/25  0357   WBC 10*3/mm3 17.05* 18.00* 17.81* 16.07* 17.10*   HEMOGLOBIN g/dL 9.9* 9.4* 10.0* 10.3* 10.4*   PLATELETS 10*3/mm3 395 387 408 411 420     Results from last 7 days   Lab Units 02/13/25 0416 02/12/25 0618 02/11/25 0654 02/10/25  0748 02/10/25  0357   SODIUM mmol/L 139 141 141 137 136   POTASSIUM mmol/L 4.3 4.2 4.5 4.5 4.8   CHLORIDE mmol/L 104 108* 109* 105 104   CO2 mmol/L 18.0* 18.0* 16.4* 15.2* 14.5*   BUN mg/dL 37* 31* 36* 53* 56*   CREATININE mg/dL 1.40* 1.25* 1.21* 1.58* 1.73*   CALCIUM mg/dL 9.0 8.8 8.6 8.6 8.8   GLUCOSE mg/dL 202* 80 98 209* 234*     Microbiology Results (last 10 days)       Procedure Component Value - Date/Time    Tissue / Bone Culture - Tissue, Knee, Left [[id number]] Collected: 02/12/25 1341    Lab Status: Preliminary result Specimen: Tissue from Knee, Left Updated: 02/12/25 2125     Gram Stain No WBCs or organisms seen    Body Fluid Culture - Synovial Fluid, Knee, Left [[id number]] Collected: 02/12/25 1327    Lab Status: Preliminary result Specimen: Synovial Fluid from Knee, Left Updated: 02/12/25 1521     Gram Stain Many (4+) WBCs seen      No organisms seen    Body Fluid Culture - Synovial Fluid, Knee, Left [[id number]] Collected: 02/11/25 0804     Lab Status: Preliminary result Specimen: Synovial Fluid from Knee, Left Updated: 02/12/25 0753     Body Fluid Culture No growth     Gram Stain Moderate (3+) WBCs seen      No organisms seen    Urine Culture - Urine, Urine, Clean Catch [[id number]]  (Abnormal)  (Susceptibility) Collected: 02/10/25 0502    Lab Status: Final result Specimen: Urine, Clean Catch Updated: 02/12/25 0903     Urine Culture >100,000 CFU/mL Escherichia coli    Narrative:      Colonization of the urinary tract without infection is common. Treatment is discouraged unless the patient is symptomatic, pregnant, or undergoing an invasive urologic procedure.    Susceptibility        Escherichia coli      TICO      Amoxicillin + Clavulanate Susceptible      Ampicillin Susceptible      Ampicillin + Sulbactam Susceptible      Cefazolin (Urine) Susceptible      Cefepime Susceptible      Ceftazidime Susceptible      Ceftriaxone Susceptible      Gentamicin Susceptible      Levofloxacin Susceptible      Nitrofurantoin Susceptible      Piperacillin + Tazobactam Susceptible      Trimethoprim + Sulfamethoxazole Susceptible                           Blood Culture - Blood, Arm, Left [[id number]]  (Normal) Collected: 02/10/25 0501    Lab Status: Preliminary result Specimen: Blood from Arm, Left Updated: 02/13/25 0515     Blood Culture No growth at 3 days    Blood Culture - Blood, Arm, Left [[id number]]  (Normal) Collected: 02/10/25 0500    Lab Status: Preliminary result Specimen: Blood from Arm, Left Updated: 02/13/25 0515     Blood Culture No growth at 3 days    Respiratory Panel PCR w/COVID-19(SARS-CoV-2) [facility] In-House, NP Swab in UTM/VTM, 2 HR TAT - Swab, Nasopharynx [[id number]]  (Abnormal) Collected: 02/10/25 0401    Lab Status: Final result Specimen: Swab from Nasopharynx Updated: 02/10/25 0824     ADENOVIRUS, PCR Not Detected     Coronavirus 229E Not Detected     Coronavirus HKU1 Not Detected     Coronavirus NL63 Not Detected      Coronavirus OC43 Not Detected     COVID19 Not Detected     Human Metapneumovirus Not Detected     Human Rhinovirus/Enterovirus Not Detected     Influenza A PCR Equivocal     Comment: Appended report. These results have been appended to a previously preliminary verified report.        Influenza B PCR Not Detected     Parainfluenza Virus 1 Not Detected     Parainfluenza Virus 2 Not Detected     Parainfluenza Virus 3 Not Detected     Parainfluenza Virus 4 Not Detected     RSV, PCR Not Detected     Bordetella pertussis pcr Not Detected     Bordetella parapertussis PCR Not Detected     Chlamydophila pneumoniae PCR Not Detected     Mycoplasma pneumo by PCR Not Detected    Narrative:      In the setting of a positive respiratory panel with a viral infection PLUS a negative procalcitonin without other underlying concern for bacterial infection, consider observing off antibiotics or discontinuation of antibiotics and continue supportive care. If the respiratory panel is positive for atypical bacterial infection (Bordetella pertussis, Chlamydophila pneumoniae, or Mycoplasma pneumoniae), consider antibiotic de-escalation to target atypical bacterial infection.              Assessment:  Active Hospital Problems    Diagnosis  POA    **Bacterial pneumonia [J15.9]  Yes    Sepsis, unspecified organism [A41.9]  Yes    Influenza A [J10.1]  Yes    Acute pain of left knee [M25.562]  Yes    Type 2 diabetes mellitus with hyperglycemia, with long-term current use of insulin [E11.65, Z79.4]  Not Applicable    CKD (chronic kidney disease) stage 3, GFR 30-59 ml/min [N18.30]  Yes    Acute respiratory failure with hypoxia [J96.01]  Yes      Resolved Hospital Problems   No resolved problems to display.         Plan:  See above  Roxanne Duncan MD   2/13/2025  07:25 EST    Parts of this note may be an electronic transcription/translation of spoken language to printed text using the Dragon dictation system.     no

## 2025-02-15 ENCOUNTER — EMERGENCY (EMERGENCY)
Facility: HOSPITAL | Age: 55
LOS: 1 days | Discharge: HOME | End: 2025-02-15
Attending: STUDENT IN AN ORGANIZED HEALTH CARE EDUCATION/TRAINING PROGRAM | Admitting: STUDENT IN AN ORGANIZED HEALTH CARE EDUCATION/TRAINING PROGRAM
Payer: COMMERCIAL

## 2025-02-15 VITALS
RESPIRATION RATE: 16 BRPM | SYSTOLIC BLOOD PRESSURE: 134 MMHG | WEIGHT: 154.98 LBS | TEMPERATURE: 98 F | DIASTOLIC BLOOD PRESSURE: 79 MMHG | HEIGHT: 66 IN | HEART RATE: 88 BPM | OXYGEN SATURATION: 96 %

## 2025-02-15 LAB
ALBUMIN SERPL ELPH-MCNC: 4.6 G/DL — SIGNIFICANT CHANGE UP (ref 3.3–5)
ALP SERPL-CCNC: 119 U/L — SIGNIFICANT CHANGE UP (ref 40–120)
ALT FLD-CCNC: 18 U/L — SIGNIFICANT CHANGE UP (ref 4–33)
ANION GAP SERPL CALC-SCNC: 13 MMOL/L — SIGNIFICANT CHANGE UP (ref 7–14)
AST SERPL-CCNC: 18 U/L — SIGNIFICANT CHANGE UP (ref 4–32)
BASOPHILS # BLD AUTO: 0.03 K/UL — SIGNIFICANT CHANGE UP (ref 0–0.2)
BASOPHILS NFR BLD AUTO: 0.4 % — SIGNIFICANT CHANGE UP (ref 0–2)
BILIRUB SERPL-MCNC: 0.6 MG/DL — SIGNIFICANT CHANGE UP (ref 0.2–1.2)
BUN SERPL-MCNC: 13 MG/DL — SIGNIFICANT CHANGE UP (ref 7–23)
CALCIUM SERPL-MCNC: 9.9 MG/DL — SIGNIFICANT CHANGE UP (ref 8.4–10.5)
CHLORIDE SERPL-SCNC: 99 MMOL/L — SIGNIFICANT CHANGE UP (ref 98–107)
CO2 SERPL-SCNC: 27 MMOL/L — SIGNIFICANT CHANGE UP (ref 22–31)
CREAT SERPL-MCNC: 0.72 MG/DL — SIGNIFICANT CHANGE UP (ref 0.5–1.3)
EGFR: 98 ML/MIN/1.73M2 — SIGNIFICANT CHANGE UP
EOSINOPHIL # BLD AUTO: 0.05 K/UL — SIGNIFICANT CHANGE UP (ref 0–0.5)
EOSINOPHIL NFR BLD AUTO: 0.7 % — SIGNIFICANT CHANGE UP (ref 0–6)
GLUCOSE SERPL-MCNC: 131 MG/DL — HIGH (ref 70–99)
HCT VFR BLD CALC: 43.7 % — SIGNIFICANT CHANGE UP (ref 34.5–45)
HGB BLD-MCNC: 14.4 G/DL — SIGNIFICANT CHANGE UP (ref 11.5–15.5)
IANC: 4.47 K/UL — SIGNIFICANT CHANGE UP (ref 1.8–7.4)
IMM GRANULOCYTES NFR BLD AUTO: 0.3 % — SIGNIFICANT CHANGE UP (ref 0–0.9)
LYMPHOCYTES # BLD AUTO: 1.88 K/UL — SIGNIFICANT CHANGE UP (ref 1–3.3)
LYMPHOCYTES # BLD AUTO: 27.4 % — SIGNIFICANT CHANGE UP (ref 13–44)
MCHC RBC-ENTMCNC: 28.6 PG — SIGNIFICANT CHANGE UP (ref 27–34)
MCHC RBC-ENTMCNC: 33 G/DL — SIGNIFICANT CHANGE UP (ref 32–36)
MCV RBC AUTO: 86.7 FL — SIGNIFICANT CHANGE UP (ref 80–100)
MONOCYTES # BLD AUTO: 0.4 K/UL — SIGNIFICANT CHANGE UP (ref 0–0.9)
MONOCYTES NFR BLD AUTO: 5.8 % — SIGNIFICANT CHANGE UP (ref 2–14)
NEUTROPHILS # BLD AUTO: 4.47 K/UL — SIGNIFICANT CHANGE UP (ref 1.8–7.4)
NEUTROPHILS NFR BLD AUTO: 65.4 % — SIGNIFICANT CHANGE UP (ref 43–77)
NRBC # BLD AUTO: 0 K/UL — SIGNIFICANT CHANGE UP (ref 0–0)
NRBC # FLD: 0 K/UL — SIGNIFICANT CHANGE UP (ref 0–0)
NRBC BLD AUTO-RTO: 0 /100 WBCS — SIGNIFICANT CHANGE UP (ref 0–0)
NT-PROBNP SERPL-SCNC: <36 PG/ML — SIGNIFICANT CHANGE UP
PLATELET # BLD AUTO: 280 K/UL — SIGNIFICANT CHANGE UP (ref 150–400)
POTASSIUM SERPL-MCNC: 4 MMOL/L — SIGNIFICANT CHANGE UP (ref 3.5–5.3)
POTASSIUM SERPL-SCNC: 4 MMOL/L — SIGNIFICANT CHANGE UP (ref 3.5–5.3)
PROT SERPL-MCNC: 8.2 G/DL — SIGNIFICANT CHANGE UP (ref 6–8.3)
RBC # BLD: 5.04 M/UL — SIGNIFICANT CHANGE UP (ref 3.8–5.2)
RBC # FLD: 12.2 % — SIGNIFICANT CHANGE UP (ref 10.3–14.5)
SODIUM SERPL-SCNC: 139 MMOL/L — SIGNIFICANT CHANGE UP (ref 135–145)
TROPONIN T, HIGH SENSITIVITY RESULT: <6 NG/L — SIGNIFICANT CHANGE UP
TSH SERPL-MCNC: 0.75 UIU/ML — SIGNIFICANT CHANGE UP (ref 0.27–4.2)
WBC # BLD: 6.85 K/UL — SIGNIFICANT CHANGE UP (ref 3.8–10.5)
WBC # FLD AUTO: 6.85 K/UL — SIGNIFICANT CHANGE UP (ref 3.8–10.5)

## 2025-02-15 PROCEDURE — 99284 EMERGENCY DEPT VISIT MOD MDM: CPT

## 2025-02-15 PROCEDURE — 93010 ELECTROCARDIOGRAM REPORT: CPT

## 2025-02-15 PROCEDURE — 71045 X-RAY EXAM CHEST 1 VIEW: CPT | Mod: 26

## 2025-02-15 RX ORDER — BACTERIOSTATIC SODIUM CHLORIDE 0.9 %
1000 VIAL (ML) INJECTION ONCE
Refills: 0 | Status: COMPLETED | OUTPATIENT
Start: 2025-02-15 | End: 2025-02-15

## 2025-02-15 RX ADMIN — Medication 1000 MILLILITER(S): at 20:44

## 2025-02-15 NOTE — ED ADULT TRIAGE NOTE - CHIEF COMPLAINT QUOTE
c/o palpitations that started today. pt reports took X 1 Valtrex pill today for first HSV out break when symptoms began. Phx of HTN, DM II, high cholesterol. .

## 2025-02-15 NOTE — ED ADULT NURSE NOTE - OBJECTIVE STATEMENT
Pt received in ER for evaluation of palpitations starting 0400-0500am.  Pt  stating she started  Valtrex 0700am today and noticed her heart was racing and she was jittery.  Pt  expressing she has a lot of stress with the new Dx of the [facility] on  2/12/25, impending eviction and work stress.   Pt  received alert and oriented x4 with mood calm. pt observed with color good  with respirations  even and non-labored. Pt denies SOB, chest pain or discomfort.   20 G saline lock placed to right upper extremity, labs drawn and IVF started.

## 2025-02-15 NOTE — ED PROVIDER NOTE - CLINICAL SUMMARY MEDICAL DECISION MAKING FREE TEXT BOX
54-year-old female past medical history of hypertension diabetes is presenting to the emergency department with palpitations.  Says it started earlier today.  She noted that she had a herpes outbreak diagnosed by her gynecologist.  Suspect she got it from her fiancé.  Was ruled out for other STIs by her gynecologist on Monday.  She has been taking Valtrex.  Thinks that Valtrex made her palpitations worse.  No chest pain but describes it as her heart racing.  Could also hear her heart beating fast.  Patient states that she is going through a divorce currently she is contributing to her stress.  Patient is well-appearing, vital signs are reviewed and are within normal limits.  Abdomen soft nontender nondistended, lungs clear to auscultation bilaterally, regular rate and rhythm no murmurs rubs or gallops.  Patient is grossly neurologically intact ambulatory without ataxia.  Will obtain basic screening labs including TSH given empiric crystalloid.  If diagnostic workup negative anticipate discharge. 54-year-old female past medical history of hypertension diabetes is presenting to the emergency department with palpitations.  Says it started earlier today.  She noted that she had a herpes outbreak diagnosed by her gynecologist.  Suspect she got it from her fiancé.  Was ruled out for other STIs by her gynecologist on Monday.  She has been taking Valtrex.  Thinks that Valtrex made her palpitations worse.  No chest pain but describes it as her heart racing.  Could also hear her heart beating fast.  Patient states that she is going through a divorce currently she is contributing to her stress.  Patient is well-appearing, vital signs are reviewed and are within normal limits.  Abdomen soft nontender nondistended, lungs clear to auscultation bilaterally, regular rate and rhythm no murmurs rubs or gallops.  Patient is grossly neurologically intact ambulatory without ataxia.  Will obtain basic screening labs including TSH given empiric crystalloid.  If diagnostic workup negative anticipate discharge.    ===================================  update (Alex Jett MD; attending emergency medicine and medical toxicology)    CBC nonactionable, CMP nonactionable, troponin negative, TSH negative, proBNP negative, chest x-ray is clear.  Patient stable for discharge.  Cardiology follow-up.

## 2025-02-15 NOTE — ED PROVIDER NOTE - PATIENT PORTAL LINK FT
You can access the FollowMyHealth Patient Portal offered by [facility] by registering at the following website: [email or web address]. By joining [employer]s FollowMyHealth portal, you will also be able to view your health information using other applications (apps) compatible with our system.

## (undated) DEVICE — WARMING BLANKET LOWER ADULT

## (undated) DEVICE — DRAPE SPLIT SHEET 77" X 108"

## (undated) DEVICE — PACK LOWER EXTREMITY NS PLAINVI

## (undated) DEVICE — DRAPE 3/4 SHEET W REINFORCEMENT 56X77"

## (undated) DEVICE — SPECIMEN CONTAINER 100ML

## (undated) DEVICE — DRAPE INSTRUMENT POUCH 6.75" X 11"

## (undated) DEVICE — PLV/PSP-TOURNIQUET #8 402007310018: Type: DURABLE MEDICAL EQUIPMENT

## (undated) DEVICE — SUT MONOSOF 4-0 18" P-13

## (undated) DEVICE — SUT POLYSORB 4-0 18" P-13 UNDYED

## (undated) DEVICE — WARMING BLANKET FULL UNDERBODY

## (undated) DEVICE — SOL IRR POUR NS 0.9% 1000ML

## (undated) DEVICE — DRSG MASTISOL

## (undated) DEVICE — ELCTR BOVIE TIP BLADE INSULATED 2.75" EDGE

## (undated) DEVICE — VENODYNE/SCD SLEEVE CALF MEDIUM

## (undated) DEVICE — VENODYNE/SCD SLEEVE CALF LARGE

## (undated) DEVICE — PACK HAND

## (undated) DEVICE — DRSG STOCKINETTE IMPERVIOUS LG

## (undated) DEVICE — STIM SURG NERVE CHECKPOINT

## (undated) DEVICE — DRSG STERISTRIPS 0.5 X 4"

## (undated) DEVICE — DRSG XEROFORM 5 X 9"

## (undated) DEVICE — SOL IRR BAG H2O 1000ML

## (undated) DEVICE — DRSG KLING 4"

## (undated) DEVICE — PREP BETADINE KIT

## (undated) DEVICE — ELCTR BOVIE PENCIL SMOKE EVACUATION

## (undated) DEVICE — ELCTR BOVIE TIP BLADE INSULATED 2.8" EDGE WITH SAFETY